# Patient Record
Sex: MALE | Race: WHITE | NOT HISPANIC OR LATINO | Employment: OTHER | RURAL
[De-identification: names, ages, dates, MRNs, and addresses within clinical notes are randomized per-mention and may not be internally consistent; named-entity substitution may affect disease eponyms.]

---

## 2017-02-24 ENCOUNTER — TELEPHONE (OUTPATIENT)
Dept: FAMILY MEDICINE CLINIC | Facility: CLINIC | Age: 57
End: 2017-02-24

## 2017-02-24 RX ORDER — AZITHROMYCIN 250 MG/1
TABLET, FILM COATED ORAL
Qty: 6 TABLET | Refills: 0 | Status: SHIPPED | OUTPATIENT
Start: 2017-02-24 | End: 2017-03-27

## 2017-02-24 NOTE — TELEPHONE ENCOUNTER
"Vm \"I have a very bad cough, nasal drainage, low grade temp for two weeks, my ears are also hurting. Do I need to be seen or get something called in?\"        2.5.13 onset  2.19.13  ro regular appointment  Z pack    "

## 2017-03-27 ENCOUNTER — HOSPITAL ENCOUNTER (OUTPATIENT)
Facility: HOSPITAL | Age: 57
Setting detail: HOSPITAL OUTPATIENT SURGERY
End: 2017-03-27
Attending: INTERNAL MEDICINE | Admitting: INTERNAL MEDICINE

## 2017-03-27 ENCOUNTER — OFFICE VISIT (OUTPATIENT)
Dept: GASTROENTEROLOGY | Facility: CLINIC | Age: 57
End: 2017-03-27

## 2017-03-27 VITALS
HEART RATE: 74 BPM | BODY MASS INDEX: 30.24 KG/M2 | HEIGHT: 71 IN | WEIGHT: 216 LBS | OXYGEN SATURATION: 99 % | SYSTOLIC BLOOD PRESSURE: 130 MMHG | DIASTOLIC BLOOD PRESSURE: 70 MMHG | TEMPERATURE: 96.4 F

## 2017-03-27 DIAGNOSIS — K63.5 COLON POLYPS: Primary | ICD-10-CM

## 2017-03-27 PROCEDURE — 99203 OFFICE O/P NEW LOW 30 MIN: CPT | Performed by: INTERNAL MEDICINE

## 2017-03-27 RX ORDER — POLYETHYLENE GLYCOL 3350, SODIUM CHLORIDE, SODIUM BICARBONATE, POTASSIUM CHLORIDE 420; 11.2; 5.72; 1.48 G/4L; G/4L; G/4L; G/4L
4000 POWDER, FOR SOLUTION ORAL SEE ADMIN INSTRUCTIONS
Qty: 4000 ML | Refills: 0 | Status: SHIPPED | OUTPATIENT
Start: 2017-03-27 | End: 2017-11-27

## 2017-03-27 NOTE — PROGRESS NOTES
[facility]    Chief Complaint   Patient presents with   • Colonoscopy       Subjective     HPI    Abdullahi Gomez is a 56 y.o. male who presents with a chief complaint of  colon polyps.  He last had a colonoscopy examination about 3 and half years ago.  Adenomatous polyps were noted.  He is doing well now.  Denies any changes bowel habits.  He has had no constipation or diarrhea.  No melena or bright red blood per rectum.    One thing he does have intermittently over the last couple months is intermittent epigastric discomfort.  He describes her attack of epigastric discomfort and he points to his xiphoid process.  It usually last only a couple minutes.  One time he was socially with a little nausea.  It is usually when he is sitting or when he is lying down.  It can be after he ate.  He had one 2 days ago and occurred while he was lying down he had eaten within the hour before.  He denies any dysphagia.  The pain does not radiate to his arm or jaw.  There is no emesis.  No fever chills sweats or diaphoresis.  He does not note any particular foods that brings it on.  He did have a little stressed lately with a grandchild in NICU and his father-in-law passed away.  He lost a little weight.  His diabetes under relatively control with his last hemoglobin A1c 7.5 which was down for him.  Never had any cardiac problems.  Denies a history of heartburn.    Past Medical History:   Diagnosis Date   • Diabetes mellitus    • Hypertension    • Premature ventricular beats        Past Surgical History:   Procedure Laterality Date   • APPENDECTOMY     • COLONOSCOPY  10/22/2013         Current Outpatient Prescriptions:   •  aspirin 81 MG EC tablet, Take 81 mg by mouth Daily., Disp: , Rfl:   •  BYDUREON 2 MG pen-injector, Inject 2 mg under the skin Every 7 (Seven) Days., Disp: , Rfl:   •  colesevelam (WELCHOL) 625 MG tablet, Take 3 tablets by mouth 2 (Two) Times a Day With Meals., Disp: 540 tablet, Rfl: 1  •   glyBURIDE (DIAbeta) 5 MG tablet, Take 5 mg by mouth 2 (Two) Times a Day With Meals., Disp: , Rfl:   •  JARDIANCE 10 MG tablet, TAKE 1 TABLET DAILY, Disp: 90 tablet, Rfl: 1  •  lisinopril (PRINIVIL,ZESTRIL) 10 MG tablet, Take 10 mg by mouth Daily., Disp: , Rfl:   •  metFORMIN (GLUCOPHAGE) 500 MG tablet, Take 1,000 mg by mouth 2 (Two) Times a Day With Meals., Disp: , Rfl:   •  tadalafil (CIALIS) 20 MG tablet, Take 20 mg by mouth Daily As Needed for erectile dysfunction., Disp: , Rfl:   •  polyethylene glycol-electrolytes (NULYTELY WITH FLAVOR PACKS) 420 G solution, Take 4,000 mL by mouth See Admin Instructions., Disp: 4000 mL, Rfl: 0  •  sildenafil (VIAGRA) 100 MG tablet, Take 1 tablet by mouth Daily As Needed for erectile dysfunction., Disp: 2 tablet, Rfl: 0    No Known Allergies    Social History     Social History   • Marital status:      Spouse name: Arlet   • Number of children: 3   • Years of education: 14     Occupational History   • retired       [employer]     Social History Main Topics   • Smoking status: Never Smoker   • Smokeless tobacco: Never Used   • Alcohol use No   • Drug use: No   • Sexual activity: Defer     Other Topics Concern   • Not on file     Social History Narrative       History reviewed. No pertinent family history.    Review of Systems   Constitutional: Negative for activity change, chills and fever.   HENT: Negative for drooling and nosebleeds.    Eyes: Negative for pain and discharge.   Respiratory: Negative for apnea and shortness of breath.    Cardiovascular: Negative for chest pain and palpitations.   Gastrointestinal: Positive for abdominal pain. Negative for abdominal distention, anal bleeding, constipation and diarrhea.        As mentioned in the HPI   Endocrine: Negative for cold intolerance and heat intolerance.   Genitourinary: Negative for hematuria and urgency.   Musculoskeletal: Negative for joint swelling and neck stiffness.   Skin: Negative for  rash and wound.   Allergic/Immunologic: Negative for food allergies and immunocompromised state.   Neurological: Negative for seizures and syncope.   Hematological: Negative for adenopathy. Does not bruise/bleed easily.   Psychiatric/Behavioral: Negative for behavioral problems and confusion.         Objective     Vitals:    03/27/17 1336   BP: 130/70   Pulse: 74   Temp: 96.4 °F (35.8 °C)   SpO2: 99%       Physical Exam   Constitutional: He appears well-developed and well-nourished.   Eyes: Conjunctivae and EOM are normal.   Cardiovascular: Normal rate, regular rhythm and normal heart sounds.    Pulmonary/Chest: Effort normal and breath sounds normal.   Abdominal: Soft. Bowel sounds are normal. He exhibits no distension and no mass. There is no tenderness. There is no rebound and no guarding.   Musculoskeletal: He exhibits no edema.   Skin: Skin is warm.         Assessment/Plan      Abdullahi was seen today for colonoscopy.    Diagnoses and all orders for this visit:    Colon polyps  -     Case request    Other orders  -     polyethylene glycol-electrolytes (NULYTELY WITH FLAVOR PACKS) 420 G solution; Take 4,000 mL by mouth See Admin Instructions.      1.  He does have a history of adenomatous polyps.  It is time for him to undergo a surveillance examination of the colon. The risk of the colonoscopy were discussed in detail.  We discussed the risk of perforation (one out of [id number]), bleeding (one out of 500), and the rare risks of ruptured spleen,  infection, adverse reaction to anesthesia, respiratory failure, cardiac failure including MI and adverse reaction to medications, etc.  We discussed consequences that could occur if a risk were to develop such as the need for hospitalization, blood transfusion, surgical intervention, medications, pain and disability and death.  He wishes to set up colonoscopy.    2.  Epigastric pain  is intermittent attacks of epigastric pain sounds like esophageal spasms.  There is no  indication for cardiac disease but did advise him on signs symptoms of cardiac disease to watch for.  I also suggested he has a prolonged episode to seek medical help immediately.  Otherwise I recommend reflux cautions I went over these in great detail with him. I discussed non pharmaceutical treatment of gerd.  This includes gradually losing weight to achieve ideal body wt., elevation of the head of bed by 4-6 inches, nothing to eat or drink 3 hours prior to lying down, avoiding tight clothing, stress reduction, tobacco cessation, reduction of alcohol intake, and dietary restrictions (avoiding caffeine, coffee, fatty foods, mints, chocolate, spicy foods and tomato based sauces as much as possible).  I also suggest that he get an over-the-counter Pepcid AC or Zantac 75 and take this before breakfast and supper for 1-2 weeks to see if this relieves his discomfort.  If it does then he can use it on a when necessary basis.  If it gets worse or develops any other symptoms or contact us.  We did discuss the option of an endoscopy if he continues to have troubles and he will let me know if such occurs.  Continue ongoing management by primary care provider and other specialists.     EMR Dragon/transcription disclaimer:  Much of this encounter note is electronic transcription/translation of spoken language to printed text.  The electronic translation of spoken language may be erroneous, or at times, nonsensical words or phrases may be inadvertently transcribed.  Although I have reviewed the note for such errors, some may still exist.    Rian Nobles MD  2:03 PM  03/27/17

## 2017-05-26 ENCOUNTER — OFFICE VISIT (OUTPATIENT)
Dept: FAMILY MEDICINE CLINIC | Facility: CLINIC | Age: 57
End: 2017-05-26

## 2017-05-26 VITALS
HEIGHT: 71 IN | OXYGEN SATURATION: 98 % | WEIGHT: 216 LBS | SYSTOLIC BLOOD PRESSURE: 128 MMHG | RESPIRATION RATE: 18 BRPM | DIASTOLIC BLOOD PRESSURE: 74 MMHG | HEART RATE: 88 BPM | BODY MASS INDEX: 30.24 KG/M2 | TEMPERATURE: 98.4 F

## 2017-05-26 DIAGNOSIS — I49.8 OTHER CARDIAC ARRHYTHMIA: ICD-10-CM

## 2017-05-26 DIAGNOSIS — G62.9 NEUROPATHY: Chronic | ICD-10-CM

## 2017-05-26 DIAGNOSIS — E11.9 CONTROLLED TYPE 2 DIABETES MELLITUS WITHOUT COMPLICATION, WITHOUT LONG-TERM CURRENT USE OF INSULIN (HCC): Chronic | ICD-10-CM

## 2017-05-26 DIAGNOSIS — E78.5 HYPERLIPIDEMIA, UNSPECIFIED HYPERLIPIDEMIA TYPE: Chronic | ICD-10-CM

## 2017-05-26 DIAGNOSIS — I10 ESSENTIAL HYPERTENSION: Primary | Chronic | ICD-10-CM

## 2017-05-26 LAB
BUN SERPL-MCNC: 17 MG/DL (ref 5–21)
BUN/CREAT SERPL: 15.2 (ref 7–25)
CALCIUM SERPL-MCNC: 10.5 MG/DL (ref 8.4–10.4)
CHLORIDE SERPL-SCNC: 96 MMOL/L (ref 98–110)
CO2 SERPL-SCNC: 29 MMOL/L (ref 24–31)
CREAT SERPL-MCNC: 1.12 MG/DL (ref 0.5–1.4)
GLUCOSE SERPL-MCNC: 97 MG/DL (ref 70–100)
HBA1C MFR BLD: 9.1 %
POTASSIUM SERPL-SCNC: 5 MMOL/L (ref 3.5–5.3)
SODIUM SERPL-SCNC: 138 MMOL/L (ref 135–145)

## 2017-05-26 PROCEDURE — 99213 OFFICE O/P EST LOW 20 MIN: CPT | Performed by: FAMILY MEDICINE

## 2017-05-30 ENCOUNTER — OUTSIDE FACILITY SERVICE (OUTPATIENT)
Dept: GASTROENTEROLOGY | Facility: CLINIC | Age: 57
End: 2017-05-30

## 2017-05-30 PROCEDURE — 45378 DIAGNOSTIC COLONOSCOPY: CPT | Performed by: INTERNAL MEDICINE

## 2017-06-22 DIAGNOSIS — E11.9 CONTROLLED TYPE 2 DIABETES MELLITUS WITHOUT COMPLICATION, WITHOUT LONG-TERM CURRENT USE OF INSULIN (HCC): Primary | Chronic | ICD-10-CM

## 2017-08-24 ENCOUNTER — RESULTS ENCOUNTER (OUTPATIENT)
Dept: FAMILY MEDICINE CLINIC | Facility: CLINIC | Age: 57
End: 2017-08-24

## 2017-08-24 DIAGNOSIS — E11.9 CONTROLLED TYPE 2 DIABETES MELLITUS WITHOUT COMPLICATION, WITHOUT LONG-TERM CURRENT USE OF INSULIN (HCC): Chronic | ICD-10-CM

## 2017-08-25 PROBLEM — Z01.89 LABORATORY TEST: Status: ACTIVE | Noted: 2017-08-25

## 2017-08-25 LAB — HBA1C MFR BLD: 7.6 %

## 2017-10-20 DIAGNOSIS — E11.9 CONTROLLED TYPE 2 DIABETES MELLITUS WITHOUT COMPLICATION, WITHOUT LONG-TERM CURRENT USE OF INSULIN (HCC): Primary | Chronic | ICD-10-CM

## 2017-10-20 RX ORDER — EMPAGLIFLOZIN 10 MG/1
TABLET, FILM COATED ORAL
Qty: 90 TABLET | Refills: 2 | Status: SHIPPED | OUTPATIENT
Start: 2017-10-20 | End: 2018-05-25 | Stop reason: CLARIF

## 2017-11-27 ENCOUNTER — OFFICE VISIT (OUTPATIENT)
Dept: FAMILY MEDICINE CLINIC | Facility: CLINIC | Age: 57
End: 2017-11-27

## 2017-11-27 VITALS
RESPIRATION RATE: 18 BRPM | DIASTOLIC BLOOD PRESSURE: 70 MMHG | OXYGEN SATURATION: 98 % | SYSTOLIC BLOOD PRESSURE: 120 MMHG | HEIGHT: 71 IN | HEART RATE: 98 BPM | BODY MASS INDEX: 30.1 KG/M2 | TEMPERATURE: 98.3 F | WEIGHT: 215 LBS

## 2017-11-27 DIAGNOSIS — E78.5 HYPERLIPIDEMIA, UNSPECIFIED HYPERLIPIDEMIA TYPE: Chronic | ICD-10-CM

## 2017-11-27 DIAGNOSIS — N52.9 ERECTILE DYSFUNCTION, UNSPECIFIED ERECTILE DYSFUNCTION TYPE: Chronic | ICD-10-CM

## 2017-11-27 DIAGNOSIS — E11.9 CONTROLLED TYPE 2 DIABETES MELLITUS WITHOUT COMPLICATION, WITHOUT LONG-TERM CURRENT USE OF INSULIN (HCC): Chronic | ICD-10-CM

## 2017-11-27 DIAGNOSIS — G62.9 NEUROPATHY: Chronic | ICD-10-CM

## 2017-11-27 DIAGNOSIS — F95.9 TIC: ICD-10-CM

## 2017-11-27 DIAGNOSIS — J32.9 SINUSITIS, UNSPECIFIED CHRONICITY, UNSPECIFIED LOCATION: ICD-10-CM

## 2017-11-27 DIAGNOSIS — I10 ESSENTIAL HYPERTENSION: Primary | Chronic | ICD-10-CM

## 2017-11-27 PROCEDURE — 99213 OFFICE O/P EST LOW 20 MIN: CPT | Performed by: FAMILY MEDICINE

## 2017-11-27 RX ORDER — FLUTICASONE PROPIONATE 50 MCG
2 SPRAY, SUSPENSION (ML) NASAL DAILY
Qty: 1 BOTTLE | Refills: 0
Start: 2017-11-27 | End: 2018-07-13

## 2017-11-27 NOTE — PROGRESS NOTES
Subjective   Abdullahi Gomez is a 57 y.o. male presenting with chief complaint of:   Chief Complaint   Patient presents with   • Hypertension   • Hyperlipidemia   • Diabetes   • Erectile Dysfunction       History of Present Illness :  unaccompanied.  Here for primarily a/an Chronic issue today.   Has has  multiple chronic problems to consider, is here for an interval appointment.  One chronic problem is HTN.     The HTN has been present for years/it is chronic.  The HTN is assumed essential/without testing needed to look for other.  The HTN is controlled manifest by todays blood pressure and same home monitoring.  Associated illness below.   Other chronic problem/s to consider:   DM2: This has been present for years/over a year.  It is chronic.  It is generally controlled.  Home accuchecks run fasting 100, random 140.   No hypoglycemia manifest as syncope/near syncope or diaphoretic/sweating episodes.  A1c below if available.  Diet loose with less sweets. .   Hyperlipidemia: This has been present for years/over a year.  It is chronic.   It is generally controlled.   .  Labs are needed periodic to monitor condition/safetly.   Rx therapy Welchol.  Erectile dysfunction/sexual dysfunction:  This has been present for   This is chronic.  This has not been tested.  It is manifest as weaker/at times erections making intercoarse difficult/incomplete.    Neuropathy:  Has had for years/chronic.  On/off LE numbness.  No obvious worse.      Has an/another acute issue today: sinus congestion 2w; without fever/dental issues.    The following portions of the patient's history were reviewed and updated as appropriate: allergies, current medications, past family history, past medical history, past social history, past surgical history and problem list.  Records acquired and reviewed; TCC migrated.      Current Outpatient Prescriptions:   •  aspirin 81 MG EC tablet, Take 81 mg by mouth Daily., Disp: , Rfl:   •  colesevelam (WELCHOL)  625 MG tablet, Take 3 tablets by mouth 2 (Two) Times a Day With Meals., Disp: 540 tablet, Rfl: 1  •  Exenatide ER (BYDUREON) 2 MG pen-injector, Inject 2 mg under the skin Every 7 (Seven) Days., Disp: 12 each, Rfl: 3  •  glyBURIDE (DIAbeta) 5 MG tablet, Take 5 mg by mouth 2 (Two) Times a Day With Meals., Disp: , Rfl:   •  JARDIANCE 10 MG tablet, TAKE 1 TABLET DAILY, Disp: 90 tablet, Rfl: 2  •  lisinopril (PRINIVIL,ZESTRIL) 10 MG tablet, Take 10 mg by mouth Daily., Disp: , Rfl:   •  metFORMIN (GLUCOPHAGE) 500 MG tablet, Take 1,000 mg by mouth 2 (Two) Times a Day With Meals., Disp: , Rfl:   •  tadalafil (CIALIS) 20 MG tablet, Take 20 mg by mouth Daily As Needed for erectile dysfunction., Disp: , Rfl:     No Known Allergies    Review of Systems  GENERAL:  Active/slower with limits, speed, stamina for age. Sleep is ok. No fever now.  ENDO:  No syncope, near or diaphoretic sweaty spells.  BS Ok: no download noted.  HEENT: No head injury or headache,  No vision change, No significant hearing loss.  Ears without pain/drainage.  No sore throat.  No usual significant nasal/sinus congestion/drainage; worse couple weeks.  No epistaxis.  CHEST: No chest wall tenderness or mass. No significant cough, without wheeze.  No SOB; no hemoptysis.  CV: No chest pain, palpitations, ankle edema.  GI: No heartburn, dysphagia.  No abdominal pain, diarrhea, constipation.  No rectal bleeding, or melena.    Colon-nl/[facility]/6.12.17/10y    :  Voids without dysuria, or incontinence to completion.  ORTHO: No painful/swollen joints but various on /off sore.  No  sore neck or back.  No acute neck or back pain without recent injury.   NEURO: No dizziness, weakness of extremities.  No change occ numbness/paresthesias.   Two weeks tics of L cheek.   PSYCH: No memory loss.  Mood good; not anxious, depressed but/and not suicidal.  Tries to tolerate stress .     Results for orders placed or performed in visit on 08/24/17   Hemoglobin A1c   Result  "Value Ref Range    Hemoglobin A1C 7.60 %       Lab Results   Component Value Date    HGBA1C 7.60 08/25/2017    HGBA1C 9.10 05/26/2017       Lab Results   Component Value Date     05/26/2017     12/02/2016    K 5.0 05/26/2017    K 4.7 12/02/2016    CL 96 (L) 05/26/2017     12/02/2016    CO2 29.0 05/26/2017    CO2 26.0 12/02/2016    BUN 17 05/26/2017    BUN 14 12/02/2016    CREATININE 1.12 05/26/2017    CREATININE 1.16 12/02/2016    CALCIUM 10.5 (H) 05/26/2017    CALCIUM 9.8 12/02/2016       Lab Results   Component Value Date    PSA 0.404 12/02/2016        Lab Results:  CBC:    Lab Results - Last 18 Months  Lab Units 12/02/16  0821   WBC 10*3/mm3 6.75   HEMATOCRIT % 47.0     CMP:    Lab Results - Last 18 Months  Lab Units 05/26/17  0954 12/02/16  0821   SODIUM mmol/L 138 140   CHLORIDE mmol/L 96* 102   TOTAL CO2, ARTERIAL mmol/L 29.0 26.0   BUN mg/dL 17 14   CREATININE mg/dL 1.12 1.16   EGFR IF NONAFRICN AM mL/min/1.73 68 66   EGFR IF AFRICN AM mL/min/1.73 83 80   CALCIUM mg/dL 10.5* 9.8     THYROID:No results for input(s): TSH, T3FREE, FREET4 in the last 01166 hours.    Invalid input(s): T3, T4  A1C:    Lab Results - Last 18 Months  Lab Units 08/25/17  0739 05/26/17  0954   HEMOGLOBIN A1C % 7.60 9.10       Objective   /70  Pulse 98  Temp 98.3 °F (36.8 °C) (Oral)   Resp 18  Ht 71\" (180.3 cm)  Wt 215 lb (97.5 kg)  SpO2 98%  BMI 29.99 kg/m2    Physical Exam  GENERAL:  Well nourished/developed in no acute distress. Obese   SKIN: Turgor excellent, without wound, rash, lesion.  HEENT: Normal cephalic without trauma.  Pupils equal round reactive to light. Extraocular motions full without nystagmus.   External canals nonobstructive nontender without reddness. Tymphatic membranes vale with ottoniel structures intact.   Oral cavity without growths, exudates, and moist.  Posterior pharynx without mass, obstruction, redness.  No thyromegaly, mass, tenderness, lymphadenopathy and supple.  CV: Regular " rhythm.  No murmur, gallop,  edema. Posterior pulses intact.  No carotid bruits.  CHEST: No chest wall tenderness or mass.   LUNGS: Symmetric motion with clear to auscultation.   ABD: Soft, nontender without mass.   ORTHO: Symmetric extremities without swelling/point tenderness.  Full gross range of motion.  NEURO: CN 2-12 grossly intact.  Symmetric facies. 1/4 x bicep knee equal reflexes.  UE/LE   3-4/5 strength throughout.  Nonfocal use extremities. Speech clear.  Reduced light touch with monofilament, vibratory sensation with tuning fork; equal toes/distal feet.    PSYCH: Oriented x 3.  Pleasant calm, well kept.  Purposeful/directed conservation with intact short/long gross memory.     Assessment/Plan     1. Essential hypertension    2. Hyperlipidemia, unspecified hyperlipidemia type    3. Controlled type 2 diabetes mellitus without complication, without long-term current use of insulin    4. Neuropathy    5. Erectile dysfunction, unspecified erectile dysfunction type    6. Sinusitis, unspecified chronicity, unspecified location    7. Tic        Rx: reviewed/changes:  flonase OTC for a few days first    LAB: reviewed/orders:   6m BMP, A1c  12m CBC, CMP, A1c, LIPID, TSH, PSAs    Discussions:   Wrap up    Patient Instructions   Regular cardio exercise something everyone should consider and try to do; even if health limitations (ie find that exercise UE/LE/cardio that they can tolerate). (as we discussed)  Normal weight a goal for everyone (as we discussed)  Healthy diet helpful for weight management, illness prevention (as we discussed)  If over 50-screening exams include men PSA/rectal exam, women mammograms, and  everyone colonoscopy screening for colon cancer.          Follow up: Return for lab couple months; , lab during/or just before next apt;, Dr Gandhi-, 6 m;.  No future appointments.

## 2017-11-27 NOTE — PATIENT INSTRUCTIONS
Regular cardio exercise something everyone should consider and try to do; even if health limitations (ie find that exercise UE/LE/cardio that they can tolerate). (as we discussed)  Normal weight a goal for everyone (as we discussed)  Healthy diet helpful for weight management, illness prevention (as we discussed)  If over 50-screening exams include men PSA/rectal exam, women mammograms, and  everyone colonoscopy screening for colon cancer.

## 2018-01-23 DIAGNOSIS — N52.9 ERECTILE DYSFUNCTION, UNSPECIFIED ERECTILE DYSFUNCTION TYPE: Chronic | ICD-10-CM

## 2018-01-23 DIAGNOSIS — Z00.00 WELLNESS EXAMINATION: ICD-10-CM

## 2018-01-23 DIAGNOSIS — I10 ESSENTIAL HYPERTENSION: Primary | Chronic | ICD-10-CM

## 2018-01-23 DIAGNOSIS — E78.5 HYPERLIPIDEMIA, UNSPECIFIED HYPERLIPIDEMIA TYPE: Chronic | ICD-10-CM

## 2018-01-23 DIAGNOSIS — E66.9 CLASS 1 OBESITY WITH BODY MASS INDEX (BMI) OF 30.0 TO 30.9 IN ADULT, UNSPECIFIED OBESITY TYPE, UNSPECIFIED WHETHER SERIOUS COMORBIDITY PRESENT: Chronic | ICD-10-CM

## 2018-01-23 DIAGNOSIS — E11.9 CONTROLLED TYPE 2 DIABETES MELLITUS WITHOUT COMPLICATION, WITHOUT LONG-TERM CURRENT USE OF INSULIN (HCC): Chronic | ICD-10-CM

## 2018-01-23 DIAGNOSIS — G62.9 NEUROPATHY: Chronic | ICD-10-CM

## 2018-01-24 ENCOUNTER — RESULTS ENCOUNTER (OUTPATIENT)
Dept: FAMILY MEDICINE CLINIC | Facility: CLINIC | Age: 58
End: 2018-01-24

## 2018-01-24 DIAGNOSIS — E11.9 CONTROLLED TYPE 2 DIABETES MELLITUS WITHOUT COMPLICATION, WITHOUT LONG-TERM CURRENT USE OF INSULIN (HCC): Chronic | ICD-10-CM

## 2018-01-24 DIAGNOSIS — E78.5 HYPERLIPIDEMIA, UNSPECIFIED HYPERLIPIDEMIA TYPE: Chronic | ICD-10-CM

## 2018-01-24 DIAGNOSIS — N52.9 ERECTILE DYSFUNCTION, UNSPECIFIED ERECTILE DYSFUNCTION TYPE: Chronic | ICD-10-CM

## 2018-01-24 DIAGNOSIS — E66.9 CLASS 1 OBESITY WITH BODY MASS INDEX (BMI) OF 30.0 TO 30.9 IN ADULT, UNSPECIFIED OBESITY TYPE, UNSPECIFIED WHETHER SERIOUS COMORBIDITY PRESENT: Chronic | ICD-10-CM

## 2018-01-24 DIAGNOSIS — Z00.00 WELLNESS EXAMINATION: ICD-10-CM

## 2018-01-24 DIAGNOSIS — G62.9 NEUROPATHY: Chronic | ICD-10-CM

## 2018-01-24 DIAGNOSIS — I10 ESSENTIAL HYPERTENSION: Chronic | ICD-10-CM

## 2018-01-30 DIAGNOSIS — E11.9 CONTROLLED TYPE 2 DIABETES MELLITUS WITHOUT COMPLICATION, WITHOUT LONG-TERM CURRENT USE OF INSULIN (HCC): Primary | Chronic | ICD-10-CM

## 2018-01-30 DIAGNOSIS — I10 ESSENTIAL HYPERTENSION: Chronic | ICD-10-CM

## 2018-01-30 RX ORDER — LISINOPRIL 10 MG/1
10 TABLET ORAL DAILY
Qty: 90 TABLET | Refills: 0 | Status: SHIPPED | OUTPATIENT
Start: 2018-01-30 | End: 2018-09-19 | Stop reason: SDUPTHER

## 2018-02-28 ENCOUNTER — TELEPHONE (OUTPATIENT)
Dept: FAMILY MEDICINE CLINIC | Facility: CLINIC | Age: 58
End: 2018-02-28

## 2018-02-28 ENCOUNTER — OFFICE VISIT (OUTPATIENT)
Dept: FAMILY MEDICINE CLINIC | Facility: CLINIC | Age: 58
End: 2018-02-28

## 2018-02-28 VITALS
HEART RATE: 118 BPM | SYSTOLIC BLOOD PRESSURE: 128 MMHG | HEIGHT: 71 IN | BODY MASS INDEX: 29.68 KG/M2 | WEIGHT: 212 LBS | OXYGEN SATURATION: 97 % | TEMPERATURE: 99.9 F | RESPIRATION RATE: 16 BRPM | DIASTOLIC BLOOD PRESSURE: 72 MMHG

## 2018-02-28 DIAGNOSIS — L03.314 CELLULITIS OF GROIN: Primary | ICD-10-CM

## 2018-02-28 PROCEDURE — 10061 I&D ABSCESS COMP/MULTIPLE: CPT | Performed by: FAMILY MEDICINE

## 2018-02-28 PROCEDURE — 99213 OFFICE O/P EST LOW 20 MIN: CPT | Performed by: FAMILY MEDICINE

## 2018-02-28 RX ORDER — LEVOFLOXACIN 750 MG/1
750 TABLET ORAL DAILY
Qty: 7 TABLET | Refills: 0 | Status: SHIPPED | OUTPATIENT
Start: 2018-02-28 | End: 2018-05-25

## 2018-02-28 RX ORDER — HYDROCODONE BITARTRATE AND ACETAMINOPHEN 5; 325 MG/1; MG/1
1 TABLET ORAL EVERY 6 HOURS PRN
Qty: 12 TABLET | Refills: 0 | Status: SHIPPED | OUTPATIENT
Start: 2018-02-28 | End: 2018-07-13

## 2018-03-05 LAB
BACTERIA SPEC AEROBE CULT: ABNORMAL
BACTERIA SPEC ANAEROBE CULT: ABNORMAL
BACTERIA SPEC CULT: ABNORMAL
BACTERIA SPEC CULT: ABNORMAL
Lab: NORMAL

## 2018-03-08 ENCOUNTER — TELEPHONE (OUTPATIENT)
Dept: FAMILY MEDICINE CLINIC | Facility: CLINIC | Age: 58
End: 2018-03-08

## 2018-03-08 RX ORDER — METRONIDAZOLE 250 MG/1
250 TABLET ORAL 3 TIMES DAILY
Qty: 21 TABLET | Refills: 0 | Status: SHIPPED | OUTPATIENT
Start: 2018-03-08 | End: 2018-05-25

## 2018-03-08 RX ORDER — DOXYCYCLINE HYCLATE 100 MG/1
TABLET, DELAYED RELEASE ORAL
Qty: 21 TABLET | Refills: 0 | Status: SHIPPED | OUTPATIENT
Start: 2018-03-08 | End: 2018-03-08 | Stop reason: SDUPTHER

## 2018-03-08 RX ORDER — METRONIDAZOLE 250 MG/1
250 TABLET ORAL 3 TIMES DAILY
Qty: 21 TABLET | Refills: 0 | Status: SHIPPED | OUTPATIENT
Start: 2018-03-08 | End: 2018-03-08 | Stop reason: SDUPTHER

## 2018-03-08 RX ORDER — DOXYCYCLINE HYCLATE 100 MG/1
TABLET, DELAYED RELEASE ORAL
Qty: 21 TABLET | Refills: 0 | Status: SHIPPED | OUTPATIENT
Start: 2018-03-08 | End: 2018-05-25

## 2018-05-25 ENCOUNTER — TELEPHONE (OUTPATIENT)
Dept: FAMILY MEDICINE CLINIC | Facility: CLINIC | Age: 58
End: 2018-05-25

## 2018-05-25 NOTE — TELEPHONE ENCOUNTER
Current Outpatient Prescriptions:   •  aspirin 81 MG EC tablet, Take 81 mg by mouth Daily., Disp: , Rfl:   •  colesevelam (WELCHOL) 625 MG tablet, Take 3 tablets by mouth 2 (Two) Times a Day With Meals., Disp: 540 tablet, Rfl: 1  •  Exenatide ER (BYDUREON) 2 MG pen-injector, Inject 2 mg under the skin Every 7 (Seven) Days., Disp: 12 each, Rfl: 3  •  fluticasone (FLONASE) 50 MCG/ACT nasal spray, 2 sprays into each nostril Daily., Disp: 1 bottle, Rfl: 0  •  glyBURIDE (DIAbeta) 5 MG tablet, Take 5 mg by mouth 2 (Two) Times a Day With Meals., Disp: , Rfl:   •  HYDROcodone-acetaminophen (NORCO) 5-325 MG per tablet, Take 1 tablet by mouth Every 6 (Six) Hours As Needed for Moderate Pain ., Disp: 12 tablet, Rfl: 0  •  JARDIANCE 10 MG tablet, TAKE 1 TABLET DAILY, Disp: 90 tablet, Rfl: 2  •  lisinopril (PRINIVIL,ZESTRIL) 10 MG tablet, Take 1 tablet by mouth Daily., Disp: 90 tablet, Rfl: 0  •  metFORMIN (GLUCOPHAGE) 500 MG tablet, Take 2 tablets by mouth 2 (Two) Times a Day With Meals., Disp: 360 tablet, Rfl: 0  •  tadalafil (CIALIS) 20 MG tablet, Take 20 mg by mouth Daily As Needed for erectile dysfunction., Disp: , Rfl:     Invokana 100 mg

## 2018-05-25 NOTE — TELEPHONE ENCOUNTER
Pt called and states insurance will not pay for Jardiance any longer but they will cover Invokana and pt would like a new Rx sent [phone]

## 2018-06-21 RX ORDER — COLESEVELAM 180 1/1
1875 TABLET ORAL 2 TIMES DAILY WITH MEALS
Qty: 540 TABLET | Refills: 1 | Status: SHIPPED | OUTPATIENT
Start: 2018-06-21 | End: 2019-02-27 | Stop reason: SDUPTHER

## 2018-07-09 DIAGNOSIS — E78.5 HYPERLIPIDEMIA, UNSPECIFIED HYPERLIPIDEMIA TYPE: Chronic | ICD-10-CM

## 2018-07-09 DIAGNOSIS — G62.9 NEUROPATHY: Chronic | ICD-10-CM

## 2018-07-09 DIAGNOSIS — E11.9 CONTROLLED TYPE 2 DIABETES MELLITUS WITHOUT COMPLICATION, WITHOUT LONG-TERM CURRENT USE OF INSULIN (HCC): Chronic | ICD-10-CM

## 2018-07-09 DIAGNOSIS — Z00.00 WELLNESS EXAMINATION: Primary | ICD-10-CM

## 2018-07-09 DIAGNOSIS — I10 ESSENTIAL HYPERTENSION: Chronic | ICD-10-CM

## 2018-07-09 DIAGNOSIS — N52.9 ERECTILE DYSFUNCTION, UNSPECIFIED ERECTILE DYSFUNCTION TYPE: Chronic | ICD-10-CM

## 2018-07-10 ENCOUNTER — RESULTS ENCOUNTER (OUTPATIENT)
Dept: FAMILY MEDICINE CLINIC | Facility: CLINIC | Age: 58
End: 2018-07-10

## 2018-07-10 DIAGNOSIS — Z00.00 WELLNESS EXAMINATION: ICD-10-CM

## 2018-07-10 DIAGNOSIS — N52.9 ERECTILE DYSFUNCTION, UNSPECIFIED ERECTILE DYSFUNCTION TYPE: Chronic | ICD-10-CM

## 2018-07-10 DIAGNOSIS — E78.5 HYPERLIPIDEMIA, UNSPECIFIED HYPERLIPIDEMIA TYPE: Chronic | ICD-10-CM

## 2018-07-10 DIAGNOSIS — E11.9 CONTROLLED TYPE 2 DIABETES MELLITUS WITHOUT COMPLICATION, WITHOUT LONG-TERM CURRENT USE OF INSULIN (HCC): Chronic | ICD-10-CM

## 2018-07-10 DIAGNOSIS — I10 ESSENTIAL HYPERTENSION: Chronic | ICD-10-CM

## 2018-07-10 DIAGNOSIS — G62.9 NEUROPATHY: Chronic | ICD-10-CM

## 2018-07-12 PROBLEM — E83.52 HYPERCALCEMIA: Status: ACTIVE | Noted: 2018-07-12

## 2018-07-12 LAB
ALBUMIN SERPL-MCNC: 4.3 G/DL (ref 3.5–5)
ALBUMIN/GLOB SERPL: 1.4 G/DL (ref 1.1–2.5)
ALP SERPL-CCNC: 85 U/L (ref 24–120)
ALT SERPL-CCNC: 37 U/L (ref 0–54)
AST SERPL-CCNC: 34 U/L (ref 7–45)
BASOPHILS # BLD AUTO: 0.02 10*3/MM3 (ref 0–0.2)
BASOPHILS NFR BLD AUTO: 0.3 % (ref 0–2)
BILIRUB SERPL-MCNC: 0.7 MG/DL (ref 0.1–1)
BUN SERPL-MCNC: 11 MG/DL (ref 5–21)
BUN/CREAT SERPL: 11 (ref 7–25)
CALCIUM SERPL-MCNC: 10.9 MG/DL (ref 8.4–10.4)
CHLORIDE SERPL-SCNC: 97 MMOL/L (ref 98–110)
CHOLEST SERPL-MCNC: 141 MG/DL (ref 130–200)
CO2 SERPL-SCNC: 28 MMOL/L (ref 24–31)
CREAT SERPL-MCNC: 1 MG/DL (ref 0.5–1.4)
EOSINOPHIL # BLD AUTO: 0.17 10*3/MM3 (ref 0–0.7)
EOSINOPHIL NFR BLD AUTO: 2.5 % (ref 0–4)
ERYTHROCYTE [DISTWIDTH] IN BLOOD BY AUTOMATED COUNT: 13.2 % (ref 12–15)
GLOBULIN SER CALC-MCNC: 3 GM/DL
GLUCOSE SERPL-MCNC: 102 MG/DL (ref 70–100)
HBA1C MFR BLD: 9.7 %
HCT VFR BLD AUTO: 49.2 % (ref 40–52)
HCV AB S/CO SERPL IA: 0.2 S/CO RATIO (ref 0–0.9)
HDLC SERPL-MCNC: 31 MG/DL
HGB BLD-MCNC: 16.5 G/DL (ref 14–18)
IMM GRANULOCYTES # BLD: 0.03 10*3/MM3 (ref 0–0.03)
IMM GRANULOCYTES NFR BLD: 0.4 % (ref 0–5)
LDLC SERPL CALC-MCNC: 88 MG/DL (ref 0–99)
LYMPHOCYTES # BLD AUTO: 1.72 10*3/MM3 (ref 0.72–4.86)
LYMPHOCYTES NFR BLD AUTO: 25.4 % (ref 15–45)
MCH RBC QN AUTO: 30.3 PG (ref 28–32)
MCHC RBC AUTO-ENTMCNC: 33.5 G/DL (ref 33–36)
MCV RBC AUTO: 90.3 FL (ref 82–95)
MICROALBUMIN UR-MCNC: 7.7 UG/ML
MONOCYTES # BLD AUTO: 0.58 10*3/MM3 (ref 0.19–1.3)
MONOCYTES NFR BLD AUTO: 8.6 % (ref 4–12)
NEUTROPHILS # BLD AUTO: 4.25 10*3/MM3 (ref 1.87–8.4)
NEUTROPHILS NFR BLD AUTO: 62.8 % (ref 39–78)
NRBC BLD AUTO-RTO: 0 /100 WBC (ref 0–0)
PLATELET # BLD AUTO: 183 10*3/MM3 (ref 130–400)
POTASSIUM SERPL-SCNC: 4.4 MMOL/L (ref 3.5–5.3)
PROT SERPL-MCNC: 7.3 G/DL (ref 6.3–8.7)
PSA SERPL-MCNC: 0.52 NG/ML (ref 0–4)
RBC # BLD AUTO: 5.45 10*6/MM3 (ref 4.8–5.9)
SODIUM SERPL-SCNC: 138 MMOL/L (ref 135–145)
TRIGL SERPL-MCNC: 108 MG/DL (ref 0–149)
TSH SERPL DL<=0.005 MIU/L-ACNC: 1.68 MIU/ML (ref 0.47–4.68)
VLDLC SERPL CALC-MCNC: 21.6 MG/DL
WBC # BLD AUTO: 6.77 10*3/MM3 (ref 4.8–10.8)

## 2018-07-13 ENCOUNTER — OFFICE VISIT (OUTPATIENT)
Dept: FAMILY MEDICINE CLINIC | Facility: CLINIC | Age: 58
End: 2018-07-13

## 2018-07-13 VITALS
OXYGEN SATURATION: 98 % | SYSTOLIC BLOOD PRESSURE: 100 MMHG | HEIGHT: 71 IN | TEMPERATURE: 98.4 F | HEART RATE: 48 BPM | BODY MASS INDEX: 29.96 KG/M2 | WEIGHT: 214 LBS | DIASTOLIC BLOOD PRESSURE: 66 MMHG | RESPIRATION RATE: 18 BRPM

## 2018-07-13 DIAGNOSIS — E78.5 HYPERLIPIDEMIA, UNSPECIFIED HYPERLIPIDEMIA TYPE: Chronic | ICD-10-CM

## 2018-07-13 DIAGNOSIS — IMO0001 UNCONTROLLED TYPE 2 DIABETES MELLITUS WITHOUT COMPLICATION, WITHOUT LONG-TERM CURRENT USE OF INSULIN: ICD-10-CM

## 2018-07-13 DIAGNOSIS — I49.8 OTHER CARDIAC ARRHYTHMIA: ICD-10-CM

## 2018-07-13 DIAGNOSIS — I10 HYPERTENSION, UNSPECIFIED TYPE: Primary | Chronic | ICD-10-CM

## 2018-07-13 DIAGNOSIS — G62.9 NEUROPATHY: Chronic | ICD-10-CM

## 2018-07-13 DIAGNOSIS — E83.52 HYPERCALCEMIA: ICD-10-CM

## 2018-07-13 DIAGNOSIS — R00.1 BRADYCARDIA: ICD-10-CM

## 2018-07-13 PROBLEM — IMO0002 DIABETES TYPE 2, UNCONTROLLED: Status: ACTIVE | Noted: 2018-07-13

## 2018-07-13 PROCEDURE — 99214 OFFICE O/P EST MOD 30 MIN: CPT | Performed by: FAMILY MEDICINE

## 2018-07-13 NOTE — PROGRESS NOTES
Subjective   Abdullahi Gomez is a 57 y.o. male presenting with chief complaint of:   Chief Complaint   Patient presents with   • Hypertension     Patient is here today for a 6 month follow-up.   • Hyperlipidemia   • Diabetes       History of Present Illness :  Alone.   Has multiple chronic problems to consider that might have a bearing on today's issues;  an interval appointment.       Chronic/acute problems to review today:   1. Hypertension, unspecified type: chronic/stable with visit bp here and rare home   2. Controlled type 2 diabetes mellitus without complication, without long-term current use of insulin (CMS/[facility]): chronic/worse with recent A1c higher.  Not tight on diet or exercise.  No hypoglycemia.  Last eye exam some early retinopathy R.  LE numbness before.    3. Hyperlipidemia, unspecified hyperlipidemia type: chronic/welchol treated/improved   4. Neuropathy: chronic/slowly worse but tolerated.    5. Other cardiac arrhythmia: new/ashkan today without symptoms   6. Hypercalcemia-#: chronic/variable without dx yet.      Has an/another acute issue today: none.    The following portions of the patient's history were reviewed and updated as appropriate: allergies, current medications, past family history, past medical history, past social history, past surgical history and problem list.  Records acquired and reviewed; TCC migrated.      Current Outpatient Prescriptions:   •  aspirin 81 MG EC tablet, Take 81 mg by mouth Daily., Disp: , Rfl:   •  colesevelam (WELCHOL) 625 MG tablet, Take 3 tablets by mouth 2 (Two) Times a Day With Meals., Disp: 540 tablet, Rfl: 1  •  Exenatide ER (BYDUREON) 2 MG pen-injector, Inject 2 mg under the skin Every 7 (Seven) Days., Disp: 12 each, Rfl: 3  •  glyBURIDE (DIAbeta) 5 MG tablet, Take 5 mg by mouth 2 (Two) Times a Day With Meals., Disp: , Rfl:   •  lisinopril (PRINIVIL,ZESTRIL) 10 MG tablet, Take 1 tablet by mouth Daily., Disp: 90 tablet, Rfl: 0  •  metFORMIN (GLUCOPHAGE) 500  MG tablet, Take 2 tablets by mouth 2 (Two) Times a Day With Meals., Disp: 360 tablet, Rfl: 0  •  tadalafil (CIALIS) 20 MG tablet, Take 20 mg by mouth Daily As Needed for erectile dysfunction., Disp: , Rfl:   •  Canagliflozin (INVOKANA) 300 MG tablet, Take 300 mg by mouth Daily., Disp: 30 tablet, Rfl: 1    No problems with medications.  Refills if needed done    No Known Allergies    Review of Systems  GENERAL:  Active/slower with limits, speed, stamina for age. Sleep is ok. No fever now.  ENDO:  No syncope, near or diaphoretic sweaty spells.  BS Ok: no download noted.  HEENT: No head injury or headache,  No vision change, No significant hearing loss.  Ears without pain/drainage.  No sore throat.  No usual significant nasal/sinus congestion/drainage; worse couple weeks.  No epistaxis.  CHEST: No chest wall tenderness or mass. No significant cough, without wheeze.  No SOB; no hemoptysis.  CV: No chest pain, palpitations, ankle edema.  GI: No heartburn, dysphagia.  No abdominal pain, diarrhea, constipation.  No rectal bleeding, or melena.    :  Voids without dysuria, or incontinence to completion. AUA 3/1-tolerated  ORTHO: No painful/swollen joints but various on /off sore.  No  sore neck or back.  No acute neck or back pain without recent injury.   NEURO: No dizziness, weakness of extremities.  No change occ numbness/paresthesias.   Two weeks tics of L cheek.   PSYCH: No memory loss.  Mood good; not anxious, depressed     Screening:  Mammogram: NA  Bone density: NA  Low dose CT chest: NA  GI: Colon-nl/[facility]/6.12.17/10y  Prostate due    Results for orders placed or performed in visit on 07/10/18   MicroAlbumin, Urine, Random - Urine, Clean Catch   Result Value Ref Range    Microalbumin, Urine 7.7 Not Estab. ug/mL   Hepatitis C antibody   Result Value Ref Range    Hep C Virus Ab 0.2 0.0 - 0.9 s/co ratio   Hemoglobin A1c   Result Value Ref Range    Hemoglobin A1C 9.70 %   Comprehensive metabolic panel   Result  Value Ref Range    Glucose 102 (H) 70 - 100 mg/dL    BUN 11 5 - 21 mg/dL    Creatinine 1.00 0.50 - 1.40 mg/dL    eGFR Non African Am 78 >60 mL/min/1.73    eGFR African Am 94 >60 mL/min/1.73    BUN/Creatinine Ratio 11.0 7.0 - 25.0    Sodium 138 135 - 145 mmol/L    Potassium 4.4 3.5 - 5.3 mmol/L    Chloride 97 (L) 98 - 110 mmol/L    Total CO2 28.0 24.0 - 31.0 mmol/L    Calcium 10.9 (H) 8.4 - 10.4 mg/dL    Total Protein 7.3 6.3 - 8.7 g/dL    Albumin 4.30 3.50 - 5.00 g/dL    Globulin 3.0 gm/dL    A/G Ratio 1.4 1.1 - 2.5 g/dL    Total Bilirubin 0.7 0.1 - 1.0 mg/dL    Alkaline Phosphatase 85 24 - 120 U/L    AST (SGOT) 34 7 - 45 U/L    ALT (SGPT) 37 0 - 54 U/L   Lipid panel   Result Value Ref Range    Total Cholesterol 141 130 - 200 mg/dL    Triglycerides 108 0 - 149 mg/dL    HDL Cholesterol 31 (L) >=40 mg/dL    VLDL Cholesterol 21.6 mg/dL    LDL Cholesterol  88 0 - 99 mg/dL   TSH   Result Value Ref Range    TSH 1.680 0.470 - 4.680 mIU/mL   PSA   Result Value Ref Range    PSA 0.518 0.000 - 4.000 ng/mL   CBC and Differential   Result Value Ref Range    WBC 6.77 4.80 - 10.80 10*3/mm3    RBC 5.45 4.80 - 5.90 10*6/mm3    Hemoglobin 16.5 14.0 - 18.0 g/dL    Hematocrit 49.2 40.0 - 52.0 %    MCV 90.3 82.0 - 95.0 fL    MCH 30.3 28.0 - 32.0 pg    MCHC 33.5 33.0 - 36.0 g/dL    RDW 13.2 12.0 - 15.0 %    Platelets 183 130 - 400 10*3/mm3    Neutrophil Rel % 62.8 39.0 - 78.0 %    Lymphocyte Rel % 25.4 15.0 - 45.0 %    Monocyte Rel % 8.6 4.0 - 12.0 %    Eosinophil Rel % 2.5 0.0 - 4.0 %    Basophil Rel % 0.3 0.0 - 2.0 %    Neutrophils Absolute 4.25 1.87 - 8.40 10*3/mm3    Lymphocytes Absolute 1.72 0.72 - 4.86 10*3/mm3    Monocytes Absolute 0.58 0.19 - 1.30 10*3/mm3    Eosinophils Absolute 0.17 0.00 - 0.70 10*3/mm3    Basophils Absolute 0.02 0.00 - 0.20 10*3/mm3    Immature Granulocyte Rel % 0.4 0.0 - 5.0 %    Immature Grans Absolute 0.03 0.00 - 0.03 10*3/mm3    nRBC 0.0 0.0 - 0.0 /100 WBC       Lab Results   Component Value Date    PSA  "0.518 07/11/2018    PSA 0.404 12/02/2016        Lab Results:  CBC:  Lab Results - Last 18 Months  Lab Units 07/11/18  0832   HEMOGLOBIN g/dL 16.5   HEMATOCRIT % 49.2   PLATELETS 10*3/mm3 183      BMP/CMP:  Lab Results - Last 18 Months  Lab Units 07/11/18  0832 05/26/17  0954   SODIUM mmol/L 138 138   POTASSIUM mmol/L 4.4 5.0   CHLORIDE mmol/L 97* 96*   TOTAL CO2, ARTERIAL mmol/L 28.0 29.0   GLUCOSE mg/dL 102* 97   BUN mg/dL 11 17   CREATININE mg/dL 1.00 1.12   EGFR IF NONAFRICN AM mL/min/1.73 78 68   EGFR IF AFRICN AM mL/min/1.73 94 83   CALCIUM mg/dL 10.9* 10.5*     HEPATIC:  Lab Results - Last 18 Months  Lab Units 07/11/18  0832   ALT (SGPT) U/L 37   AST (SGOT) U/L 34   ALK PHOS U/L 85     THYROID:  Lab Results - Last 18 Months  Lab Units 07/11/18  0832   TSH mIU/mL 1.680     A1C:  Lab Results - Last 18 Months  Lab Units 07/11/18  0832 08/25/17  0739 05/26/17  0954   HEMOGLOBIN A1C % 9.70 7.60 9.10     PSA:  Lab Results - Last 18 Months  Lab Units 07/11/18  0832   PSA ng/mL 0.518       Objective   /66 (BP Location: Left arm, Patient Position: Sitting)   Pulse (!) 48   Temp 98.4 °F (36.9 °C) (Oral)   Resp 18   Ht 180.3 cm (71\")   Wt 97.1 kg (214 lb)   SpO2 98%   BMI 29.85 kg/m²   Body mass index is 29.85 kg/m².    Physical Exam  GENERAL:  Well nourished/developed in no acute distress. Obese   SKIN: Turgor excellent, without wound, rash, lesion.  HEENT: Normal cephalic without trauma.  Pupils equal round reactive to light. Extraocular motions full without nystagmus.   External canals nonobstructive nontender without reddness. Tymphatic membranes vale with ottoniel structures intact.   Oral cavity without growths, exudates, and moist.  Posterior pharynx without mass, obstruction, redness.  No thyromegaly, mass, tenderness, lymphadenopathy and supple.  CV: Regular rhythm.  No murmur, gallop,  edema. Posterior pulses intact.  No carotid bruits.  CHEST: No chest wall tenderness or mass.   LUNGS: Symmetric " motion with clear to auscultation.   ABD: Soft, nontender without mass.   PROSTATE: No visible/palpable lesion/tenderness and anal tone intact/full.  Prostate 20 gm/smooth and nontender.  No rectal mass within reach. Stool on glove brown.   ORTHO: Symmetric extremities without swelling/point tenderness.  Full gross range of motion.  NEURO: CN 2-12 grossly intact.  Symmetric facies. 1/4 x bicep knee equal reflexes.  UE/LE   3-4/5 strength throughout.  Nonfocal use extremities. Speech clear.  Reduced light touch with monofilament, vibratory sensation with tuning fork; equal toes/distal feet.    PSYCH: Oriented x 3.  Pleasant calm, well kept.  Purposeful/directed conservation with intact short/long gross memory.       Assessment/Plan     1. Hypertension, unspecified type    2. Hyperlipidemia, unspecified hyperlipidemia type    3. Neuropathy    4. Other cardiac arrhythmia    5. Hypercalcemia-#    6. Bradycardia    7. Uncontrolled type 2 diabetes mellitus without complication, without long-term current use of insulin (CMS/[facility])        Rx: reviewed/changes:  Increase invokana 100 to 300    LAB/Testing/Referrals: reviewed/orders:   Today: procalcitonin, quanitative proteins/electrophoresis  Orders Placed This Encounter   Procedures   • PTH, Intact   • Protein Electrophoresis, Total     Usual:   Extra BMP, A1c around 10.13.18  6m CMP, A1c  12m CBC, CMP, A1c, LIPID, TSH, PSAs, B12, folate    Discussions:     Body mass index is 29.85 kg/m².   Patient's Body mass index is 29.85 kg/m². BMI is above normal parameters. Recommendations include: exercise counseling and nutrition counseling.  Non-smoker    There are no Patient Instructions on file for this visit.    Follow up: Return for extra lab 3m ;  , lab during/or just before next apt;, Dr Gandhi-.  Future Appointments  Date Time Provider Department Center   10/17/2018 8:30 AM LAB [facility] None   1/2/2019 8:45 AM LAB [facility] [place] [facility] None   1/9/2019  10:30 AM Davi Gandhi MD [facility] None

## 2018-07-16 LAB
ALBUMIN SERPL ELPH-MCNC: 4 G/DL (ref 2.9–4.4)
ALBUMIN/GLOB SERPL: 1.1 {RATIO} (ref 0.7–1.7)
ALPHA1 GLOB SERPL ELPH-MCNC: 0.2 G/DL (ref 0–0.4)
ALPHA2 GLOB SERPL ELPH-MCNC: 0.7 G/DL (ref 0.4–1)
B-GLOBULIN SERPL ELPH-MCNC: 1.1 G/DL (ref 0.7–1.3)
GAMMA GLOB SERPL ELPH-MCNC: 1.5 G/DL (ref 0.4–1.8)
GLOBULIN SER CALC-MCNC: 3.6 G/DL (ref 2.2–3.9)
LABORATORY COMMENT REPORT: NORMAL
M PROTEIN SERPL ELPH-MCNC: NORMAL G/DL
PROT SERPL-MCNC: 7.6 G/DL (ref 6–8.5)
PTH-INTACT SERPL-MCNC: 16 PG/ML (ref 15–65)

## 2018-09-19 DIAGNOSIS — E11.9 CONTROLLED TYPE 2 DIABETES MELLITUS WITHOUT COMPLICATION, WITHOUT LONG-TERM CURRENT USE OF INSULIN (HCC): Chronic | ICD-10-CM

## 2018-09-19 DIAGNOSIS — I10 ESSENTIAL HYPERTENSION: Chronic | ICD-10-CM

## 2018-09-19 RX ORDER — LISINOPRIL 10 MG/1
TABLET ORAL
Qty: 90 TABLET | Refills: 2 | Status: SHIPPED | OUTPATIENT
Start: 2018-09-19 | End: 2019-08-22 | Stop reason: SDUPTHER

## 2018-10-27 DIAGNOSIS — IMO0001 UNCONTROLLED TYPE 2 DIABETES MELLITUS WITHOUT COMPLICATION, WITHOUT LONG-TERM CURRENT USE OF INSULIN: ICD-10-CM

## 2018-10-30 DIAGNOSIS — E11.9 CONTROLLED TYPE 2 DIABETES MELLITUS WITHOUT COMPLICATION, WITHOUT LONG-TERM CURRENT USE OF INSULIN (HCC): Primary | Chronic | ICD-10-CM

## 2018-10-30 DIAGNOSIS — I10 HYPERTENSION, UNSPECIFIED TYPE: Chronic | ICD-10-CM

## 2018-10-31 ENCOUNTER — RESULTS ENCOUNTER (OUTPATIENT)
Dept: FAMILY MEDICINE CLINIC | Facility: CLINIC | Age: 58
End: 2018-10-31

## 2018-10-31 DIAGNOSIS — I10 HYPERTENSION, UNSPECIFIED TYPE: Chronic | ICD-10-CM

## 2018-10-31 DIAGNOSIS — E11.9 CONTROLLED TYPE 2 DIABETES MELLITUS WITHOUT COMPLICATION, WITHOUT LONG-TERM CURRENT USE OF INSULIN (HCC): Chronic | ICD-10-CM

## 2018-10-31 LAB
BUN SERPL-MCNC: 12 MG/DL (ref 5–21)
BUN/CREAT SERPL: 10.6 (ref 7–25)
CALCIUM SERPL-MCNC: 11.3 MG/DL (ref 8.4–10.4)
CHLORIDE SERPL-SCNC: 97 MMOL/L (ref 98–110)
CO2 SERPL-SCNC: 31 MMOL/L (ref 24–31)
CREAT SERPL-MCNC: 1.13 MG/DL (ref 0.5–1.4)
GLUCOSE SERPL-MCNC: 108 MG/DL (ref 70–100)
HBA1C MFR BLD: 8.7 %
POTASSIUM SERPL-SCNC: 5.1 MMOL/L (ref 3.5–5.3)
SODIUM SERPL-SCNC: 140 MMOL/L (ref 135–145)

## 2018-11-01 NOTE — PROGRESS NOTES
Pt informed lab is better, not perfect 8.7. Calcium is up Would like him to see ENT to see if they think he has a Parathyroid problem. Can move his appt here to discuss if he would rather.Wants to move appt up. Has been done

## 2018-11-13 ENCOUNTER — OFFICE VISIT (OUTPATIENT)
Dept: FAMILY MEDICINE CLINIC | Facility: CLINIC | Age: 58
End: 2018-11-13

## 2018-11-13 VITALS
HEART RATE: 84 BPM | BODY MASS INDEX: 29.96 KG/M2 | TEMPERATURE: 97.9 F | RESPIRATION RATE: 18 BRPM | WEIGHT: 214 LBS | DIASTOLIC BLOOD PRESSURE: 74 MMHG | HEIGHT: 71 IN | SYSTOLIC BLOOD PRESSURE: 122 MMHG | OXYGEN SATURATION: 99 %

## 2018-11-13 DIAGNOSIS — I49.8 OTHER CARDIAC ARRHYTHMIA: ICD-10-CM

## 2018-11-13 DIAGNOSIS — I10 HYPERTENSION, UNSPECIFIED TYPE: Chronic | ICD-10-CM

## 2018-11-13 DIAGNOSIS — J32.9 SINUSITIS, UNSPECIFIED CHRONICITY, UNSPECIFIED LOCATION: ICD-10-CM

## 2018-11-13 DIAGNOSIS — J06.9 ACUTE URI: ICD-10-CM

## 2018-11-13 DIAGNOSIS — E78.5 HYPERLIPIDEMIA, UNSPECIFIED HYPERLIPIDEMIA TYPE: Chronic | ICD-10-CM

## 2018-11-13 DIAGNOSIS — E83.52 HYPERCALCEMIA: ICD-10-CM

## 2018-11-13 DIAGNOSIS — E11.65 UNCONTROLLED TYPE 2 DIABETES MELLITUS WITH HYPERGLYCEMIA (HCC): ICD-10-CM

## 2018-11-13 PROCEDURE — 99214 OFFICE O/P EST MOD 30 MIN: CPT | Performed by: FAMILY MEDICINE

## 2018-11-13 RX ORDER — GLYBURIDE 5 MG/1
5 TABLET ORAL 2 TIMES DAILY WITH MEALS
Qty: 180 TABLET | Refills: 1 | Status: SHIPPED | OUTPATIENT
Start: 2018-11-13 | End: 2019-10-09 | Stop reason: SDUPTHER

## 2018-11-13 RX ORDER — AZITHROMYCIN 250 MG/1
TABLET, FILM COATED ORAL
Qty: 6 TABLET | Refills: 0 | Status: SHIPPED | OUTPATIENT
Start: 2018-11-13 | End: 2018-12-04

## 2018-11-14 LAB
25(OH)D3+25(OH)D2 SERPL-MCNC: 15.4 NG/ML (ref 30–100)
CA-I SERPL ISE-MCNC: 5 MG/DL (ref 4.5–5.6)
CALCIUM SERPL-MCNC: 9.5 MG/DL (ref 8.4–10.4)
PTH-INTACT SERPL-MCNC: 65 PG/ML (ref 15–65)

## 2018-11-15 DIAGNOSIS — R79.9 ABNORMAL BLOOD CHEMISTRY: Primary | ICD-10-CM

## 2018-12-04 ENCOUNTER — CLINICAL SUPPORT (OUTPATIENT)
Dept: OTOLARYNGOLOGY | Facility: CLINIC | Age: 58
End: 2018-12-04

## 2018-12-04 ENCOUNTER — OFFICE VISIT (OUTPATIENT)
Dept: OTOLARYNGOLOGY | Facility: CLINIC | Age: 58
End: 2018-12-04

## 2018-12-04 VITALS
WEIGHT: 210.5 LBS | TEMPERATURE: 97.3 F | DIASTOLIC BLOOD PRESSURE: 81 MMHG | BODY MASS INDEX: 29.47 KG/M2 | SYSTOLIC BLOOD PRESSURE: 116 MMHG | HEIGHT: 71 IN | HEART RATE: 86 BPM

## 2018-12-04 DIAGNOSIS — E83.52 HYPERCALCEMIA: ICD-10-CM

## 2018-12-04 DIAGNOSIS — E04.1 THYROID NODULE: Primary | ICD-10-CM

## 2018-12-04 DIAGNOSIS — E04.1 THYROID NODULE: ICD-10-CM

## 2018-12-04 PROCEDURE — 99203 OFFICE O/P NEW LOW 30 MIN: CPT | Performed by: NURSE PRACTITIONER

## 2018-12-04 PROCEDURE — 76536 US EXAM OF HEAD AND NECK: CPT | Performed by: NURSE PRACTITIONER

## 2018-12-04 NOTE — PROGRESS NOTES
YOB: 1960  Location: [facility]  Location Address: [address] [place] [address], [facility], [address] [place] [address]  Location Phone: [phone]    Chief Complaint   Patient presents with   • Thyroid Problem       History of Present Illness  Abdullahi Gomez is a 58 y.o. male.  Abdullahi Gomez is here for evaluation of ENT complaints. The patient has had problems with ?thyroid nodules, elevated calcium levels  The symptoms are not localized to a particular location. The patient has had improving symptoms. The symptoms have been present for the last several months The symptoms are aggravated by  no identifiable factors. The symptoms are improved by no identifiable factors.  He denies any overt symptoms.    Calcium   Order: [id number]   Status:  Final result   Visible to patient:  No (Not Released) Dx:  Hypercalcemia-watching    Ref Range & Units 3wk ago   Calcium 8.4 - 10.4 mg/dL 9.5    Resulting Agency  [facility]             PTH, Intact   Order: [id number]   Status:  Final result   Visible to patient:  No (Not Released) Dx:  Hypercalcemia-watching    Ref Range & Units 3wk ago   PTH, Intact 15 - 65 pg/mL 65    Resulting Agency  [facility]      Narrative           TSH   Order: [id number]   Status:  Final result   Visible to patient:  No (Not Released) Dx:  Neuropathy; Essential hypertension; W...    Ref Range & Units 4mo ago   TSH 0.470 - 4.680 mIU/mL 1.680    Resulting Agency  [facility]      Narrative   Performed                  Past Medical History:   Diagnosis Date   • Diabetes mellitus (CMS/HCC)    • Hypertension    • Premature ventricular beats        Past Surgical History:   Procedure Laterality Date   • APPENDECTOMY     • COLONOSCOPY  10/22/2013       Outpatient Medications Marked as Taking for the 18 encounter (Office Visit) with Pastora Hummel APRN   Medication Sig Dispense Refill   • aspirin 81 MG EC tablet Take 81 mg by mouth Daily.     • Canagliflozin (INVOKANA) 300 MG tablet Take 300 mg by mouth Daily. 90  tablet 1   • colesevelam (WELCHOL) 625 MG tablet Take 3 tablets by mouth 2 (Two) Times a Day With Meals. 540 tablet 1   • Exenatide ER (BYDUREON) 2 MG pen-injector Inject 2 mg under the skin Every 7 (Seven) Days. 12 each 3   • glyBURIDE (DIAbeta) 5 MG tablet Take 1 tablet by mouth 2 (Two) Times a Day With Meals. 180 tablet 1   • lisinopril (PRINIVIL,ZESTRIL) 10 MG tablet TAKE 1 TABLET DAILY 90 tablet 2   • metFORMIN (GLUCOPHAGE) 500 MG tablet TAKE 2 TABLETS 2 TIMES     DAILY WITH MEALS 360 tablet 2   • tadalafil (CIALIS) 20 MG tablet Take 20 mg by mouth Daily As Needed for erectile dysfunction.         Patient has no known allergies.    Family History   Problem Relation Age of Onset   • Diabetes Mother    • Stroke Mother    • Heart disease Father        Social History     Socioeconomic History   • Marital status:      Spouse name: Arlet   • Number of children: 3   • Years of education: 14   • Highest education level: Not on file   Social Needs   • Financial resource strain: Not on file   • Food insecurity - worry: Not on file   • Food insecurity - inability: Not on file   • Transportation needs - medical: Not on file   • Transportation needs - non-medical: Not on file   Occupational History   • Occupation: retired     Comment:  state of [place]   Tobacco Use   • Smoking status: Never Smoker   • Smokeless tobacco: Never Used   Substance and Sexual Activity   • Alcohol use: No   • Drug use: No   • Sexual activity: Defer   Other Topics Concern   • Not on file   Social History Narrative   • Not on file       Review of Systems   Constitutional: Negative.    HENT:        SEE HPI   Eyes: Negative.    Respiratory: Negative.    Cardiovascular: Negative.    Gastrointestinal: Negative.    Endocrine: Negative.    Genitourinary: Negative.    Musculoskeletal: Negative.    Skin: Negative.    Allergic/Immunologic: Negative.    Neurological: Negative.    Hematological: Negative.    Psychiatric/Behavioral:  Negative.        Vitals:    12/04/18 1441   BP: 116/81   Pulse: 86   Temp: 97.3 °F (36.3 °C)       Body mass index is 29.36 kg/m².    Objective     Physical Exam  CONSTITUTIONAL: well nourished, alert, oriented, in no acute distress     COMMUNICATION AND VOICE: able to communicate normally, normal voice quality    HEAD: normocephalic, no lesions, atraumatic, no tenderness, no masses     FACE: appearance normal, no lesions, no tenderness, no deformities, facial motion symmetric    SALIVARY GLANDS: parotid glands with no tenderness, no swelling, no masses, submandibular glands with normal size, nontender    EYES: ocular motility normal, eyelids normal, orbits normal, no proptosis, conjunctiva normal , pupils equal, round     EARS:  Hearing: response to conversational voice normal bilaterally   External Ears: auricles without lesions  Otoscopic: tympanic membrane appearance normal, no lesions, no perforation, normal mobility, no fluid    NOSE:  External Nose: structure normal, no tenderness on palpation, no nasal discharge, no lesions, no evidence of trauma, nostrils patent   Intranasal Exam: nasal mucosa normal, vestibule within normal limits, inferior turbinate normal, nasal septum midline     ORAL:  Lips: upper and lower lips without lesion   Teeth: dentition within normal limits for age   Gums: gingivae healthy   Oral Mucosa: oral mucosa normal, no mucosal lesions   Floor of Mouth: Warthin’s duct patent, mucosa normal  Tongue: lingual mucosa normal without lesions, normal tongue mobility   Palate: soft and hard palates with normal mucosa and structure  Oropharynx: oropharyngeal mucosa normal    NECK: neck appearance normal, no mass,  noted without erythema or tenderness    THYROID: no overt thyromegaly, no tenderness, nodules or mass present on palpation, position midline     LYMPH NODES: no lymphadenopathy    CHEST/RESPIRATORY: respiratory effort normal,     CARDIOVASCULAR:extremities without cyanosis or edema       NEUROLOGIC/PSYCHIATRIC: oriented to time, place and person, mood normal, affect appropriate, CN II-XII intact grossly    Assessment/Plan   Abdullahi was seen today for thyroid problem.    Diagnoses and all orders for this visit:    Thyroid nodule r/o  -     US Head Neck Soft Tissue    Hypercalcemia resolved      * Surgery not found *  Orders Placed This Encounter   Procedures   • US Head Neck Soft Tissue     Order Specific Question:   Reason for Exam:     Answer:   globus sensation, r/o thyroid nodules, difficulty swallowing     Return if symptoms worsen or fail to improve.       Patient Instructions   No signs of thyroid or parathyroid abnormalities at this time    Recommend PCP to followup serial calcium levels and intervene with endocrinology as needed

## 2018-12-05 NOTE — PATIENT INSTRUCTIONS
No signs of thyroid or parathyroid abnormalities at this time    Recommend PCP to followup serial calcium levels and intervene with endocrinology as needed

## 2018-12-17 ENCOUNTER — OFFICE VISIT (OUTPATIENT)
Dept: FAMILY MEDICINE CLINIC | Facility: CLINIC | Age: 58
End: 2018-12-17

## 2018-12-17 VITALS
OXYGEN SATURATION: 98 % | DIASTOLIC BLOOD PRESSURE: 70 MMHG | TEMPERATURE: 97.5 F | HEIGHT: 71 IN | HEART RATE: 100 BPM | WEIGHT: 206 LBS | RESPIRATION RATE: 18 BRPM | SYSTOLIC BLOOD PRESSURE: 92 MMHG | BODY MASS INDEX: 28.84 KG/M2

## 2018-12-17 DIAGNOSIS — E78.5 HYPERLIPIDEMIA, UNSPECIFIED HYPERLIPIDEMIA TYPE: Chronic | ICD-10-CM

## 2018-12-17 DIAGNOSIS — E11.65 UNCONTROLLED TYPE 2 DIABETES MELLITUS WITH HYPERGLYCEMIA (HCC): ICD-10-CM

## 2018-12-17 DIAGNOSIS — I10 HYPERTENSION, UNSPECIFIED TYPE: Primary | Chronic | ICD-10-CM

## 2018-12-17 DIAGNOSIS — E83.52 HYPERCALCEMIA: ICD-10-CM

## 2018-12-17 PROCEDURE — 99213 OFFICE O/P EST LOW 20 MIN: CPT | Performed by: FAMILY MEDICINE

## 2018-12-17 NOTE — PROGRESS NOTES
Subjective   Abdullahi Gomez is a 58 y.o. male presenting with chief complaint of:   Chief Complaint   Patient presents with   • Thyroid Problem     Follow-up from labs.       History of Present Illness :  Alone.  Here for primarily an acute issue today; past Ca elevation that has since gone down.  PTH has however stayed up (without renal explaination).  Imaging by BH/ENT found nothing yet.   Has multiple chronic problems to consider that might have a bearing on today's issues; somewhat an interval appointment.       Chronic/acute problems reviewed today:   1. Hypertension, unspecified type: Chronic/stable. Stable here/if home blood pressures.  No significant chest pain, SOB, LE edema, orthopnea, near syncope, dizziness/light headness.      2. Hypercalcemia-watching: chronic/as above.  Denies muscle spasms.    3. Hyperlipidemia, unspecified hyperlipidemia type: Chronic/stable.  Tolerated use of welchol with labs showing improved lipid values and tolerant liver labs. No muscle aches unexpected.      4. Uncontrolled type 2 diabetes mellitus with hyperglycemia (CMS/[facility]): chronic/hoping next A1c better. Chronic/stable. No problem/pattern hypoglycemia/hyperglycemia manifest by poly- dypsia, phagia, uria, or sweats, diaphoretic episodes, syncope/near.        Has an/another acute issue today: none.    The following portions of the patient's history were reviewed and updated as appropriate: allergies, current medications, past family history, past medical history, past social history, past surgical history and problem list.      Current Outpatient Medications:   •  aspirin 81 MG EC tablet, Take 81 mg by mouth Daily., Disp: , Rfl:   •  Canagliflozin (INVOKANA) 300 MG tablet, Take 300 mg by mouth Daily., Disp: 90 tablet, Rfl: 1  •  colesevelam (WELCHOL) 625 MG tablet, Take 3 tablets by mouth 2 (Two) Times a Day With Meals., Disp: 540 tablet, Rfl: 1  •  Exenatide ER (BYDUREON) 2 MG pen-injector, Inject 2 mg under the skin  Every 7 (Seven) Days., Disp: 12 each, Rfl: 3  •  glyBURIDE (DIAbeta) 5 MG tablet, Take 1 tablet by mouth 2 (Two) Times a Day With Meals., Disp: 180 tablet, Rfl: 1  •  lisinopril (PRINIVIL,ZESTRIL) 10 MG tablet, TAKE 1 TABLET DAILY, Disp: 90 tablet, Rfl: 2  •  metFORMIN (GLUCOPHAGE) 500 MG tablet, TAKE 2 TABLETS 2 TIMES     DAILY WITH MEALS, Disp: 360 tablet, Rfl: 2  •  tadalafil (CIALIS) 20 MG tablet, Take 20 mg by mouth Daily As Needed for erectile dysfunction., Disp: , Rfl:     No problems with medications.  Refills if needed done    No Known Allergies    Review of Systems  GENERAL:  Active/slower with limits, speed, stamina for age. Sleep is ok. No fever now.  ENDO:  No syncope, near or diaphoretic sweaty spells.  BS better;  no download noted.  HEENT: No head injury or headache,  No vision change, No significant hearing loss.  Ears without pain/drainage.  No sore throat.  No usual significant nasal/sinus congestion/drainage; worse couple weeks.  No epistaxis.  CHEST: No chest wall tenderness or mass. No usual significant cough, without wheeze.  No SOB; no hemoptysis.  CV: No chest pain, palpitations, ankle edema.  GI: No heartburn, dysphagia.  No abdominal pain, diarrhea, constipation.  No rectal bleeding, or melena.    :  Voids without dysuria, or incontinence to completion.   ORTHO: No painful/swollen joints but various on /off sore.  No  sore neck or back.  No acute neck or back pain without recent injury.   NEURO: No dizziness, weakness of extremities.  No change occ numbness/paresthesias.   Two weeks tics of L cheek.   PSYCH: No memory loss.  Mood good; not anxious, depressed   Screening:  Mammogram: NA  Bone density: NA  Low dose CT chest: NA  GI: Colon-nl/[facility]/6.12.17/10y  Prostate: 7.13.18  Usual lab order  6m CMP, A1c  12m CBC, CMP, A1c, LIPID, TSH, PSAs, B12, folate    Results for orders placed or performed in visit on 11/13/18   Calcium, Ionized   Result Value Ref Range    Ionized Calcium 5.0  "4.5 - 5.6 mg/dL   PTH, Intact   Result Value Ref Range    PTH, Intact 65 15 - 65 pg/mL   Vitamin D 25 Hydroxy   Result Value Ref Range    25 Hydroxy, Vitamin D 15.4 (L) 30.0 - 100.0 ng/ml   Calcium   Result Value Ref Range    Calcium 9.5 8.4 - 10.4 mg/dL       Lab Results   Component Value Date    PSA 0.518 07/11/2018    PSA 0.404 12/02/2016        Lab Results:  CBC:  Lab Results - Last 18 Months   Lab Units  07/11/18   0832   HEMOGLOBIN g/dL  16.5   HEMATOCRIT %  49.2   PLATELETS 10*3/mm3  183      BMP/CMP:  Lab Results - Last 18 Months   Lab Units  11/13/18   1209  10/31/18   0712  07/11/18   0832   SODIUM mmol/L   --   140  138   POTASSIUM mmol/L   --   5.1  4.4   CHLORIDE mmol/L   --   97*  97*   TOTAL CO2 mmol/L   --   31.0  28.0   GLUCOSE mg/dL   --   108*  102*   BUN mg/dL   --   12  11   CREATININE mg/dL   --   1.13  1.00   EGFR IF NONAFRICN AM mL/min/1.73   --   67  78   EGFR IF AFRICN AM mL/min/1.73   --   81  94   CALCIUM mg/dL  9.5  11.3*  10.9*     HEPATIC:  Lab Results - Last 18 Months   Lab Units  07/11/18   0832   ALT (SGPT) U/L  37   AST (SGOT) U/L  34   ALK PHOS U/L  85     THYROID:  Lab Results - Last 18 Months   Lab Units  07/11/18   0832   TSH mIU/mL  1.680     A1C:  Lab Results - Last 18 Months   Lab Units  10/31/18   0712  07/11/18   0832  08/25/17   0739   HEMOGLOBIN A1C %  8.70  9.70  7.60     PSA:  Lab Results - Last 18 Months   Lab Units  07/11/18   0832   PSA ng/mL  0.518       Objective   BP 92/70 (BP Location: Left arm, Patient Position: Sitting, Cuff Size: Adult)   Pulse 100   Temp 97.5 °F (36.4 °C) (Oral)   Resp 18   Ht 180.3 cm (71\")   Wt 93.4 kg (206 lb)   SpO2 98%   BMI 28.73 kg/m²   Body mass index is 28.73 kg/m².    Physical Exam  GENERAL:  Well nourished/developed in no acute distress. Obese   SKIN: Turgor excellent, without wound, rash, lesion.  HEENT: Normal cephalic without trauma.  Pupils equal round reactive to light. Extraocular motions full without nystagmus. "   External canals nonobstructive nontender without reddness. Tymphatic membranes vale with ottoniel structures intact.   Oral cavity without growths, exudates, and moist.  Posterior pharynx without mass, obstruction, redness.  No thyromegaly, mass, tenderness, lymphadenopathy and supple.  CV: Regular rhythm.  No murmur, gallop,  edema. Posterior pulses intact.  No carotid bruits.  CHEST: No chest wall tenderness or mass.   LUNGS: Symmetric motion with clear to auscultation.   ABD: Soft, nontender without mass.   PROSTATE: No visible/palpable lesion/tenderness and anal tone intact/full.  Prostate 20 gm/smooth and nontender.  No rectal mass within reach. Stool on glove brown.   ORTHO: Symmetric extremities without swelling/point tenderness.  Full gross range of motion.  NEURO: CN 2-12 grossly intact.  Symmetric facies. 1/4 x bicep knee equal reflexes.  UE/LE   3-4/5 strength throughout.  Nonfocal use extremities. Speech clear.  Reduced light touch with monofilament, vibratory sensation with tuning fork; equal toes/distal feet.    PSYCH: Oriented x 3.  Pleasant calm, well kept.  Purposeful/directed conservation with intact short/long gross memory.     Assessment/Plan     1. Hypertension, unspecified type    2. Hypercalcemia-watching    3. Hyperlipidemia, unspecified hyperlipidemia type    4. Uncontrolled type 2 diabetes mellitus with hyperglycemia (CMS/[facility])        Rx: reviewed/changes:  No orders of the defined types were placed in this encounter.      LAB/Testing/Referrals: reviewed/orders:   Today: none  No orders of the defined types were placed in this encounter.  Extra with next lab 5.2019 PTH  6m CMP, A1c  12m CBC, CMP, A1c, LIPID, TSH, PSAs, B12, folate    Discussions:   Watching Ca-maybe referral [place] if PTH stays up and Ca goes back up.   Body mass index is 28.73 kg/m².   Patient's Body mass index is 28.73 kg/m². BMI is within normal parameters. No follow-up required.  Non-smoker    There are no Patient  Instructions on file for this visit.    Follow up: Return for lab;, Dr Gandhi-, as planned;.  Future Appointments   Date Time Provider Department Center   5/15/2019  8:40 AM LAB PC DO [facility] None   5/22/2019  9:00 AM Davi Gandhi MD [facility] None

## 2019-02-27 ENCOUNTER — TELEPHONE (OUTPATIENT)
Dept: FAMILY MEDICINE CLINIC | Facility: CLINIC | Age: 59
End: 2019-02-27

## 2019-02-27 DIAGNOSIS — E11.65 UNCONTROLLED TYPE 2 DIABETES MELLITUS WITH HYPERGLYCEMIA (HCC): Primary | ICD-10-CM

## 2019-02-27 RX ORDER — COLESEVELAM 180 1/1
1875 TABLET ORAL 2 TIMES DAILY WITH MEALS
Qty: 540 TABLET | Refills: 3 | Status: SHIPPED | OUTPATIENT
Start: 2019-02-27 | End: 2020-08-28

## 2019-02-27 NOTE — TELEPHONE ENCOUNTER
"\"my prescription insurance will not cover the bydureon and needs changed to ozempic, trulicity, victoza?\"    Also need refill of welchol to [facility] and done  "

## 2019-02-28 ENCOUNTER — TELEPHONE (OUTPATIENT)
Dept: FAMILY MEDICINE CLINIC | Facility: CLINIC | Age: 59
End: 2019-02-28

## 2019-02-28 DIAGNOSIS — E11.65 UNCONTROLLED TYPE 2 DIABETES MELLITUS WITH HYPERGLYCEMIA (HCC): Primary | ICD-10-CM

## 2019-02-28 NOTE — TELEPHONE ENCOUNTER
Fax from [facility], insurance will not cover Invokana 300mg and needs to be changed to Farxiga, Jardiance? Or needs PA

## 2019-02-28 NOTE — TELEPHONE ENCOUNTER
Spoke to wife and advised will have sample for the starting dose and second step sent to mail order for 90 days

## 2019-04-02 ENCOUNTER — PRIOR AUTHORIZATION (OUTPATIENT)
Dept: FAMILY MEDICINE CLINIC | Facility: CLINIC | Age: 59
End: 2019-04-02

## 2019-04-02 DIAGNOSIS — E11.65 UNCONTROLLED TYPE 2 DIABETES MELLITUS WITH HYPERGLYCEMIA (HCC): ICD-10-CM

## 2019-04-17 NOTE — TELEPHONE ENCOUNTER
"Pt here \"Im having trouble getting my trulisity from my mail order\" pt had a copy of his approval, resubmitted new Rx to [facility], advised pt to let me know if he doesn't receive it and can provide a sample if needed  "

## 2019-05-14 DIAGNOSIS — I10 HYPERTENSION, UNSPECIFIED TYPE: ICD-10-CM

## 2019-05-14 DIAGNOSIS — E11.65 UNCONTROLLED TYPE 2 DIABETES MELLITUS WITH HYPERGLYCEMIA (HCC): ICD-10-CM

## 2019-05-14 DIAGNOSIS — E83.52 HYPERCALCEMIA: Primary | ICD-10-CM

## 2019-05-15 ENCOUNTER — RESULTS ENCOUNTER (OUTPATIENT)
Dept: FAMILY MEDICINE CLINIC | Facility: CLINIC | Age: 59
End: 2019-05-15

## 2019-05-15 DIAGNOSIS — I10 HYPERTENSION, UNSPECIFIED TYPE: ICD-10-CM

## 2019-05-15 DIAGNOSIS — E11.65 UNCONTROLLED TYPE 2 DIABETES MELLITUS WITH HYPERGLYCEMIA (HCC): ICD-10-CM

## 2019-05-15 DIAGNOSIS — E83.52 HYPERCALCEMIA: ICD-10-CM

## 2019-05-16 PROBLEM — N28.9 RENAL INSUFFICIENCY: Status: ACTIVE | Noted: 2019-05-16

## 2019-05-16 LAB
ALBUMIN SERPL-MCNC: 4.2 G/DL (ref 3.5–5.2)
ALBUMIN/GLOB SERPL: 1.3 G/DL
ALP SERPL-CCNC: 113 U/L (ref 39–117)
ALT SERPL-CCNC: 20 U/L (ref 1–41)
AST SERPL-CCNC: 24 U/L (ref 1–40)
BILIRUB SERPL-MCNC: 0.8 MG/DL (ref 0.2–1.2)
BUN SERPL-MCNC: 20 MG/DL (ref 6–20)
BUN/CREAT SERPL: 14.2 (ref 7–25)
CALCIUM SERPL-MCNC: 10.4 MG/DL (ref 8.6–10.5)
CHLORIDE SERPL-SCNC: 98 MMOL/L (ref 98–107)
CO2 SERPL-SCNC: 25.2 MMOL/L (ref 22–29)
CREAT SERPL-MCNC: 1.41 MG/DL (ref 0.76–1.27)
GLOBULIN SER CALC-MCNC: 3.2 GM/DL
GLUCOSE SERPL-MCNC: 114 MG/DL (ref 65–99)
HBA1C MFR BLD: 9.3 % (ref 4.8–5.6)
POTASSIUM SERPL-SCNC: 5 MMOL/L (ref 3.5–5.2)
PROT SERPL-MCNC: 7.4 G/DL (ref 6–8.5)
PTH-INTACT SERPL-MCNC: 24 PG/ML (ref 15–65)
SODIUM SERPL-SCNC: 137 MMOL/L (ref 136–145)

## 2019-05-17 DIAGNOSIS — N28.9 RENAL INSUFFICIENCY: Primary | ICD-10-CM

## 2019-05-23 ENCOUNTER — RESULTS ENCOUNTER (OUTPATIENT)
Dept: FAMILY MEDICINE CLINIC | Facility: CLINIC | Age: 59
End: 2019-05-23

## 2019-05-23 DIAGNOSIS — N28.9 RENAL INSUFFICIENCY: ICD-10-CM

## 2019-05-24 LAB
BUN SERPL-MCNC: 16 MG/DL (ref 6–20)
BUN/CREAT SERPL: 12.5 (ref 7–25)
CALCIUM SERPL-MCNC: 10.3 MG/DL (ref 8.6–10.5)
CHLORIDE SERPL-SCNC: 98 MMOL/L (ref 98–107)
CO2 SERPL-SCNC: 26.8 MMOL/L (ref 22–29)
CREAT SERPL-MCNC: 1.28 MG/DL (ref 0.76–1.27)
GLUCOSE SERPL-MCNC: 132 MG/DL (ref 65–99)
POTASSIUM SERPL-SCNC: 4.9 MMOL/L (ref 3.5–5.2)
SODIUM SERPL-SCNC: 135 MMOL/L (ref 136–145)

## 2019-06-10 ENCOUNTER — OFFICE VISIT (OUTPATIENT)
Dept: FAMILY MEDICINE CLINIC | Facility: CLINIC | Age: 59
End: 2019-06-10

## 2019-06-10 VITALS
HEIGHT: 71 IN | HEART RATE: 95 BPM | SYSTOLIC BLOOD PRESSURE: 92 MMHG | TEMPERATURE: 98.6 F | DIASTOLIC BLOOD PRESSURE: 60 MMHG | OXYGEN SATURATION: 98 % | RESPIRATION RATE: 18 BRPM | BODY MASS INDEX: 29.4 KG/M2 | WEIGHT: 210 LBS

## 2019-06-10 DIAGNOSIS — E83.52 HYPERCALCEMIA: ICD-10-CM

## 2019-06-10 DIAGNOSIS — E78.2 MIXED HYPERLIPIDEMIA: Chronic | ICD-10-CM

## 2019-06-10 DIAGNOSIS — E11.65 UNCONTROLLED TYPE 2 DIABETES MELLITUS WITH HYPERGLYCEMIA (HCC): ICD-10-CM

## 2019-06-10 DIAGNOSIS — I10 ESSENTIAL HYPERTENSION: Chronic | ICD-10-CM

## 2019-06-10 DIAGNOSIS — N28.9 RENAL INSUFFICIENCY: ICD-10-CM

## 2019-06-10 DIAGNOSIS — I49.8 OTHER CARDIAC ARRHYTHMIA: ICD-10-CM

## 2019-06-10 PROCEDURE — 99213 OFFICE O/P EST LOW 20 MIN: CPT | Performed by: FAMILY MEDICINE

## 2019-06-10 NOTE — PATIENT INSTRUCTIONS
Cautiously increase your glyburide 5 mg twice a day to three a day and even two twice a day.       Home blood sugar monitoring advised.  We advise you check your blood sugar 1 times a day.   We advise you check mostly fasting with the upcoming Rx change and then random; get a feel for where your BSs are.   1. Before breakfast  2. 2 hrs after breakfast  3. Before lunch  4. 2 hrs after lunch  5. Before dinner/supper  6. 2 hrs after dinner/supper  7. Bedtime

## 2019-06-10 NOTE — PROGRESS NOTES
Subjective   Abdullahi Gomez is a 58 y.o. male presenting with chief complaint of:   Chief Complaint   Patient presents with   • Hypertension     6 month follow up   • Hyperlipidemia   • Diabetes       History of Present Illness :  Alone.   Has multiple chronic problems to consider that might have a bearing on today's issues;  an interval appointment.       Chronic/acute problems reviewed today:   1. Essential hypertension: Chronic/stable. Stable here/if home blood pressures.  No significant chest pain, SOB, LE edema, orthopnea, near syncope, dizziness/light headness.      2. Mixed hyperlipidemia: Chronic/stable.  Tolerated use of welchol with labs showing improved lipid values and tolerant liver labs. No muscle aches unexpected.      3. Other cardiac arrhythmia: Chronic/stable occasional palpitation to this point cultures of only showing benign PVCs.  No syncope near syncope.   4. Uncontrolled type 2 diabetes mellitus without complication, without long-term current use of insulin (CMS/[facility]) chronic/elevated blood sugar for a period of time.  Trulicity has helped; he would like to give things more time before considering insulin.   5. Renal insufficiency chronic/variable and difficult to pin on whether this is a chronic level of deficiency or periodic acute declines.   6. Hypercalcemia-watching chronic/resolved.  Benign with no findings for hyperparathyroidism and has returned to normal.     Has an/another acute issue today: none.    The following portions of the patient's history were reviewed and updated as appropriate: allergies, current medications, past family history, past medical history, past social history, past surgical history and problem list.      Current Outpatient Medications:   •  aspirin 81 MG EC tablet, Take 81 mg by mouth Daily., Disp: , Rfl:   •  colesevelam (WELCHOL) 625 MG tablet, Take 3 tablets by mouth 2 (Two) Times a Day With Meals., Disp: 540 tablet, Rfl: 3  •  Dulaglutide (TRULICITY) 1.5  MG/0.5ML solution pen-injector, Inject 1.5 mg under the skin into the appropriate area as directed 1 (One) Time Per Week., Disp: 12 pen, Rfl: 3  •  Empagliflozin (JARDIANCE) 25 MG tablet, Take 25 mg by mouth Daily., Disp: 90 tablet, Rfl: 1  •  glyBURIDE (DIAbeta) 5 MG tablet, Take 1 tablet by mouth 2 (Two) Times a Day With Meals., Disp: 180 tablet, Rfl: 1  •  lisinopril (PRINIVIL,ZESTRIL) 10 MG tablet, TAKE 1 TABLET DAILY, Disp: 90 tablet, Rfl: 2  •  metFORMIN (GLUCOPHAGE) 500 MG tablet, TAKE 2 TABLETS 2 TIMES     DAILY WITH MEALS, Disp: 360 tablet, Rfl: 2  •  tadalafil (CIALIS) 20 MG tablet, Take 20 mg by mouth Daily As Needed for erectile dysfunction., Disp: , Rfl:     No problems with medications.  Refills if needed done    No Known Allergies    Review of Systems  GENERAL:  Active/slower with limits, speed, stamina for age. Sleep is ok. No fever now.  ENDO:  No syncope, near or diaphoretic sweaty spells.  BS variable;  no download noted.  HEENT: No head injury or headache.   No vision change.  No significant hearing loss.  Ears without pain/drainage.  No sore throat.  No usual significant nasal/sinus congestion/drainage; worse couple weeks.  No epistaxis.  CHEST: No chest wall tenderness or mass. No usual significant cough, without wheeze.  No SOB; no hemoptysis.  CV: No chest pain, palpitations, ankle edema.  GI: No heartburn, dysphagia.  No abdominal pain, diarrhea, constipation.  No rectal bleeding, or melena.    :  Voids without dysuria, or incontinence to completion.   ORTHO: No painful/swollen joints but various on /off sore.  No  sore neck or back.  No acute neck or back pain without recent injury.   NEURO: No dizziness, weakness of extremities.  No change occ numbness/paresthesias.   Two weeks tics of L cheek.   PSYCH: No memory loss.  Mood good; not anxious, depressed   Screening:  Mammogram: NA  Bone density: NA  Low dose CT chest: NA  GI: Colon-nl/[facility]/6.12.17/10y  Prostate: 7.13.18  Usual lab  "order  6m CMP, A1c  12m CBC, CMP, A1c, LIPID, TSH, PSAs, B12, folate    Lab Results:  Results for orders placed or performed in visit on 05/23/19   Basic Metabolic Panel   Result Value Ref Range    Glucose 132 (H) 65 - 99 mg/dL    BUN 16 6 - 20 mg/dL    Creatinine 1.28 (H) 0.76 - 1.27 mg/dL    eGFR Non African Am 58 (L) >60 mL/min/1.73    eGFR African Am 70 >60 mL/min/1.73    BUN/Creatinine Ratio 12.5 7.0 - 25.0    Sodium 135 (L) 136 - 145 mmol/L    Potassium 4.9 3.5 - 5.2 mmol/L    Chloride 98 98 - 107 mmol/L    Total CO2 26.8 22.0 - 29.0 mmol/L    Calcium 10.3 8.6 - 10.5 mg/dL       A1C:  Lab Results - Last 18 Months   Lab Units 05/15/19  0746 10/31/18  0712 07/11/18  0832   HEMOGLOBIN A1C % 9.30* 8.70 9.70     PSA:  Lab Results - Last 18 Months   Lab Units 07/11/18  0832   PSA ng/mL 0.518     CBC:  Lab Results - Last 18 Months   Lab Units 07/11/18  0832   WBC 10*3/mm3 6.77   HEMOGLOBIN g/dL 16.5   HEMATOCRIT % 49.2   PLATELETS 10*3/mm3 183      BMP/CMP:  Lab Results - Last 18 Months   Lab Units 05/23/19  0724 05/15/19  0746 11/13/18  1209 10/31/18  0712 07/11/18  0832   SODIUM mmol/L 135* 137  --  140 138   POTASSIUM mmol/L 4.9 5.0  --  5.1 4.4   CHLORIDE mmol/L 98 98  --  97* 97*   TOTAL CO2 mmol/L 26.8 25.2  --  31.0 28.0   GLUCOSE mg/dL 132* 114*  --  108* 102*   BUN mg/dL 16 20  --  12 11   CREATININE mg/dL 1.28* 1.41*  --  1.13 1.00   EGFR IF NONAFRICN AM mL/min/1.73 58* 52*  --  67 78   EGFR IF AFRICN AM mL/min/1.73 70 63  --  81 94   CALCIUM mg/dL 10.3 10.4 9.5 11.3* 10.9*     HEPATIC:  Lab Results - Last 18 Months   Lab Units 05/15/19  0746 07/11/18  0832   ALT (SGPT) U/L 20 37   AST (SGOT) U/L 24 34   ALK PHOS U/L 113 85     THYROID:  Lab Results - Last 18 Months   Lab Units 07/11/18  0832   TSH mIU/mL 1.680       Objective   BP 92/60 (BP Location: Left arm, Patient Position: Sitting, Cuff Size: Adult)   Pulse 95   Temp 98.6 °F (37 °C) (Oral)   Resp 18   Ht 180.3 cm (71\")   Wt 95.3 kg (210 lb)   " SpO2 98%   BMI 29.29 kg/m²   Body mass index is 29.29 kg/m².    Physical Exam  GENERAL:  Well nourished/developed in no acute distress. Obese   SKIN: Turgor excellent, without wound, rash, lesion.  HEENT: Normal cephalic without trauma.  Pupils equal round reactive to light. Extraocular motions full without nystagmus.   External canals nonobstructive nontender without reddness. Tymphatic membranes vale with ottoniel structures intact.   Oral cavity without growths, exudates, and moist.  Posterior pharynx without mass, obstruction, redness.  No thyromegaly, mass, tenderness, lymphadenopathy and supple.  CV: Regular rhythm.  No murmur, gallop,  edema. Posterior pulses intact.  No carotid bruits.  CHEST: No chest wall tenderness or mass.   LUNGS: Symmetric motion with clear to auscultation.   ABD: Soft, nontender without mass.    ORTHO: Symmetric extremities without swelling/point tenderness.  Full gross range of motion.  NEURO: CN 2-12 grossly intact.  Symmetric facies. 1/4 x bicep knee equal reflexes.  UE/LE   3-4/5 strength throughout.  Nonfocal use extremities. Speech clear.  Reduced light touch with monofilament, vibratory sensation with tuning fork; equal toes/distal feet.    PSYCH: Oriented x 3.  Pleasant calm, well kept.  Purposeful/directed conservation with intact short/long gross memory.     Assessment/Plan     1. Essential hypertension    2. Mixed hyperlipidemia    3. Other cardiac arrhythmia    4. Controlled type 2 diabetes mellitus without complication, without long-term current use of insulin (CMS/[facility])    5. Renal insufficiency    6. Hypercalcemia-watching        Rx: reviewed/changes:  No orders of the defined types were placed in this encounter.      LAB/Testing/Referrals: reviewed/orders:   Today: same  No orders of the defined types were placed in this encounter.    Chronic/recurrent labs above or change to:   same     Discussions:   Needs to show some improvement in blood sugar control her move on to  more therapies.  Body mass index is 29.29 kg/m².  Patient's Body mass index is 29.29 kg/m². BMI is above normal parameters. Recommendations include: exercise counseling and nutrition counseling.    Tobacco use reviewed:  Non-smoker    Patient Instructions   Cautiously increase your glyburide 5 mg twice a day to three a day and even two twice a day.       Home blood sugar monitoring advised.  We advise you check your blood sugar 1 times a day.   We advise you check mostly fasting with the upcoming Rx change and then random; get a feel for where your BSs are.   1. Before breakfast  2. 2 hrs after breakfast  3. Before lunch  4. 2 hrs after lunch  5. Before dinner/supper  6. 2 hrs after dinner/supper  7. Bedtime             Follow up: Return for lab/Dr Gandhi 6m.  Future Appointments   Date Time Provider Department Center   12/4/2019  8:10 AM LAB PC [place] [facility] None   12/11/2019  9:00 AM Davi Gandhi MD [facility] None

## 2019-07-22 ENCOUNTER — OFFICE VISIT (OUTPATIENT)
Dept: FAMILY MEDICINE CLINIC | Facility: CLINIC | Age: 59
End: 2019-07-22

## 2019-07-22 ENCOUNTER — TELEPHONE (OUTPATIENT)
Dept: FAMILY MEDICINE CLINIC | Facility: CLINIC | Age: 59
End: 2019-07-22

## 2019-07-22 VITALS
SYSTOLIC BLOOD PRESSURE: 126 MMHG | RESPIRATION RATE: 18 BRPM | OXYGEN SATURATION: 96 % | TEMPERATURE: 98.5 F | WEIGHT: 210 LBS | DIASTOLIC BLOOD PRESSURE: 80 MMHG | BODY MASS INDEX: 29.4 KG/M2 | HEIGHT: 71 IN | HEART RATE: 104 BPM

## 2019-07-22 DIAGNOSIS — E11.65 UNCONTROLLED TYPE 2 DIABETES MELLITUS WITH HYPERGLYCEMIA (HCC): Primary | ICD-10-CM

## 2019-07-22 DIAGNOSIS — G62.9 NEUROPATHY: Chronic | ICD-10-CM

## 2019-07-22 DIAGNOSIS — E13.621 DIABETIC FOOT ULCER ASSOCIATED WITH OTHER SPECIFIED DIABETES MELLITUS, UNSPECIFIED LATERALITY, UNSPECIFIED PART OF FOOT, UNSPECIFIED ULCER STAGE (HCC): ICD-10-CM

## 2019-07-22 DIAGNOSIS — I10 ESSENTIAL HYPERTENSION: Chronic | ICD-10-CM

## 2019-07-22 DIAGNOSIS — L97.509 DIABETIC FOOT ULCER ASSOCIATED WITH OTHER SPECIFIED DIABETES MELLITUS, UNSPECIFIED LATERALITY, UNSPECIFIED PART OF FOOT, UNSPECIFIED ULCER STAGE (HCC): ICD-10-CM

## 2019-07-22 PROCEDURE — 99214 OFFICE O/P EST MOD 30 MIN: CPT | Performed by: FAMILY MEDICINE

## 2019-07-22 NOTE — PROGRESS NOTES
Subjective   Abdullahi Gomez is a 59 y.o. male presenting with chief complaint of:   Chief Complaint   Patient presents with   • Diabetes     Lft foot ulcer       History of Present Illness :  Alone.  Here for primarily an acute issue today; ulcer on L foot.  Has had about a month.  Not healing like time he had rodent ulcer.   Has multiple chronic problems to consider that might have a bearing on today's issues; not an interval appointment.       Chronic/acute problems reviewed today:   1. Uncontrolled type 2 diabetes mellitus with hyperglycemia (CMS/[facility])    2. Diabetic foot ulcer associated with other specified diabetes mellitus, unspecified laterality, unspecified part of foot, unspecified ulcer stage (CMS/[facility])    3. Essential hypertension    4. Neuropathy-offered Rx      Has an/another acute issue today: none.    The following portions of the patient's history were reviewed and updated as appropriate: allergies, current medications, past family history, past medical history, past social history, past surgical history and problem list.      Current Outpatient Medications:   •  aspirin 81 MG EC tablet, Take 81 mg by mouth Daily., Disp: , Rfl:   •  colesevelam (WELCHOL) 625 MG tablet, Take 3 tablets by mouth 2 (Two) Times a Day With Meals., Disp: 540 tablet, Rfl: 3  •  Dulaglutide (TRULICITY) 1.5 MG/0.5ML solution pen-injector, Inject 1.5 mg under the skin into the appropriate area as directed 1 (One) Time Per Week., Disp: 12 pen, Rfl: 3  •  Empagliflozin (JARDIANCE) 25 MG tablet, Take 25 mg by mouth Daily., Disp: 90 tablet, Rfl: 1  •  glyBURIDE (DIAbeta) 5 MG tablet, Take 1 tablet by mouth 2 (Two) Times a Day With Meals., Disp: 180 tablet, Rfl: 1  •  lisinopril (PRINIVIL,ZESTRIL) 10 MG tablet, TAKE 1 TABLET DAILY, Disp: 90 tablet, Rfl: 2  •  metFORMIN (GLUCOPHAGE) 500 MG tablet, TAKE 2 TABLETS 2 TIMES     DAILY WITH MEALS, Disp: 360 tablet, Rfl: 2  •  tadalafil (CIALIS) 20 MG tablet, Take 20 mg by mouth Daily As  Needed for erectile dysfunction., Disp: , Rfl:     No problems with medications.  Refills if needed done    No Known Allergies    Review of Systems  GENERAL:  Active/slower with limits, speed, stamina for age; does own yard work. Sleep is ok. No fever now.  ENDO:  No syncope, near or diaphoretic sweaty spells.  BS variable;  no download noted.  HEENT: No head injury or headache.   No vision change.  No significant hearing loss.  Ears without pain/drainage.  No sore throat.  No usual significant nasal/sinus congestion/drainage; worse couple weeks.  No epistaxis.  CHEST: No chest wall tenderness or mass. No usual significant cough, without wheeze.  No SOB; no hemoptysis.  CV: No chest pain, palpitations, ankle edema.  GI: No heartburn, dysphagia.  No abdominal pain, diarrhea, constipation.  No rectal bleeding, or melena.    :  Voids without dysuria, or incontinence to completion.   ORTHO: No painful/swollen joints but various on /off sore.  No  sore neck or back.  No acute neck or back pain without recent injury.   NEURO: No dizziness, weakness of extremities.  No change occ numbness/paresthesias.   Two weeks tics of L cheek.   PSYCH: No memory loss.  Mood good; not anxious, depressed   Screening:  Mammogram: NA  Bone density: NA  Low dose CT chest: Tobacco-smoker/never: NA  GI: Colon-nl/[facility]/6.12.17/10y  Prostate: 7.13.18  Usual lab order  6m CMP, A1c  12m CBC, CMP, A1c, LIPID, TSH, PSAs, B12, folate    Lab Results:  Results for orders placed or performed in visit on 05/23/19   Basic Metabolic Panel   Result Value Ref Range    Glucose 132 (H) 65 - 99 mg/dL    BUN 16 6 - 20 mg/dL    Creatinine 1.28 (H) 0.76 - 1.27 mg/dL    eGFR Non African Am 58 (L) >60 mL/min/1.73    eGFR African Am 70 >60 mL/min/1.73    BUN/Creatinine Ratio 12.5 7.0 - 25.0    Sodium 135 (L) 136 - 145 mmol/L    Potassium 4.9 3.5 - 5.2 mmol/L    Chloride 98 98 - 107 mmol/L    Total CO2 26.8 22.0 - 29.0 mmol/L    Calcium 10.3 8.6 - 10.5 mg/dL  "      A1C:  Lab Results - Last 18 Months   Lab Units 05/15/19  0746 10/31/18  0712 07/11/18  0832   HEMOGLOBIN A1C % 9.30* 8.70 9.70     PSA:  Lab Results - Last 18 Months   Lab Units 07/11/18  0832   PSA ng/mL 0.518     CBC:  Lab Results - Last 18 Months   Lab Units 07/11/18  0832   WBC 10*3/mm3 6.77   HEMOGLOBIN g/dL 16.5   HEMATOCRIT % 49.2   PLATELETS 10*3/mm3 183      BMP/CMP:  Lab Results - Last 18 Months   Lab Units 05/23/19  0724 05/15/19  0746 11/13/18  1209 10/31/18  0712 07/11/18  0832   SODIUM mmol/L 135* 137  --  140 138   POTASSIUM mmol/L 4.9 5.0  --  5.1 4.4   CHLORIDE mmol/L 98 98  --  97* 97*   TOTAL CO2 mmol/L 26.8 25.2  --  31.0 28.0   GLUCOSE mg/dL 132* 114*  --  108* 102*   BUN mg/dL 16 20  --  12 11   CREATININE mg/dL 1.28* 1.41*  --  1.13 1.00   EGFR IF NONAFRICN AM mL/min/1.73 58* 52*  --  67 78   EGFR IF AFRICN AM mL/min/1.73 70 63  --  81 94   CALCIUM mg/dL 10.3 10.4 9.5 11.3* 10.9*     HEPATIC:  Lab Results - Last 18 Months   Lab Units 05/15/19  0746 07/11/18  0832   ALT (SGPT) U/L 20 37   AST (SGOT) U/L 24 34   ALK PHOS U/L 113 85     THYROID:  Lab Results - Last 18 Months   Lab Units 07/11/18  0832   TSH mIU/mL 1.680       Objective   /80   Pulse 104   Temp 98.5 °F (36.9 °C)   Resp 18   Ht 180.3 cm (71\")   Wt 95.3 kg (210 lb)   SpO2 96%   BMI 29.29 kg/m²   Body mass index is 29.29 kg/m².    Physical Exam  GENERAL:  Well nourished/developed in no acute distress. Obese   SKIN: Turgor excellent, without wound, rash, lesion other than PHOTO sole L foot.   HEENT: Normal cephalic without trauma.  Pupils equal round reactive to light. Extraocular motions full without nystagmus.   External canals nonobstructive nontender without reddness. Tymphatic membranes vale with ottoniel structures intact.   Oral cavity without growths, exudates, and moist.  Posterior pharynx without mass, obstruction, redness.  No thyromegaly, mass, tenderness, lymphadenopathy and supple.  CV: Regular rhythm. "  No murmur, gallop,  edema. Posterior pulses intact.  No carotid bruits.  CHEST: No chest wall tenderness or mass.   LUNGS: Symmetric motion with clear to auscultation.   ABD: Soft, nontender without mass.    ORTHO: Symmetric extremities without swelling/point tenderness.  Full gross range of motion.  NEURO: CN 2-12 grossly intact.  Symmetric facies. 1/4 x bicep knee equal reflexes.  UE/LE   3-4/5 strength throughout.  Nonfocal use extremities. Speech clear.  Reduced light touch with monofilament, vibratory sensation with tuning fork; equal toes/distal feet.    PSYCH: Oriented x 3.  Pleasant calm, well kept.  Purposeful/directed conservation with intact short/long gross memory.     PHOTO-L foot  Shaved hyperkeratic area    Assessment/Plan     1. Uncontrolled type 2 diabetes mellitus with hyperglycemia (CMS/[facility])    2. Diabetic foot ulcer associated with other specified diabetes mellitus, unspecified laterality, unspecified part of foot, unspecified ulcer stage (CMS/[facility])    3. Essential hypertension    4. Neuropathy-offered Rx      Rx: reviewed/changes:  No orders of the defined types were placed in this encounter.      LAB/Testing/Referrals: reviewed/orders:   Today:   Orders Placed This Encounter   Procedures   • Ambulatory Referral to Podiatry     Chronic/recurrent labs above or change to:   same     Discussions:   Shaved hyperkeratotic edges down  Pad; avoid pressure on this  Body mass index is 29.29 kg/m².  Patient's Body mass index is 29.29 kg/m². BMI is above normal parameters. Recommendations include: exercise counseling, nutrition counseling and as before.      Tobacco use reviewed:    Non-smoker    There are no Patient Instructions on file for this visit.    Follow up: No Follow-up on file.  Future Appointments   Date Time Provider Department Center   12/4/2019  8:10 AM LAB PC DO [facility] None   12/11/2019  9:00 AM Davi Gandhi MD [facility] None

## 2019-07-26 ENCOUNTER — TELEPHONE (OUTPATIENT)
Dept: FAMILY MEDICINE CLINIC | Facility: CLINIC | Age: 59
End: 2019-07-26

## 2019-07-26 DIAGNOSIS — L97.509 DIABETIC FOOT ULCER ASSOCIATED WITH OTHER SPECIFIED DIABETES MELLITUS, UNSPECIFIED LATERALITY, UNSPECIFIED PART OF FOOT, UNSPECIFIED ULCER STAGE (HCC): Primary | ICD-10-CM

## 2019-07-26 DIAGNOSIS — E13.621 DIABETIC FOOT ULCER ASSOCIATED WITH OTHER SPECIFIED DIABETES MELLITUS, UNSPECIFIED LATERALITY, UNSPECIFIED PART OF FOOT, UNSPECIFIED ULCER STAGE (HCC): Primary | ICD-10-CM

## 2019-07-26 RX ORDER — AMOXICILLIN AND CLAVULANATE POTASSIUM 875; 125 MG/1; MG/1
1 TABLET, FILM COATED ORAL 3 TIMES DAILY
Qty: 30 TABLET | Refills: 0 | Status: SHIPPED | OUTPATIENT
Start: 2019-07-26 | End: 2019-08-22

## 2019-07-26 NOTE — TELEPHONE ENCOUNTER
HAVE PATIENT SCHEDULED WITH WOUND CARE ON Monday AT 1:00PM. KATHE WAS GOING TO CALL PATIENT AND INFORM HIM OF APPT.

## 2019-07-26 NOTE — TELEPHONE ENCOUNTER
"Vm \"I was seen the other day for diabetic ulcer on my foot. I believe it is worse, it is red, swollen, low grade temp. I dont know if I needs antibiotic called in or be seen?\"  "

## 2019-07-26 NOTE — TELEPHONE ENCOUNTER
Call wound care  Tell them this is a podiatry AND wound issue and patient is not having to go on antibiotic and is getting worse  Minimum; needs to be seem by monday

## 2019-07-26 NOTE — TELEPHONE ENCOUNTER
"Spoke to pt and stated \"they haven't called me yet for an appt\"    Timeline has appt with Dr Macdonald 11-11-19  E-rx done to [facility]  "

## 2019-07-29 ENCOUNTER — OFFICE VISIT (OUTPATIENT)
Dept: WOUND CARE | Facility: HOSPITAL | Age: 59
End: 2019-07-29

## 2019-07-29 ENCOUNTER — TRANSCRIBE ORDERS (OUTPATIENT)
Dept: ADMINISTRATIVE | Facility: HOSPITAL | Age: 59
End: 2019-07-29

## 2019-07-29 ENCOUNTER — LAB REQUISITION (OUTPATIENT)
Dept: LAB | Facility: HOSPITAL | Age: 59
End: 2019-07-29

## 2019-07-29 ENCOUNTER — HOSPITAL ENCOUNTER (OUTPATIENT)
Dept: GENERAL RADIOLOGY | Facility: HOSPITAL | Age: 59
Discharge: HOME | End: 2019-07-29
Admitting: NURSE PRACTITIONER

## 2019-07-29 DIAGNOSIS — M86.9 OSTEOMYELITIS, ANKLE AND FOOT (HCC): ICD-10-CM

## 2019-07-29 DIAGNOSIS — Z00.00 ENCOUNTER FOR GENERAL ADULT MEDICAL EXAMINATION WITHOUT ABNORMAL FINDINGS: ICD-10-CM

## 2019-07-29 DIAGNOSIS — M86.9 OSTEOMYELITIS, ANKLE AND FOOT (HCC): Primary | ICD-10-CM

## 2019-07-29 PROCEDURE — 87147 CULTURE TYPE IMMUNOLOGIC: CPT | Performed by: NURSE PRACTITIONER

## 2019-07-29 PROCEDURE — 87205 SMEAR GRAM STAIN: CPT | Performed by: NURSE PRACTITIONER

## 2019-07-29 PROCEDURE — 87075 CULTR BACTERIA EXCEPT BLOOD: CPT | Performed by: NURSE PRACTITIONER

## 2019-07-29 PROCEDURE — 87070 CULTURE OTHR SPECIMN AEROBIC: CPT | Performed by: NURSE PRACTITIONER

## 2019-07-29 PROCEDURE — 87077 CULTURE AEROBIC IDENTIFY: CPT | Performed by: NURSE PRACTITIONER

## 2019-07-29 PROCEDURE — 87176 TISSUE HOMOGENIZATION CULTR: CPT | Performed by: NURSE PRACTITIONER

## 2019-07-29 PROCEDURE — 73630 X-RAY EXAM OF FOOT: CPT

## 2019-08-01 LAB
BACTERIA SPEC AEROBE CULT: ABNORMAL
GRAM STN SPEC: ABNORMAL

## 2019-08-03 LAB — BACTERIA SPEC ANAEROBE CULT: NORMAL

## 2019-08-07 ENCOUNTER — OFFICE VISIT (OUTPATIENT)
Dept: WOUND CARE | Facility: HOSPITAL | Age: 59
End: 2019-08-07

## 2019-08-07 ENCOUNTER — LAB REQUISITION (OUTPATIENT)
Dept: LAB | Facility: HOSPITAL | Age: 59
End: 2019-08-07

## 2019-08-07 DIAGNOSIS — Z00.00 ENCOUNTER FOR GENERAL ADULT MEDICAL EXAMINATION WITHOUT ABNORMAL FINDINGS: ICD-10-CM

## 2019-08-07 PROCEDURE — 87147 CULTURE TYPE IMMUNOLOGIC: CPT | Performed by: NURSE PRACTITIONER

## 2019-08-07 PROCEDURE — 87186 SC STD MICRODIL/AGAR DIL: CPT | Performed by: NURSE PRACTITIONER

## 2019-08-07 PROCEDURE — 87176 TISSUE HOMOGENIZATION CULTR: CPT | Performed by: NURSE PRACTITIONER

## 2019-08-07 PROCEDURE — 87205 SMEAR GRAM STAIN: CPT | Performed by: NURSE PRACTITIONER

## 2019-08-07 PROCEDURE — 87075 CULTR BACTERIA EXCEPT BLOOD: CPT | Performed by: NURSE PRACTITIONER

## 2019-08-07 PROCEDURE — 87185 SC STD ENZYME DETCJ PER NZM: CPT | Performed by: NURSE PRACTITIONER

## 2019-08-07 PROCEDURE — 87077 CULTURE AEROBIC IDENTIFY: CPT | Performed by: NURSE PRACTITIONER

## 2019-08-07 PROCEDURE — 87070 CULTURE OTHR SPECIMN AEROBIC: CPT | Performed by: NURSE PRACTITIONER

## 2019-08-10 LAB
B-LACTAMASE USUAL SUSC ISLT: POSITIVE
BACTERIA SPEC AEROBE CULT: ABNORMAL
BACTERIA SPEC AEROBE CULT: ABNORMAL
GRAM STN SPEC: ABNORMAL

## 2019-08-12 LAB — BACTERIA SPEC ANAEROBE CULT: NORMAL

## 2019-08-14 ENCOUNTER — OFFICE VISIT (OUTPATIENT)
Dept: WOUND CARE | Facility: HOSPITAL | Age: 59
End: 2019-08-14

## 2019-08-16 ENCOUNTER — TRANSCRIBE ORDERS (OUTPATIENT)
Dept: ADMINISTRATIVE | Facility: HOSPITAL | Age: 59
End: 2019-08-16

## 2019-08-16 DIAGNOSIS — M86.9 OSTEOMYELITIS, UNSPECIFIED SITE, UNSPECIFIED TYPE (HCC): Primary | ICD-10-CM

## 2019-08-20 ENCOUNTER — HOSPITAL ENCOUNTER (OUTPATIENT)
Dept: MRI IMAGING | Facility: HOSPITAL | Age: 59
Discharge: HOME | End: 2019-08-20
Admitting: NURSE PRACTITIONER

## 2019-08-20 DIAGNOSIS — E11.9 CONTROLLED TYPE 2 DIABETES MELLITUS WITHOUT COMPLICATION, WITHOUT LONG-TERM CURRENT USE OF INSULIN (HCC): Chronic | ICD-10-CM

## 2019-08-20 PROCEDURE — 73718 MRI LOWER EXTREMITY W/O DYE: CPT

## 2019-08-22 ENCOUNTER — LAB REQUISITION (OUTPATIENT)
Dept: LAB | Facility: HOSPITAL | Age: 59
End: 2019-08-22

## 2019-08-22 ENCOUNTER — OFFICE VISIT (OUTPATIENT)
Dept: WOUND CARE | Facility: HOSPITAL | Age: 59
End: 2019-08-22

## 2019-08-22 DIAGNOSIS — Z00.00 ENCOUNTER FOR GENERAL ADULT MEDICAL EXAMINATION WITHOUT ABNORMAL FINDINGS: ICD-10-CM

## 2019-08-22 DIAGNOSIS — I10 ESSENTIAL HYPERTENSION: Chronic | ICD-10-CM

## 2019-08-22 PROCEDURE — 87075 CULTR BACTERIA EXCEPT BLOOD: CPT | Performed by: PODIATRIST

## 2019-08-22 PROCEDURE — 87176 TISSUE HOMOGENIZATION CULTR: CPT | Performed by: PODIATRIST

## 2019-08-22 PROCEDURE — 87070 CULTURE OTHR SPECIMN AEROBIC: CPT | Performed by: PODIATRIST

## 2019-08-22 PROCEDURE — 87205 SMEAR GRAM STAIN: CPT | Performed by: PODIATRIST

## 2019-08-22 RX ORDER — LISINOPRIL 10 MG/1
10 TABLET ORAL DAILY
Qty: 90 TABLET | Refills: 3 | Status: SHIPPED | OUTPATIENT
Start: 2019-08-22 | End: 2019-11-18 | Stop reason: SDUPTHER

## 2019-08-25 LAB
BACTERIA SPEC AEROBE CULT: ABNORMAL
GRAM STN SPEC: ABNORMAL
GRAM STN SPEC: ABNORMAL

## 2019-08-27 LAB — BACTERIA SPEC ANAEROBE CULT: NORMAL

## 2019-08-28 ENCOUNTER — LAB REQUISITION (OUTPATIENT)
Dept: LAB | Facility: HOSPITAL | Age: 59
End: 2019-08-28

## 2019-08-28 ENCOUNTER — OFFICE VISIT (OUTPATIENT)
Dept: WOUND CARE | Facility: HOSPITAL | Age: 59
End: 2019-08-28

## 2019-08-28 DIAGNOSIS — Z00.00 ENCOUNTER FOR GENERAL ADULT MEDICAL EXAMINATION WITHOUT ABNORMAL FINDINGS: ICD-10-CM

## 2019-08-28 PROCEDURE — 87176 TISSUE HOMOGENIZATION CULTR: CPT | Performed by: NURSE PRACTITIONER

## 2019-08-28 PROCEDURE — 87186 SC STD MICRODIL/AGAR DIL: CPT | Performed by: NURSE PRACTITIONER

## 2019-08-28 PROCEDURE — 87075 CULTR BACTERIA EXCEPT BLOOD: CPT | Performed by: NURSE PRACTITIONER

## 2019-08-28 PROCEDURE — 87185 SC STD ENZYME DETCJ PER NZM: CPT | Performed by: NURSE PRACTITIONER

## 2019-08-28 PROCEDURE — 87205 SMEAR GRAM STAIN: CPT | Performed by: NURSE PRACTITIONER

## 2019-08-28 PROCEDURE — 87070 CULTURE OTHR SPECIMN AEROBIC: CPT | Performed by: NURSE PRACTITIONER

## 2019-08-28 PROCEDURE — 87077 CULTURE AEROBIC IDENTIFY: CPT | Performed by: NURSE PRACTITIONER

## 2019-08-28 PROCEDURE — 87147 CULTURE TYPE IMMUNOLOGIC: CPT | Performed by: NURSE PRACTITIONER

## 2019-08-30 ENCOUNTER — TRANSCRIBE ORDERS (OUTPATIENT)
Dept: ADMINISTRATIVE | Facility: HOSPITAL | Age: 59
End: 2019-08-30

## 2019-08-31 LAB
BACTERIA SPEC AEROBE CULT: ABNORMAL
BACTERIA SPEC AEROBE CULT: ABNORMAL
GRAM STN SPEC: ABNORMAL
GRAM STN SPEC: ABNORMAL

## 2019-09-02 LAB — BACTERIA SPEC ANAEROBE CULT: NORMAL

## 2019-09-03 PROBLEM — E11.628 DIABETIC FOOT INFECTION (HCC): Status: ACTIVE | Noted: 2019-09-03

## 2019-09-03 PROBLEM — L08.9 DIABETIC FOOT INFECTION (HCC): Status: ACTIVE | Noted: 2019-09-03

## 2019-09-03 RX ORDER — DEXTROSE MONOHYDRATE 50 MG/ML
250 INJECTION, SOLUTION INTRAVENOUS ONCE
Status: CANCELLED
Start: 2019-09-06 | End: 2019-09-06

## 2019-09-05 ENCOUNTER — OFFICE VISIT (OUTPATIENT)
Dept: WOUND CARE | Facility: HOSPITAL | Age: 59
End: 2019-09-05

## 2019-09-05 ENCOUNTER — HOSPITAL ENCOUNTER (OUTPATIENT)
Dept: GENERAL RADIOLOGY | Facility: HOSPITAL | Age: 59
Discharge: HOME | End: 2019-09-05
Admitting: PODIATRIST

## 2019-09-05 ENCOUNTER — INFUSION (OUTPATIENT)
Dept: ONCOLOGY | Facility: HOSPITAL | Age: 59
End: 2019-09-05

## 2019-09-05 ENCOUNTER — TRANSCRIBE ORDERS (OUTPATIENT)
Dept: ADMINISTRATIVE | Facility: HOSPITAL | Age: 59
End: 2019-09-05

## 2019-09-05 VITALS
HEART RATE: 98 BPM | OXYGEN SATURATION: 97 % | SYSTOLIC BLOOD PRESSURE: 83 MMHG | RESPIRATION RATE: 18 BRPM | HEIGHT: 71 IN | DIASTOLIC BLOOD PRESSURE: 66 MMHG | WEIGHT: 199 LBS | TEMPERATURE: 98 F | BODY MASS INDEX: 27.86 KG/M2

## 2019-09-05 DIAGNOSIS — L08.9 DIABETIC FOOT INFECTION (HCC): Primary | ICD-10-CM

## 2019-09-05 DIAGNOSIS — M86.9 OSTEITIS (HCC): Primary | ICD-10-CM

## 2019-09-05 DIAGNOSIS — E11.628 DIABETIC FOOT INFECTION (HCC): Primary | ICD-10-CM

## 2019-09-05 PROCEDURE — 87176 TISSUE HOMOGENIZATION CULTR: CPT | Performed by: PODIATRIST

## 2019-09-05 PROCEDURE — 25010000002 DALBAVANCIN 500 MG RECONSTITUTED SOLUTION 1 EACH VIAL: Performed by: PODIATRIST

## 2019-09-05 PROCEDURE — 87077 CULTURE AEROBIC IDENTIFY: CPT | Performed by: PODIATRIST

## 2019-09-05 PROCEDURE — 73630 X-RAY EXAM OF FOOT: CPT

## 2019-09-05 PROCEDURE — 87186 SC STD MICRODIL/AGAR DIL: CPT | Performed by: PODIATRIST

## 2019-09-05 PROCEDURE — 87205 SMEAR GRAM STAIN: CPT | Performed by: PODIATRIST

## 2019-09-05 PROCEDURE — 88307 TISSUE EXAM BY PATHOLOGIST: CPT | Performed by: PODIATRIST

## 2019-09-05 PROCEDURE — 87070 CULTURE OTHR SPECIMN AEROBIC: CPT | Performed by: PODIATRIST

## 2019-09-05 PROCEDURE — 88311 DECALCIFY TISSUE: CPT | Performed by: PODIATRIST

## 2019-09-05 PROCEDURE — 96365 THER/PROPH/DIAG IV INF INIT: CPT

## 2019-09-05 PROCEDURE — 87075 CULTR BACTERIA EXCEPT BLOOD: CPT | Performed by: PODIATRIST

## 2019-09-05 RX ORDER — DEXTROSE MONOHYDRATE 50 MG/ML
250 INJECTION, SOLUTION INTRAVENOUS ONCE
Status: COMPLETED | OUTPATIENT
Start: 2019-09-05 | End: 2019-09-05

## 2019-09-05 RX ORDER — DEXTROSE MONOHYDRATE 50 MG/ML
250 INJECTION, SOLUTION INTRAVENOUS ONCE
Status: CANCELLED
Start: 2019-09-05 | End: 2019-09-05

## 2019-09-05 RX ADMIN — DALBAVANCIN 1500 MG: 500 INJECTION, POWDER, FOR SOLUTION INTRAVENOUS at 12:24

## 2019-09-05 RX ADMIN — DEXTROSE MONOHYDRATE 250 ML: 50 INJECTION, SOLUTION INTRAVENOUS at 12:20

## 2019-09-06 ENCOUNTER — LAB REQUISITION (OUTPATIENT)
Dept: LAB | Facility: HOSPITAL | Age: 59
End: 2019-09-06

## 2019-09-06 ENCOUNTER — APPOINTMENT (OUTPATIENT)
Dept: ONCOLOGY | Facility: HOSPITAL | Age: 59
End: 2019-09-06

## 2019-09-06 DIAGNOSIS — Z00.00 ENCOUNTER FOR GENERAL ADULT MEDICAL EXAMINATION WITHOUT ABNORMAL FINDINGS: ICD-10-CM

## 2019-09-09 LAB
CYTO UR: NORMAL
LAB AP CASE REPORT: NORMAL
LAB AP CLINICAL INFORMATION: NORMAL
PATH REPORT.FINAL DX SPEC: NORMAL
PATH REPORT.GROSS SPEC: NORMAL

## 2019-09-10 LAB
BACTERIA SPEC AEROBE CULT: ABNORMAL
BACTERIA SPEC AEROBE CULT: ABNORMAL
GRAM STN SPEC: ABNORMAL

## 2019-09-11 LAB — BACTERIA SPEC ANAEROBE CULT: NORMAL

## 2019-09-12 ENCOUNTER — OFFICE VISIT (OUTPATIENT)
Dept: WOUND CARE | Facility: HOSPITAL | Age: 59
End: 2019-09-12

## 2019-09-13 ENCOUNTER — TRANSCRIBE ORDERS (OUTPATIENT)
Dept: ADMINISTRATIVE | Facility: HOSPITAL | Age: 59
End: 2019-09-13

## 2019-09-13 DIAGNOSIS — M86.00 ACUTE HEMATOGENOUS OSTEOMYELITIS, UNSPECIFIED SITE (HCC): ICD-10-CM

## 2019-09-13 DIAGNOSIS — M86.172 ACUTE OSTEOMYELITIS OF LEFT ANKLE OR FOOT (HCC): Primary | ICD-10-CM

## 2019-09-16 ENCOUNTER — HOSPITAL ENCOUNTER (OUTPATIENT)
Dept: OTHER | Facility: HOSPITAL | Age: 59
Setting detail: HOSPITAL OUTPATIENT SURGERY
Discharge: HOME | End: 2019-09-16
Admitting: PODIATRIST

## 2019-09-16 ENCOUNTER — APPOINTMENT (OUTPATIENT)
Dept: INTERVENTIONAL RADIOLOGY/VASCULAR | Facility: HOSPITAL | Age: 59
End: 2019-09-16

## 2019-09-16 ENCOUNTER — INFUSION (OUTPATIENT)
Dept: ONCOLOGY | Facility: HOSPITAL | Age: 59
End: 2019-09-16

## 2019-09-16 VITALS
RESPIRATION RATE: 16 BRPM | TEMPERATURE: 98 F | HEART RATE: 92 BPM | DIASTOLIC BLOOD PRESSURE: 67 MMHG | OXYGEN SATURATION: 100 % | SYSTOLIC BLOOD PRESSURE: 124 MMHG

## 2019-09-16 DIAGNOSIS — L08.9 DIABETIC FOOT INFECTION (HCC): Primary | ICD-10-CM

## 2019-09-16 DIAGNOSIS — E11.628 DIABETIC FOOT INFECTION (HCC): Primary | ICD-10-CM

## 2019-09-16 DIAGNOSIS — M86.00 ACUTE HEMATOGENOUS OSTEOMYELITIS, UNSPECIFIED SITE (HCC): ICD-10-CM

## 2019-09-16 PROCEDURE — 25010000002 VANCOMYCIN PER 500 MG: Performed by: PODIATRIST

## 2019-09-16 PROCEDURE — 96365 THER/PROPH/DIAG IV INF INIT: CPT

## 2019-09-16 PROCEDURE — C1751 CATH, INF, PER/CENT/MIDLINE: HCPCS

## 2019-09-16 RX ORDER — LIDOCAINE HYDROCHLORIDE 10 MG/ML
1 INJECTION, SOLUTION EPIDURAL; INFILTRATION; INTRACAUDAL; PERINEURAL ONCE
Status: COMPLETED | OUTPATIENT
Start: 2019-09-16 | End: 2019-09-16

## 2019-09-16 RX ORDER — VANCOMYCIN HYDROCHLORIDE 1 G/200ML
1000 INJECTION, SOLUTION INTRAVENOUS ONCE
Status: COMPLETED | OUTPATIENT
Start: 2019-09-16 | End: 2019-09-16

## 2019-09-16 RX ADMIN — VANCOMYCIN HYDROCHLORIDE 1000 MG: 1 INJECTION, SOLUTION INTRAVENOUS at 12:43

## 2019-09-16 RX ADMIN — LIDOCAINE HYDROCHLORIDE 1 ML: 10 INJECTION, SOLUTION EPIDURAL; INFILTRATION; INTRACAUDAL; PERINEURAL at 11:30

## 2019-09-16 NOTE — CONSULTS
Pt had 4 [place] single lumen PICC placed in left basilic vein. Pt tolerated procedure well. 44 cm of catheter internal and 0 cm external. Tip confirmation by 3cg. All lumens flush and draw well. Sterile dressing applied.

## 2019-09-17 ENCOUNTER — OFFICE VISIT (OUTPATIENT)
Dept: WOUND CARE | Facility: HOSPITAL | Age: 59
End: 2019-09-17

## 2019-09-17 PROCEDURE — G0277 HBOT, FULL BODY CHAMBER, 30M: HCPCS

## 2019-09-18 ENCOUNTER — OFFICE VISIT (OUTPATIENT)
Dept: WOUND CARE | Facility: HOSPITAL | Age: 59
End: 2019-09-18

## 2019-09-18 PROCEDURE — G0277 HBOT, FULL BODY CHAMBER, 30M: HCPCS

## 2019-09-19 ENCOUNTER — LAB (OUTPATIENT)
Dept: LAB | Facility: HOSPITAL | Age: 59
End: 2019-09-19

## 2019-09-19 ENCOUNTER — TRANSCRIBE ORDERS (OUTPATIENT)
Dept: ADMINISTRATIVE | Facility: HOSPITAL | Age: 59
End: 2019-09-19

## 2019-09-19 ENCOUNTER — OFFICE VISIT (OUTPATIENT)
Dept: WOUND CARE | Facility: HOSPITAL | Age: 59
End: 2019-09-19

## 2019-09-19 DIAGNOSIS — M86.10 OSTEOMYELITIS, ACUTE (HCC): Primary | ICD-10-CM

## 2019-09-19 DIAGNOSIS — E11.69 DIABETIC FOOT ULCER WITH OSTEOMYELITIS (HCC): Primary | ICD-10-CM

## 2019-09-19 DIAGNOSIS — L97.509 DIABETIC FOOT ULCER WITH OSTEOMYELITIS (HCC): Primary | ICD-10-CM

## 2019-09-19 DIAGNOSIS — E11.621 DIABETIC FOOT ULCER WITH OSTEOMYELITIS (HCC): Primary | ICD-10-CM

## 2019-09-19 DIAGNOSIS — M86.10 OSTEOMYELITIS, ACUTE (HCC): ICD-10-CM

## 2019-09-19 DIAGNOSIS — M86.9 DIABETIC FOOT ULCER WITH OSTEOMYELITIS (HCC): Primary | ICD-10-CM

## 2019-09-19 LAB
ALBUMIN SERPL-MCNC: 3.9 G/DL (ref 3.5–5.2)
ALBUMIN/GLOB SERPL: 1 G/DL
ALP SERPL-CCNC: 104 U/L (ref 39–117)
ALT SERPL W P-5'-P-CCNC: 12 U/L (ref 1–41)
ANION GAP SERPL CALCULATED.3IONS-SCNC: 11 MMOL/L (ref 5–15)
AST SERPL-CCNC: 15 U/L (ref 1–40)
BASOPHILS # BLD AUTO: 0.01 10*3/MM3 (ref 0–0.2)
BASOPHILS NFR BLD AUTO: 0.2 % (ref 0–1.5)
BILIRUB SERPL-MCNC: 0.4 MG/DL (ref 0.2–1.2)
BUN BLD-MCNC: 22 MG/DL (ref 6–20)
BUN/CREAT SERPL: 17.7 (ref 7–25)
CALCIUM SPEC-SCNC: 9.5 MG/DL (ref 8.6–10.5)
CHLORIDE SERPL-SCNC: 100 MMOL/L (ref 98–107)
CO2 SERPL-SCNC: 27 MMOL/L (ref 22–29)
CREAT BLD-MCNC: 1.24 MG/DL (ref 0.76–1.27)
CRP SERPL-MCNC: 4.87 MG/DL (ref 0–0.5)
DEPRECATED RDW RBC AUTO: 37.1 FL (ref 37–54)
EOSINOPHIL # BLD AUTO: 0.11 10*3/MM3 (ref 0–0.4)
EOSINOPHIL NFR BLD AUTO: 2.2 % (ref 0.3–6.2)
ERYTHROCYTE [DISTWIDTH] IN BLOOD BY AUTOMATED COUNT: 12.2 % (ref 12.3–15.4)
ERYTHROCYTE [SEDIMENTATION RATE] IN BLOOD: 5 MM/HR (ref 0–15)
GFR SERPL CREATININE-BSD FRML MDRD: 60 ML/MIN/1.73
GLOBULIN UR ELPH-MCNC: 4.1 GM/DL
GLUCOSE BLD-MCNC: 136 MG/DL (ref 65–99)
HCT VFR BLD AUTO: 40.3 % (ref 37.5–51)
HGB BLD-MCNC: 14.1 G/DL (ref 13–17.7)
IMM GRANULOCYTES # BLD AUTO: 0.03 10*3/MM3 (ref 0–0.05)
IMM GRANULOCYTES NFR BLD AUTO: 0.6 % (ref 0–0.5)
LYMPHOCYTES # BLD AUTO: 1.37 10*3/MM3 (ref 0.7–3.1)
LYMPHOCYTES NFR BLD AUTO: 26.9 % (ref 19.6–45.3)
MCH RBC QN AUTO: 29.6 PG (ref 26.6–33)
MCHC RBC AUTO-ENTMCNC: 35 G/DL (ref 31.5–35.7)
MCV RBC AUTO: 84.7 FL (ref 79–97)
MONOCYTES # BLD AUTO: 0.75 10*3/MM3 (ref 0.1–0.9)
MONOCYTES NFR BLD AUTO: 14.7 % (ref 5–12)
NEUTROPHILS # BLD AUTO: 2.83 10*3/MM3 (ref 1.7–7)
NEUTROPHILS NFR BLD AUTO: 55.4 % (ref 42.7–76)
NRBC BLD AUTO-RTO: 0 /100 WBC (ref 0–0.2)
PLATELET # BLD AUTO: 239 10*3/MM3 (ref 140–450)
PMV BLD AUTO: 9 FL (ref 6–12)
POTASSIUM BLD-SCNC: 4.7 MMOL/L (ref 3.5–5.2)
PROT SERPL-MCNC: 8 G/DL (ref 6–8.5)
RBC # BLD AUTO: 4.76 10*6/MM3 (ref 4.14–5.8)
SODIUM BLD-SCNC: 138 MMOL/L (ref 136–145)
VANCOMYCIN TROUGH SERPL-MCNC: 9.3 MCG/ML (ref 5–20)
WBC NRBC COR # BLD: 5.1 10*3/MM3 (ref 3.4–10.8)

## 2019-09-19 PROCEDURE — 80053 COMPREHEN METABOLIC PANEL: CPT

## 2019-09-19 PROCEDURE — 85651 RBC SED RATE NONAUTOMATED: CPT

## 2019-09-19 PROCEDURE — 86140 C-REACTIVE PROTEIN: CPT

## 2019-09-19 PROCEDURE — 85025 COMPLETE CBC W/AUTO DIFF WBC: CPT

## 2019-09-19 PROCEDURE — 36415 COLL VENOUS BLD VENIPUNCTURE: CPT

## 2019-09-19 PROCEDURE — 80202 ASSAY OF VANCOMYCIN: CPT

## 2019-09-19 PROCEDURE — G0463 HOSPITAL OUTPT CLINIC VISIT: HCPCS

## 2019-09-20 ENCOUNTER — OFFICE VISIT (OUTPATIENT)
Dept: WOUND CARE | Facility: HOSPITAL | Age: 59
End: 2019-09-20

## 2019-09-20 PROCEDURE — G0277 HBOT, FULL BODY CHAMBER, 30M: HCPCS

## 2019-09-23 ENCOUNTER — OFFICE VISIT (OUTPATIENT)
Dept: WOUND CARE | Facility: HOSPITAL | Age: 59
End: 2019-09-23

## 2019-09-23 ENCOUNTER — INFUSION (OUTPATIENT)
Dept: ONCOLOGY | Facility: HOSPITAL | Age: 59
End: 2019-09-23

## 2019-09-23 ENCOUNTER — LAB (OUTPATIENT)
Dept: LAB | Facility: HOSPITAL | Age: 59
End: 2019-09-23

## 2019-09-23 VITALS
OXYGEN SATURATION: 100 % | DIASTOLIC BLOOD PRESSURE: 68 MMHG | SYSTOLIC BLOOD PRESSURE: 111 MMHG | RESPIRATION RATE: 16 BRPM | WEIGHT: 197 LBS | HEART RATE: 85 BPM | HEIGHT: 71 IN | TEMPERATURE: 98.3 F | BODY MASS INDEX: 27.58 KG/M2

## 2019-09-23 DIAGNOSIS — L08.9 DIABETIC FOOT INFECTION (HCC): ICD-10-CM

## 2019-09-23 DIAGNOSIS — L97.509 DIABETIC FOOT ULCER WITH OSTEOMYELITIS (HCC): ICD-10-CM

## 2019-09-23 DIAGNOSIS — E11.621 DIABETIC FOOT ULCER WITH OSTEOMYELITIS (HCC): ICD-10-CM

## 2019-09-23 DIAGNOSIS — E11.628 DIABETIC FOOT INFECTION (HCC): ICD-10-CM

## 2019-09-23 DIAGNOSIS — M86.9 DIABETIC FOOT ULCER WITH OSTEOMYELITIS (HCC): ICD-10-CM

## 2019-09-23 DIAGNOSIS — E11.69 DIABETIC FOOT ULCER WITH OSTEOMYELITIS (HCC): ICD-10-CM

## 2019-09-23 LAB
ALBUMIN SERPL-MCNC: 3.5 G/DL (ref 3.5–5.2)
ALBUMIN/GLOB SERPL: 0.9 G/DL
ALP SERPL-CCNC: 100 U/L (ref 39–117)
ALT SERPL W P-5'-P-CCNC: 15 U/L (ref 1–41)
ANION GAP SERPL CALCULATED.3IONS-SCNC: 8 MMOL/L (ref 5–15)
AST SERPL-CCNC: 20 U/L (ref 1–40)
BASOPHILS # BLD AUTO: 0.02 10*3/MM3 (ref 0–0.2)
BASOPHILS NFR BLD AUTO: 0.6 % (ref 0–1.5)
BILIRUB SERPL-MCNC: 0.3 MG/DL (ref 0.2–1.2)
BUN BLD-MCNC: 12 MG/DL (ref 6–20)
BUN/CREAT SERPL: 10.9 (ref 7–25)
CALCIUM SPEC-SCNC: 8.8 MG/DL (ref 8.6–10.5)
CHLORIDE SERPL-SCNC: 103 MMOL/L (ref 98–107)
CO2 SERPL-SCNC: 27 MMOL/L (ref 22–29)
CREAT BLD-MCNC: 1.1 MG/DL (ref 0.76–1.27)
DEPRECATED RDW RBC AUTO: 37.6 FL (ref 37–54)
EOSINOPHIL # BLD AUTO: 0.1 10*3/MM3 (ref 0–0.4)
EOSINOPHIL NFR BLD AUTO: 2.9 % (ref 0.3–6.2)
ERYTHROCYTE [DISTWIDTH] IN BLOOD BY AUTOMATED COUNT: 12.3 % (ref 12.3–15.4)
ERYTHROCYTE [SEDIMENTATION RATE] IN BLOOD: 18 MM/HR (ref 0–15)
GFR SERPL CREATININE-BSD FRML MDRD: 69 ML/MIN/1.73
GLOBULIN UR ELPH-MCNC: 4 GM/DL
GLUCOSE BLD-MCNC: 258 MG/DL (ref 65–99)
HBA1C MFR BLD: 7.6 % (ref 4.8–5.6)
HCT VFR BLD AUTO: 39.6 % (ref 37.5–51)
HGB BLD-MCNC: 13.6 G/DL (ref 13–17.7)
IMM GRANULOCYTES # BLD AUTO: 0.02 10*3/MM3 (ref 0–0.05)
IMM GRANULOCYTES NFR BLD AUTO: 0.6 % (ref 0–0.5)
LYMPHOCYTES # BLD AUTO: 0.84 10*3/MM3 (ref 0.7–3.1)
LYMPHOCYTES NFR BLD AUTO: 24.1 % (ref 19.6–45.3)
MCH RBC QN AUTO: 29.2 PG (ref 26.6–33)
MCHC RBC AUTO-ENTMCNC: 34.3 G/DL (ref 31.5–35.7)
MCV RBC AUTO: 85.2 FL (ref 79–97)
MONOCYTES # BLD AUTO: 0.4 10*3/MM3 (ref 0.1–0.9)
MONOCYTES NFR BLD AUTO: 11.5 % (ref 5–12)
NEUTROPHILS # BLD AUTO: 2.11 10*3/MM3 (ref 1.7–7)
NEUTROPHILS NFR BLD AUTO: 60.3 % (ref 42.7–76)
NRBC BLD AUTO-RTO: 0 /100 WBC (ref 0–0.2)
PLATELET # BLD AUTO: 222 10*3/MM3 (ref 140–450)
PMV BLD AUTO: 8.9 FL (ref 6–12)
POTASSIUM BLD-SCNC: 4.2 MMOL/L (ref 3.5–5.2)
PREALB SERPL-MCNC: 14.7 MG/DL (ref 20–40)
PROT SERPL-MCNC: 7.5 G/DL (ref 6–8.5)
RBC # BLD AUTO: 4.65 10*6/MM3 (ref 4.14–5.8)
SODIUM BLD-SCNC: 138 MMOL/L (ref 136–145)
VANCOMYCIN TROUGH SERPL-MCNC: 12.4 MCG/ML (ref 5–20)
WBC NRBC COR # BLD: 3.49 10*3/MM3 (ref 3.4–10.8)

## 2019-09-23 PROCEDURE — G0277 HBOT, FULL BODY CHAMBER, 30M: HCPCS

## 2019-09-23 PROCEDURE — 80053 COMPREHEN METABOLIC PANEL: CPT

## 2019-09-23 PROCEDURE — 36415 COLL VENOUS BLD VENIPUNCTURE: CPT

## 2019-09-23 PROCEDURE — 83036 HEMOGLOBIN GLYCOSYLATED A1C: CPT

## 2019-09-23 PROCEDURE — G0463 HOSPITAL OUTPT CLINIC VISIT: HCPCS

## 2019-09-23 PROCEDURE — 84134 ASSAY OF PREALBUMIN: CPT

## 2019-09-23 PROCEDURE — 80202 ASSAY OF VANCOMYCIN: CPT

## 2019-09-23 PROCEDURE — 85651 RBC SED RATE NONAUTOMATED: CPT

## 2019-09-23 PROCEDURE — 85025 COMPLETE CBC W/AUTO DIFF WBC: CPT

## 2019-09-24 ENCOUNTER — OFFICE VISIT (OUTPATIENT)
Dept: WOUND CARE | Facility: HOSPITAL | Age: 59
End: 2019-09-24

## 2019-09-24 PROCEDURE — G0277 HBOT, FULL BODY CHAMBER, 30M: HCPCS

## 2019-09-25 ENCOUNTER — OFFICE VISIT (OUTPATIENT)
Dept: WOUND CARE | Facility: HOSPITAL | Age: 59
End: 2019-09-25

## 2019-09-25 PROCEDURE — G0277 HBOT, FULL BODY CHAMBER, 30M: HCPCS

## 2019-09-26 ENCOUNTER — OFFICE VISIT (OUTPATIENT)
Dept: WOUND CARE | Facility: HOSPITAL | Age: 59
End: 2019-09-26

## 2019-09-26 PROCEDURE — G0277 HBOT, FULL BODY CHAMBER, 30M: HCPCS

## 2019-09-27 ENCOUNTER — OFFICE VISIT (OUTPATIENT)
Dept: WOUND CARE | Facility: HOSPITAL | Age: 59
End: 2019-09-27

## 2019-09-27 PROCEDURE — G0277 HBOT, FULL BODY CHAMBER, 30M: HCPCS

## 2019-09-30 ENCOUNTER — LAB (OUTPATIENT)
Dept: LAB | Facility: HOSPITAL | Age: 59
End: 2019-09-30

## 2019-09-30 ENCOUNTER — OFFICE VISIT (OUTPATIENT)
Dept: WOUND CARE | Facility: HOSPITAL | Age: 59
End: 2019-09-30

## 2019-09-30 ENCOUNTER — INFUSION (OUTPATIENT)
Dept: ONCOLOGY | Facility: HOSPITAL | Age: 59
End: 2019-09-30

## 2019-09-30 VITALS
BODY MASS INDEX: 33.29 KG/M2 | WEIGHT: 195 LBS | HEART RATE: 87 BPM | SYSTOLIC BLOOD PRESSURE: 112 MMHG | HEIGHT: 64 IN | TEMPERATURE: 97.9 F | DIASTOLIC BLOOD PRESSURE: 70 MMHG | OXYGEN SATURATION: 100 %

## 2019-09-30 DIAGNOSIS — E11.621 DIABETIC FOOT ULCER WITH OSTEOMYELITIS (HCC): ICD-10-CM

## 2019-09-30 DIAGNOSIS — M86.9 DIABETIC FOOT ULCER WITH OSTEOMYELITIS (HCC): ICD-10-CM

## 2019-09-30 DIAGNOSIS — L97.509 DIABETIC FOOT ULCER WITH OSTEOMYELITIS (HCC): ICD-10-CM

## 2019-09-30 DIAGNOSIS — E11.69 DIABETIC FOOT ULCER WITH OSTEOMYELITIS (HCC): ICD-10-CM

## 2019-09-30 DIAGNOSIS — L03.314 CELLULITIS OF GROIN: ICD-10-CM

## 2019-09-30 LAB
ALBUMIN SERPL-MCNC: 3.7 G/DL (ref 3.5–5.2)
ALBUMIN/GLOB SERPL: 0.9 G/DL
ALP SERPL-CCNC: 111 U/L (ref 39–117)
ALT SERPL W P-5'-P-CCNC: 16 U/L (ref 1–41)
ANION GAP SERPL CALCULATED.3IONS-SCNC: 11 MMOL/L (ref 5–15)
AST SERPL-CCNC: 25 U/L (ref 1–40)
BASOPHILS # BLD AUTO: 0.02 10*3/MM3 (ref 0–0.2)
BASOPHILS NFR BLD AUTO: 0.4 % (ref 0–1.5)
BILIRUB SERPL-MCNC: 0.4 MG/DL (ref 0.2–1.2)
BUN BLD-MCNC: 17 MG/DL (ref 6–20)
BUN/CREAT SERPL: 16.7 (ref 7–25)
CALCIUM SPEC-SCNC: 9.5 MG/DL (ref 8.6–10.5)
CHLORIDE SERPL-SCNC: 98 MMOL/L (ref 98–107)
CO2 SERPL-SCNC: 29 MMOL/L (ref 22–29)
CREAT BLD-MCNC: 1.02 MG/DL (ref 0.76–1.27)
DEPRECATED RDW RBC AUTO: 39.5 FL (ref 37–54)
EOSINOPHIL # BLD AUTO: 0.06 10*3/MM3 (ref 0–0.4)
EOSINOPHIL NFR BLD AUTO: 1.2 % (ref 0.3–6.2)
ERYTHROCYTE [DISTWIDTH] IN BLOOD BY AUTOMATED COUNT: 13.1 % (ref 12.3–15.4)
ERYTHROCYTE [SEDIMENTATION RATE] IN BLOOD: 11 MM/HR (ref 0–15)
GFR SERPL CREATININE-BSD FRML MDRD: 75 ML/MIN/1.73
GLOBULIN UR ELPH-MCNC: 4.1 GM/DL
GLUCOSE BLD-MCNC: 242 MG/DL (ref 65–99)
HCT VFR BLD AUTO: 41.3 % (ref 37.5–51)
HGB BLD-MCNC: 14 G/DL (ref 13–17.7)
IMM GRANULOCYTES # BLD AUTO: 0.03 10*3/MM3 (ref 0–0.05)
IMM GRANULOCYTES NFR BLD AUTO: 0.6 % (ref 0–0.5)
LYMPHOCYTES # BLD AUTO: 1.1 10*3/MM3 (ref 0.7–3.1)
LYMPHOCYTES NFR BLD AUTO: 22.9 % (ref 19.6–45.3)
MCH RBC QN AUTO: 28.9 PG (ref 26.6–33)
MCHC RBC AUTO-ENTMCNC: 33.9 G/DL (ref 31.5–35.7)
MCV RBC AUTO: 85.3 FL (ref 79–97)
MONOCYTES # BLD AUTO: 0.55 10*3/MM3 (ref 0.1–0.9)
MONOCYTES NFR BLD AUTO: 11.4 % (ref 5–12)
NEUTROPHILS # BLD AUTO: 3.05 10*3/MM3 (ref 1.7–7)
NEUTROPHILS NFR BLD AUTO: 63.5 % (ref 42.7–76)
NRBC BLD AUTO-RTO: 0 /100 WBC (ref 0–0.2)
PLATELET # BLD AUTO: 204 10*3/MM3 (ref 140–450)
PMV BLD AUTO: 9.1 FL (ref 6–12)
POTASSIUM BLD-SCNC: 4.1 MMOL/L (ref 3.5–5.2)
PREALB SERPL-MCNC: 16.7 MG/DL (ref 20–40)
PROT SERPL-MCNC: 7.8 G/DL (ref 6–8.5)
RBC # BLD AUTO: 4.84 10*6/MM3 (ref 4.14–5.8)
SODIUM BLD-SCNC: 138 MMOL/L (ref 136–145)
VANCOMYCIN TROUGH SERPL-MCNC: 11.9 MCG/ML (ref 5–20)
WBC NRBC COR # BLD: 4.81 10*3/MM3 (ref 3.4–10.8)

## 2019-09-30 PROCEDURE — G0463 HOSPITAL OUTPT CLINIC VISIT: HCPCS

## 2019-09-30 PROCEDURE — 85025 COMPLETE CBC W/AUTO DIFF WBC: CPT

## 2019-09-30 PROCEDURE — 85651 RBC SED RATE NONAUTOMATED: CPT

## 2019-09-30 PROCEDURE — G0277 HBOT, FULL BODY CHAMBER, 30M: HCPCS

## 2019-09-30 PROCEDURE — 36415 COLL VENOUS BLD VENIPUNCTURE: CPT

## 2019-09-30 PROCEDURE — 80202 ASSAY OF VANCOMYCIN: CPT

## 2019-09-30 PROCEDURE — 80053 COMPREHEN METABOLIC PANEL: CPT

## 2019-09-30 PROCEDURE — 84134 ASSAY OF PREALBUMIN: CPT

## 2019-10-01 ENCOUNTER — OFFICE VISIT (OUTPATIENT)
Dept: WOUND CARE | Facility: HOSPITAL | Age: 59
End: 2019-10-01

## 2019-10-01 PROCEDURE — G0277 HBOT, FULL BODY CHAMBER, 30M: HCPCS

## 2019-10-02 ENCOUNTER — OFFICE VISIT (OUTPATIENT)
Dept: WOUND CARE | Facility: HOSPITAL | Age: 59
End: 2019-10-02

## 2019-10-02 PROCEDURE — G0277 HBOT, FULL BODY CHAMBER, 30M: HCPCS

## 2019-10-03 ENCOUNTER — OFFICE VISIT (OUTPATIENT)
Dept: WOUND CARE | Facility: HOSPITAL | Age: 59
End: 2019-10-03

## 2019-10-03 PROCEDURE — G0277 HBOT, FULL BODY CHAMBER, 30M: HCPCS

## 2019-10-04 ENCOUNTER — OFFICE VISIT (OUTPATIENT)
Dept: WOUND CARE | Facility: HOSPITAL | Age: 59
End: 2019-10-04

## 2019-10-04 PROCEDURE — G0277 HBOT, FULL BODY CHAMBER, 30M: HCPCS

## 2019-10-07 ENCOUNTER — OFFICE VISIT (OUTPATIENT)
Dept: WOUND CARE | Facility: HOSPITAL | Age: 59
End: 2019-10-07

## 2019-10-07 ENCOUNTER — INFUSION (OUTPATIENT)
Dept: ONCOLOGY | Facility: HOSPITAL | Age: 59
End: 2019-10-07

## 2019-10-07 ENCOUNTER — LAB (OUTPATIENT)
Dept: LAB | Facility: HOSPITAL | Age: 59
End: 2019-10-07

## 2019-10-07 VITALS
BODY MASS INDEX: 27.44 KG/M2 | SYSTOLIC BLOOD PRESSURE: 96 MMHG | TEMPERATURE: 98.2 F | DIASTOLIC BLOOD PRESSURE: 72 MMHG | WEIGHT: 196 LBS | HEIGHT: 71 IN | OXYGEN SATURATION: 100 % | HEART RATE: 97 BPM

## 2019-10-07 DIAGNOSIS — L97.509 DIABETIC FOOT ULCER WITH OSTEOMYELITIS (HCC): ICD-10-CM

## 2019-10-07 DIAGNOSIS — M86.9 DIABETIC FOOT ULCER WITH OSTEOMYELITIS (HCC): ICD-10-CM

## 2019-10-07 DIAGNOSIS — E11.621 DIABETIC FOOT ULCER WITH OSTEOMYELITIS (HCC): ICD-10-CM

## 2019-10-07 DIAGNOSIS — L03.314 CELLULITIS OF GROIN: ICD-10-CM

## 2019-10-07 DIAGNOSIS — E11.69 DIABETIC FOOT ULCER WITH OSTEOMYELITIS (HCC): ICD-10-CM

## 2019-10-07 LAB
ALBUMIN SERPL-MCNC: 4 G/DL (ref 3.5–5.2)
ALBUMIN/GLOB SERPL: 1 G/DL
ALP SERPL-CCNC: 114 U/L (ref 39–117)
ALT SERPL W P-5'-P-CCNC: 15 U/L (ref 1–41)
ANION GAP SERPL CALCULATED.3IONS-SCNC: 9 MMOL/L (ref 5–15)
AST SERPL-CCNC: 20 U/L (ref 1–40)
BASOPHILS # BLD AUTO: 0.02 10*3/MM3 (ref 0–0.2)
BASOPHILS NFR BLD AUTO: 0.4 % (ref 0–1.5)
BILIRUB SERPL-MCNC: 0.3 MG/DL (ref 0.2–1.2)
BUN BLD-MCNC: 26 MG/DL (ref 6–20)
BUN/CREAT SERPL: 28.6 (ref 7–25)
CALCIUM SPEC-SCNC: 9.2 MG/DL (ref 8.6–10.5)
CHLORIDE SERPL-SCNC: 99 MMOL/L (ref 98–107)
CO2 SERPL-SCNC: 29 MMOL/L (ref 22–29)
CREAT BLD-MCNC: 0.91 MG/DL (ref 0.76–1.27)
DEPRECATED RDW RBC AUTO: 41.3 FL (ref 37–54)
EOSINOPHIL # BLD AUTO: 0.11 10*3/MM3 (ref 0–0.4)
EOSINOPHIL NFR BLD AUTO: 2.1 % (ref 0.3–6.2)
ERYTHROCYTE [DISTWIDTH] IN BLOOD BY AUTOMATED COUNT: 13.4 % (ref 12.3–15.4)
ERYTHROCYTE [SEDIMENTATION RATE] IN BLOOD: 8 MM/HR (ref 0–15)
GFR SERPL CREATININE-BSD FRML MDRD: 86 ML/MIN/1.73
GLOBULIN UR ELPH-MCNC: 3.9 GM/DL
GLUCOSE BLD-MCNC: 228 MG/DL (ref 65–99)
HBA1C MFR BLD: 7.1 % (ref 4.8–5.6)
HCT VFR BLD AUTO: 42.1 % (ref 37.5–51)
HGB BLD-MCNC: 14.5 G/DL (ref 13–17.7)
IMM GRANULOCYTES # BLD AUTO: 0.02 10*3/MM3 (ref 0–0.05)
IMM GRANULOCYTES NFR BLD AUTO: 0.4 % (ref 0–0.5)
LYMPHOCYTES # BLD AUTO: 1.29 10*3/MM3 (ref 0.7–3.1)
LYMPHOCYTES NFR BLD AUTO: 24.3 % (ref 19.6–45.3)
MCH RBC QN AUTO: 29.6 PG (ref 26.6–33)
MCHC RBC AUTO-ENTMCNC: 34.4 G/DL (ref 31.5–35.7)
MCV RBC AUTO: 85.9 FL (ref 79–97)
MONOCYTES # BLD AUTO: 0.6 10*3/MM3 (ref 0.1–0.9)
MONOCYTES NFR BLD AUTO: 11.3 % (ref 5–12)
NEUTROPHILS # BLD AUTO: 3.27 10*3/MM3 (ref 1.7–7)
NEUTROPHILS NFR BLD AUTO: 61.5 % (ref 42.7–76)
NRBC BLD AUTO-RTO: 0 /100 WBC (ref 0–0.2)
PLATELET # BLD AUTO: 186 10*3/MM3 (ref 140–450)
PMV BLD AUTO: 9.5 FL (ref 6–12)
POTASSIUM BLD-SCNC: 5.2 MMOL/L (ref 3.5–5.2)
PREALB SERPL-MCNC: 20.5 MG/DL (ref 20–40)
PROT SERPL-MCNC: 7.9 G/DL (ref 6–8.5)
RBC # BLD AUTO: 4.9 10*6/MM3 (ref 4.14–5.8)
SODIUM BLD-SCNC: 137 MMOL/L (ref 136–145)
VANCOMYCIN TROUGH SERPL-MCNC: 19 MCG/ML (ref 5–20)
WBC NRBC COR # BLD: 5.31 10*3/MM3 (ref 3.4–10.8)

## 2019-10-07 PROCEDURE — 85025 COMPLETE CBC W/AUTO DIFF WBC: CPT

## 2019-10-07 PROCEDURE — 80053 COMPREHEN METABOLIC PANEL: CPT

## 2019-10-07 PROCEDURE — 84134 ASSAY OF PREALBUMIN: CPT

## 2019-10-07 PROCEDURE — 80202 ASSAY OF VANCOMYCIN: CPT

## 2019-10-07 PROCEDURE — 36415 COLL VENOUS BLD VENIPUNCTURE: CPT

## 2019-10-07 PROCEDURE — 83036 HEMOGLOBIN GLYCOSYLATED A1C: CPT

## 2019-10-07 PROCEDURE — 85651 RBC SED RATE NONAUTOMATED: CPT

## 2019-10-07 PROCEDURE — G0277 HBOT, FULL BODY CHAMBER, 30M: HCPCS

## 2019-10-07 PROCEDURE — G0463 HOSPITAL OUTPT CLINIC VISIT: HCPCS

## 2019-10-08 ENCOUNTER — OFFICE VISIT (OUTPATIENT)
Dept: WOUND CARE | Facility: HOSPITAL | Age: 59
End: 2019-10-08

## 2019-10-08 PROCEDURE — G0277 HBOT, FULL BODY CHAMBER, 30M: HCPCS

## 2019-10-09 ENCOUNTER — OFFICE VISIT (OUTPATIENT)
Dept: WOUND CARE | Facility: HOSPITAL | Age: 59
End: 2019-10-09

## 2019-10-09 DIAGNOSIS — E11.65 UNCONTROLLED TYPE 2 DIABETES MELLITUS WITH HYPERGLYCEMIA (HCC): ICD-10-CM

## 2019-10-09 PROCEDURE — G0277 HBOT, FULL BODY CHAMBER, 30M: HCPCS

## 2019-10-09 RX ORDER — GLYBURIDE 5 MG/1
TABLET ORAL
Qty: 180 TABLET | Refills: 0 | Status: SHIPPED | OUTPATIENT
Start: 2019-10-09 | End: 2019-12-11

## 2019-10-09 RX ORDER — EMPAGLIFLOZIN 25 MG/1
TABLET, FILM COATED ORAL
Qty: 90 TABLET | Refills: 0 | Status: SHIPPED | OUTPATIENT
Start: 2019-10-09 | End: 2020-01-24

## 2019-10-10 ENCOUNTER — OFFICE VISIT (OUTPATIENT)
Dept: WOUND CARE | Facility: HOSPITAL | Age: 59
End: 2019-10-10

## 2019-10-10 ENCOUNTER — HOSPITAL ENCOUNTER (OUTPATIENT)
Dept: GENERAL RADIOLOGY | Facility: HOSPITAL | Age: 59
Discharge: HOME | End: 2019-10-10
Admitting: PODIATRIST

## 2019-10-10 ENCOUNTER — TRANSCRIBE ORDERS (OUTPATIENT)
Dept: ADMINISTRATIVE | Facility: HOSPITAL | Age: 59
End: 2019-10-10

## 2019-10-10 DIAGNOSIS — E11.621 DIABETIC FOOT ULCER WITH OSTEOMYELITIS (HCC): ICD-10-CM

## 2019-10-10 DIAGNOSIS — M86.272 SUBACUTE OSTEOMYELITIS, LEFT ANKLE AND FOOT (HCC): ICD-10-CM

## 2019-10-10 DIAGNOSIS — M86.9 DIABETIC FOOT ULCER WITH OSTEOMYELITIS (HCC): ICD-10-CM

## 2019-10-10 DIAGNOSIS — E11.69 DIABETIC FOOT ULCER WITH OSTEOMYELITIS (HCC): ICD-10-CM

## 2019-10-10 DIAGNOSIS — L97.509 DIABETIC FOOT ULCER WITH OSTEOMYELITIS (HCC): Primary | ICD-10-CM

## 2019-10-10 DIAGNOSIS — M86.9 DIABETIC FOOT ULCER WITH OSTEOMYELITIS (HCC): Primary | ICD-10-CM

## 2019-10-10 DIAGNOSIS — E11.621 DIABETIC FOOT ULCER WITH OSTEOMYELITIS (HCC): Primary | ICD-10-CM

## 2019-10-10 DIAGNOSIS — L97.509 DIABETIC FOOT ULCER WITH OSTEOMYELITIS (HCC): ICD-10-CM

## 2019-10-10 DIAGNOSIS — E11.69 DIABETIC FOOT ULCER WITH OSTEOMYELITIS (HCC): Primary | ICD-10-CM

## 2019-10-10 PROCEDURE — G0277 HBOT, FULL BODY CHAMBER, 30M: HCPCS

## 2019-10-10 PROCEDURE — 73630 X-RAY EXAM OF FOOT: CPT

## 2019-10-11 ENCOUNTER — OFFICE VISIT (OUTPATIENT)
Dept: WOUND CARE | Facility: HOSPITAL | Age: 59
End: 2019-10-11

## 2019-10-11 PROCEDURE — G0277 HBOT, FULL BODY CHAMBER, 30M: HCPCS

## 2019-10-14 ENCOUNTER — LAB (OUTPATIENT)
Dept: LAB | Facility: HOSPITAL | Age: 59
End: 2019-10-14

## 2019-10-14 ENCOUNTER — OFFICE VISIT (OUTPATIENT)
Dept: WOUND CARE | Facility: HOSPITAL | Age: 59
End: 2019-10-14

## 2019-10-14 ENCOUNTER — INFUSION (OUTPATIENT)
Dept: ONCOLOGY | Facility: HOSPITAL | Age: 59
End: 2019-10-14

## 2019-10-14 DIAGNOSIS — E11.69 DIABETIC FOOT ULCER WITH OSTEOMYELITIS (HCC): ICD-10-CM

## 2019-10-14 DIAGNOSIS — M86.9 DIABETIC FOOT ULCER WITH OSTEOMYELITIS (HCC): ICD-10-CM

## 2019-10-14 DIAGNOSIS — L97.509 DIABETIC FOOT ULCER WITH OSTEOMYELITIS (HCC): ICD-10-CM

## 2019-10-14 DIAGNOSIS — L03.314 CELLULITIS OF GROIN: ICD-10-CM

## 2019-10-14 DIAGNOSIS — E11.621 DIABETIC FOOT ULCER WITH OSTEOMYELITIS (HCC): ICD-10-CM

## 2019-10-14 LAB
ALBUMIN SERPL-MCNC: 4.2 G/DL (ref 3.5–5.2)
ALBUMIN/GLOB SERPL: 1.1 G/DL
ALP SERPL-CCNC: 125 U/L (ref 39–117)
ALT SERPL W P-5'-P-CCNC: 17 U/L (ref 1–41)
ANION GAP SERPL CALCULATED.3IONS-SCNC: 14 MMOL/L (ref 5–15)
AST SERPL-CCNC: 19 U/L (ref 1–40)
BASOPHILS # BLD AUTO: 0.01 10*3/MM3 (ref 0–0.2)
BASOPHILS NFR BLD AUTO: 0.2 % (ref 0–1.5)
BILIRUB SERPL-MCNC: 0.3 MG/DL (ref 0.2–1.2)
BUN BLD-MCNC: 29 MG/DL (ref 6–20)
BUN/CREAT SERPL: 29.3 (ref 7–25)
CALCIUM SPEC-SCNC: 9.8 MG/DL (ref 8.6–10.5)
CHLORIDE SERPL-SCNC: 100 MMOL/L (ref 98–107)
CO2 SERPL-SCNC: 25 MMOL/L (ref 22–29)
CREAT BLD-MCNC: 0.99 MG/DL (ref 0.76–1.27)
DEPRECATED RDW RBC AUTO: 42.1 FL (ref 37–54)
EOSINOPHIL # BLD AUTO: 0.14 10*3/MM3 (ref 0–0.4)
EOSINOPHIL NFR BLD AUTO: 2.2 % (ref 0.3–6.2)
ERYTHROCYTE [DISTWIDTH] IN BLOOD BY AUTOMATED COUNT: 13.6 % (ref 12.3–15.4)
ERYTHROCYTE [SEDIMENTATION RATE] IN BLOOD: 12 MM/HR (ref 0–15)
GFR SERPL CREATININE-BSD FRML MDRD: 78 ML/MIN/1.73
GLOBULIN UR ELPH-MCNC: 3.9 GM/DL
GLUCOSE BLD-MCNC: 145 MG/DL (ref 65–99)
HBA1C MFR BLD: 7 % (ref 4.8–5.6)
HCT VFR BLD AUTO: 44.2 % (ref 37.5–51)
HGB BLD-MCNC: 15.1 G/DL (ref 13–17.7)
IMM GRANULOCYTES # BLD AUTO: 0.01 10*3/MM3 (ref 0–0.05)
IMM GRANULOCYTES NFR BLD AUTO: 0.2 % (ref 0–0.5)
LYMPHOCYTES # BLD AUTO: 1.37 10*3/MM3 (ref 0.7–3.1)
LYMPHOCYTES NFR BLD AUTO: 21.5 % (ref 19.6–45.3)
MCH RBC QN AUTO: 29.2 PG (ref 26.6–33)
MCHC RBC AUTO-ENTMCNC: 34.2 G/DL (ref 31.5–35.7)
MCV RBC AUTO: 85.5 FL (ref 79–97)
MONOCYTES # BLD AUTO: 0.62 10*3/MM3 (ref 0.1–0.9)
MONOCYTES NFR BLD AUTO: 9.7 % (ref 5–12)
NEUTROPHILS # BLD AUTO: 4.21 10*3/MM3 (ref 1.7–7)
NEUTROPHILS NFR BLD AUTO: 66.2 % (ref 42.7–76)
NRBC BLD AUTO-RTO: 0 /100 WBC (ref 0–0.2)
PLATELET # BLD AUTO: 199 10*3/MM3 (ref 140–450)
PMV BLD AUTO: 9.4 FL (ref 6–12)
POTASSIUM BLD-SCNC: 4.9 MMOL/L (ref 3.5–5.2)
PREALB SERPL-MCNC: 21.2 MG/DL (ref 20–40)
PROT SERPL-MCNC: 8.1 G/DL (ref 6–8.5)
RBC # BLD AUTO: 5.17 10*6/MM3 (ref 4.14–5.8)
SODIUM BLD-SCNC: 139 MMOL/L (ref 136–145)
VANCOMYCIN TROUGH SERPL-MCNC: 22.2 MCG/ML (ref 5–20)
WBC NRBC COR # BLD: 6.36 10*3/MM3 (ref 3.4–10.8)

## 2019-10-14 PROCEDURE — 84134 ASSAY OF PREALBUMIN: CPT

## 2019-10-14 PROCEDURE — 80053 COMPREHEN METABOLIC PANEL: CPT

## 2019-10-14 PROCEDURE — 85025 COMPLETE CBC W/AUTO DIFF WBC: CPT

## 2019-10-14 PROCEDURE — 85651 RBC SED RATE NONAUTOMATED: CPT

## 2019-10-14 PROCEDURE — G0463 HOSPITAL OUTPT CLINIC VISIT: HCPCS

## 2019-10-14 PROCEDURE — 83036 HEMOGLOBIN GLYCOSYLATED A1C: CPT

## 2019-10-14 PROCEDURE — G0277 HBOT, FULL BODY CHAMBER, 30M: HCPCS

## 2019-10-14 PROCEDURE — 80202 ASSAY OF VANCOMYCIN: CPT

## 2019-10-14 PROCEDURE — 36415 COLL VENOUS BLD VENIPUNCTURE: CPT

## 2019-10-15 ENCOUNTER — OFFICE VISIT (OUTPATIENT)
Dept: WOUND CARE | Facility: HOSPITAL | Age: 59
End: 2019-10-15

## 2019-10-15 PROCEDURE — G0277 HBOT, FULL BODY CHAMBER, 30M: HCPCS

## 2019-10-16 ENCOUNTER — OFFICE VISIT (OUTPATIENT)
Dept: WOUND CARE | Facility: HOSPITAL | Age: 59
End: 2019-10-16

## 2019-10-16 PROCEDURE — G0277 HBOT, FULL BODY CHAMBER, 30M: HCPCS

## 2019-10-17 ENCOUNTER — OFFICE VISIT (OUTPATIENT)
Dept: WOUND CARE | Facility: HOSPITAL | Age: 59
End: 2019-10-17

## 2019-10-17 PROCEDURE — G0277 HBOT, FULL BODY CHAMBER, 30M: HCPCS

## 2019-10-18 ENCOUNTER — APPOINTMENT (OUTPATIENT)
Dept: WOUND CARE | Facility: HOSPITAL | Age: 59
End: 2019-10-18

## 2019-10-21 ENCOUNTER — OFFICE VISIT (OUTPATIENT)
Dept: WOUND CARE | Facility: HOSPITAL | Age: 59
End: 2019-10-21

## 2019-10-21 ENCOUNTER — LAB (OUTPATIENT)
Dept: LAB | Facility: HOSPITAL | Age: 59
End: 2019-10-21

## 2019-10-21 ENCOUNTER — INFUSION (OUTPATIENT)
Dept: ONCOLOGY | Facility: HOSPITAL | Age: 59
End: 2019-10-21

## 2019-10-21 VITALS
WEIGHT: 196 LBS | HEART RATE: 107 BPM | DIASTOLIC BLOOD PRESSURE: 60 MMHG | OXYGEN SATURATION: 100 % | BODY MASS INDEX: 27.44 KG/M2 | SYSTOLIC BLOOD PRESSURE: 131 MMHG | HEIGHT: 71 IN | RESPIRATION RATE: 16 BRPM | TEMPERATURE: 98.5 F

## 2019-10-21 DIAGNOSIS — E11.628 DIABETIC FOOT INFECTION (HCC): ICD-10-CM

## 2019-10-21 DIAGNOSIS — E11.621 DIABETIC FOOT ULCER WITH OSTEOMYELITIS (HCC): ICD-10-CM

## 2019-10-21 DIAGNOSIS — E11.69 DIABETIC FOOT ULCER WITH OSTEOMYELITIS (HCC): ICD-10-CM

## 2019-10-21 DIAGNOSIS — L97.509 DIABETIC FOOT ULCER WITH OSTEOMYELITIS (HCC): ICD-10-CM

## 2019-10-21 DIAGNOSIS — M86.9 DIABETIC FOOT ULCER WITH OSTEOMYELITIS (HCC): ICD-10-CM

## 2019-10-21 DIAGNOSIS — L08.9 DIABETIC FOOT INFECTION (HCC): ICD-10-CM

## 2019-10-21 LAB
ALBUMIN SERPL-MCNC: 3.8 G/DL (ref 3.5–5.2)
ALBUMIN/GLOB SERPL: 1.1 G/DL
ALP SERPL-CCNC: 108 U/L (ref 39–117)
ALT SERPL W P-5'-P-CCNC: 20 U/L (ref 1–41)
ANION GAP SERPL CALCULATED.3IONS-SCNC: 12 MMOL/L (ref 5–15)
AST SERPL-CCNC: 20 U/L (ref 1–40)
BASOPHILS # BLD AUTO: 0.02 10*3/MM3 (ref 0–0.2)
BASOPHILS NFR BLD AUTO: 0.4 % (ref 0–1.5)
BILIRUB SERPL-MCNC: 0.4 MG/DL (ref 0.2–1.2)
BUN BLD-MCNC: 12 MG/DL (ref 6–20)
BUN/CREAT SERPL: 13.3 (ref 7–25)
CALCIUM SPEC-SCNC: 8.4 MG/DL (ref 8.6–10.5)
CHLORIDE SERPL-SCNC: 99 MMOL/L (ref 98–107)
CO2 SERPL-SCNC: 27 MMOL/L (ref 22–29)
CREAT BLD-MCNC: 0.9 MG/DL (ref 0.76–1.27)
DEPRECATED RDW RBC AUTO: 42.2 FL (ref 37–54)
EOSINOPHIL # BLD AUTO: 0.14 10*3/MM3 (ref 0–0.4)
EOSINOPHIL NFR BLD AUTO: 2.8 % (ref 0.3–6.2)
ERYTHROCYTE [DISTWIDTH] IN BLOOD BY AUTOMATED COUNT: 13.6 % (ref 12.3–15.4)
ERYTHROCYTE [SEDIMENTATION RATE] IN BLOOD: 4 MM/HR (ref 0–15)
GFR SERPL CREATININE-BSD FRML MDRD: 87 ML/MIN/1.73
GLOBULIN UR ELPH-MCNC: 3.5 GM/DL
GLUCOSE BLD-MCNC: 158 MG/DL (ref 65–99)
HCT VFR BLD AUTO: 41.1 % (ref 37.5–51)
HGB BLD-MCNC: 14.3 G/DL (ref 13–17.7)
IMM GRANULOCYTES # BLD AUTO: 0.05 10*3/MM3 (ref 0–0.05)
IMM GRANULOCYTES NFR BLD AUTO: 1 % (ref 0–0.5)
LYMPHOCYTES # BLD AUTO: 1.34 10*3/MM3 (ref 0.7–3.1)
LYMPHOCYTES NFR BLD AUTO: 26.9 % (ref 19.6–45.3)
MCH RBC QN AUTO: 29.5 PG (ref 26.6–33)
MCHC RBC AUTO-ENTMCNC: 34.8 G/DL (ref 31.5–35.7)
MCV RBC AUTO: 84.9 FL (ref 79–97)
MONOCYTES # BLD AUTO: 0.4 10*3/MM3 (ref 0.1–0.9)
MONOCYTES NFR BLD AUTO: 8 % (ref 5–12)
NEUTROPHILS # BLD AUTO: 3.03 10*3/MM3 (ref 1.7–7)
NEUTROPHILS NFR BLD AUTO: 60.9 % (ref 42.7–76)
NRBC BLD AUTO-RTO: 0 /100 WBC (ref 0–0.2)
PLATELET # BLD AUTO: 172 10*3/MM3 (ref 140–450)
PMV BLD AUTO: 9.7 FL (ref 6–12)
POTASSIUM BLD-SCNC: 3.9 MMOL/L (ref 3.5–5.2)
PREALB SERPL-MCNC: 16.2 MG/DL (ref 20–40)
PROT SERPL-MCNC: 7.3 G/DL (ref 6–8.5)
RBC # BLD AUTO: 4.84 10*6/MM3 (ref 4.14–5.8)
SODIUM BLD-SCNC: 138 MMOL/L (ref 136–145)
VANCOMYCIN TROUGH SERPL-MCNC: 19.5 MCG/ML (ref 5–20)
WBC NRBC COR # BLD: 4.98 10*3/MM3 (ref 3.4–10.8)

## 2019-10-21 PROCEDURE — 85651 RBC SED RATE NONAUTOMATED: CPT

## 2019-10-21 PROCEDURE — 80202 ASSAY OF VANCOMYCIN: CPT

## 2019-10-21 PROCEDURE — G0463 HOSPITAL OUTPT CLINIC VISIT: HCPCS

## 2019-10-21 PROCEDURE — 84134 ASSAY OF PREALBUMIN: CPT

## 2019-10-21 PROCEDURE — 85025 COMPLETE CBC W/AUTO DIFF WBC: CPT

## 2019-10-21 PROCEDURE — 80053 COMPREHEN METABOLIC PANEL: CPT

## 2019-10-21 PROCEDURE — 36415 COLL VENOUS BLD VENIPUNCTURE: CPT

## 2019-10-21 PROCEDURE — G0277 HBOT, FULL BODY CHAMBER, 30M: HCPCS

## 2019-10-22 ENCOUNTER — OFFICE VISIT (OUTPATIENT)
Dept: WOUND CARE | Facility: HOSPITAL | Age: 59
End: 2019-10-22

## 2019-10-22 PROCEDURE — G0277 HBOT, FULL BODY CHAMBER, 30M: HCPCS

## 2019-10-23 ENCOUNTER — OFFICE VISIT (OUTPATIENT)
Dept: WOUND CARE | Facility: HOSPITAL | Age: 59
End: 2019-10-23

## 2019-10-23 PROCEDURE — G0277 HBOT, FULL BODY CHAMBER, 30M: HCPCS

## 2019-10-24 ENCOUNTER — OFFICE VISIT (OUTPATIENT)
Dept: WOUND CARE | Facility: HOSPITAL | Age: 59
End: 2019-10-24

## 2019-10-24 PROCEDURE — G0277 HBOT, FULL BODY CHAMBER, 30M: HCPCS

## 2019-10-25 ENCOUNTER — OFFICE VISIT (OUTPATIENT)
Dept: WOUND CARE | Facility: HOSPITAL | Age: 59
End: 2019-10-25

## 2019-10-25 PROCEDURE — G0277 HBOT, FULL BODY CHAMBER, 30M: HCPCS

## 2019-10-28 ENCOUNTER — LAB (OUTPATIENT)
Dept: LAB | Facility: HOSPITAL | Age: 59
End: 2019-10-28

## 2019-10-28 ENCOUNTER — OFFICE VISIT (OUTPATIENT)
Dept: WOUND CARE | Facility: HOSPITAL | Age: 59
End: 2019-10-28

## 2019-10-28 ENCOUNTER — INFUSION (OUTPATIENT)
Dept: ONCOLOGY | Facility: HOSPITAL | Age: 59
End: 2019-10-28

## 2019-10-28 DIAGNOSIS — L03.314 CELLULITIS OF GROIN: ICD-10-CM

## 2019-10-28 DIAGNOSIS — M86.9 DIABETIC FOOT ULCER WITH OSTEOMYELITIS (HCC): ICD-10-CM

## 2019-10-28 DIAGNOSIS — E11.621 DIABETIC FOOT ULCER WITH OSTEOMYELITIS (HCC): ICD-10-CM

## 2019-10-28 DIAGNOSIS — E11.69 DIABETIC FOOT ULCER WITH OSTEOMYELITIS (HCC): ICD-10-CM

## 2019-10-28 DIAGNOSIS — L97.509 DIABETIC FOOT ULCER WITH OSTEOMYELITIS (HCC): ICD-10-CM

## 2019-10-28 LAB
ALBUMIN SERPL-MCNC: 3.9 G/DL (ref 3.5–5.2)
ALBUMIN/GLOB SERPL: 1.2 G/DL
ALP SERPL-CCNC: 100 U/L (ref 39–117)
ALT SERPL W P-5'-P-CCNC: 20 U/L (ref 1–41)
ANION GAP SERPL CALCULATED.3IONS-SCNC: 11 MMOL/L (ref 5–15)
AST SERPL-CCNC: 18 U/L (ref 1–40)
BASOPHILS # BLD AUTO: 0.02 10*3/MM3 (ref 0–0.2)
BASOPHILS NFR BLD AUTO: 0.4 % (ref 0–1.5)
BILIRUB SERPL-MCNC: 0.3 MG/DL (ref 0.2–1.2)
BUN BLD-MCNC: 32 MG/DL (ref 6–20)
BUN/CREAT SERPL: 31.7 (ref 7–25)
CALCIUM SPEC-SCNC: 9.1 MG/DL (ref 8.6–10.5)
CHLORIDE SERPL-SCNC: 97 MMOL/L (ref 98–107)
CO2 SERPL-SCNC: 27 MMOL/L (ref 22–29)
CREAT BLD-MCNC: 1.01 MG/DL (ref 0.76–1.27)
DEPRECATED RDW RBC AUTO: 42.7 FL (ref 37–54)
EOSINOPHIL # BLD AUTO: 0.13 10*3/MM3 (ref 0–0.4)
EOSINOPHIL NFR BLD AUTO: 2.4 % (ref 0.3–6.2)
ERYTHROCYTE [DISTWIDTH] IN BLOOD BY AUTOMATED COUNT: 13.8 % (ref 12.3–15.4)
ERYTHROCYTE [SEDIMENTATION RATE] IN BLOOD: 6 MM/HR (ref 0–15)
GFR SERPL CREATININE-BSD FRML MDRD: 76 ML/MIN/1.73
GLOBULIN UR ELPH-MCNC: 3.3 GM/DL
GLUCOSE BLD-MCNC: 349 MG/DL (ref 65–99)
HBA1C MFR BLD: 7.4 % (ref 4.8–5.6)
HCT VFR BLD AUTO: 41.1 % (ref 37.5–51)
HGB BLD-MCNC: 14.1 G/DL (ref 13–17.7)
IMM GRANULOCYTES # BLD AUTO: 0.04 10*3/MM3 (ref 0–0.05)
IMM GRANULOCYTES NFR BLD AUTO: 0.8 % (ref 0–0.5)
LYMPHOCYTES # BLD AUTO: 1.39 10*3/MM3 (ref 0.7–3.1)
LYMPHOCYTES NFR BLD AUTO: 26.1 % (ref 19.6–45.3)
MCH RBC QN AUTO: 29.5 PG (ref 26.6–33)
MCHC RBC AUTO-ENTMCNC: 34.3 G/DL (ref 31.5–35.7)
MCV RBC AUTO: 86 FL (ref 79–97)
MONOCYTES # BLD AUTO: 0.66 10*3/MM3 (ref 0.1–0.9)
MONOCYTES NFR BLD AUTO: 12.4 % (ref 5–12)
NEUTROPHILS # BLD AUTO: 3.09 10*3/MM3 (ref 1.7–7)
NEUTROPHILS NFR BLD AUTO: 57.9 % (ref 42.7–76)
NRBC BLD AUTO-RTO: 0 /100 WBC (ref 0–0.2)
PLATELET # BLD AUTO: 155 10*3/MM3 (ref 140–450)
PMV BLD AUTO: 9.7 FL (ref 6–12)
POTASSIUM BLD-SCNC: 4.7 MMOL/L (ref 3.5–5.2)
PREALB SERPL-MCNC: 23.6 MG/DL (ref 20–40)
PROT SERPL-MCNC: 7.2 G/DL (ref 6–8.5)
RBC # BLD AUTO: 4.78 10*6/MM3 (ref 4.14–5.8)
SODIUM BLD-SCNC: 135 MMOL/L (ref 136–145)
VANCOMYCIN TROUGH SERPL-MCNC: 20 MCG/ML (ref 5–20)
WBC NRBC COR # BLD: 5.33 10*3/MM3 (ref 3.4–10.8)

## 2019-10-28 PROCEDURE — G0463 HOSPITAL OUTPT CLINIC VISIT: HCPCS

## 2019-10-28 PROCEDURE — 36415 COLL VENOUS BLD VENIPUNCTURE: CPT

## 2019-10-28 PROCEDURE — 83036 HEMOGLOBIN GLYCOSYLATED A1C: CPT

## 2019-10-28 PROCEDURE — 85651 RBC SED RATE NONAUTOMATED: CPT

## 2019-10-28 PROCEDURE — 84134 ASSAY OF PREALBUMIN: CPT

## 2019-10-28 PROCEDURE — 80053 COMPREHEN METABOLIC PANEL: CPT

## 2019-10-28 PROCEDURE — 80202 ASSAY OF VANCOMYCIN: CPT

## 2019-10-28 PROCEDURE — G0277 HBOT, FULL BODY CHAMBER, 30M: HCPCS

## 2019-10-28 PROCEDURE — 85025 COMPLETE CBC W/AUTO DIFF WBC: CPT

## 2019-10-29 ENCOUNTER — OFFICE VISIT (OUTPATIENT)
Dept: WOUND CARE | Facility: HOSPITAL | Age: 59
End: 2019-10-29

## 2019-10-29 PROCEDURE — G0277 HBOT, FULL BODY CHAMBER, 30M: HCPCS

## 2019-10-30 ENCOUNTER — OFFICE VISIT (OUTPATIENT)
Dept: WOUND CARE | Facility: HOSPITAL | Age: 59
End: 2019-10-30

## 2019-10-30 PROCEDURE — G0277 HBOT, FULL BODY CHAMBER, 30M: HCPCS

## 2019-10-31 ENCOUNTER — OFFICE VISIT (OUTPATIENT)
Dept: WOUND CARE | Facility: HOSPITAL | Age: 59
End: 2019-10-31

## 2019-10-31 PROCEDURE — G0277 HBOT, FULL BODY CHAMBER, 30M: HCPCS

## 2019-11-01 ENCOUNTER — OFFICE VISIT (OUTPATIENT)
Dept: WOUND CARE | Facility: HOSPITAL | Age: 59
End: 2019-11-01

## 2019-11-01 PROCEDURE — G0277 HBOT, FULL BODY CHAMBER, 30M: HCPCS

## 2019-11-04 ENCOUNTER — OFFICE VISIT (OUTPATIENT)
Dept: WOUND CARE | Facility: HOSPITAL | Age: 59
End: 2019-11-04

## 2019-11-04 ENCOUNTER — LAB (OUTPATIENT)
Dept: LAB | Facility: HOSPITAL | Age: 59
End: 2019-11-04

## 2019-11-04 ENCOUNTER — TRANSCRIBE ORDERS (OUTPATIENT)
Dept: ADMINISTRATIVE | Facility: HOSPITAL | Age: 59
End: 2019-11-04

## 2019-11-04 ENCOUNTER — INFUSION (OUTPATIENT)
Dept: ONCOLOGY | Facility: HOSPITAL | Age: 59
End: 2019-11-04

## 2019-11-04 VITALS
HEIGHT: 71 IN | SYSTOLIC BLOOD PRESSURE: 126 MMHG | DIASTOLIC BLOOD PRESSURE: 72 MMHG | BODY MASS INDEX: 27.3 KG/M2 | TEMPERATURE: 98 F | OXYGEN SATURATION: 100 % | HEART RATE: 93 BPM | WEIGHT: 195 LBS | RESPIRATION RATE: 20 BRPM

## 2019-11-04 DIAGNOSIS — M86.9 OSTEOMYELITIS OF ANKLE AND FOOT (HCC): Primary | ICD-10-CM

## 2019-11-04 DIAGNOSIS — L08.9 DIABETIC FOOT INFECTION (HCC): ICD-10-CM

## 2019-11-04 DIAGNOSIS — E11.628 DIABETIC FOOT INFECTION (HCC): ICD-10-CM

## 2019-11-04 LAB
HOLD SPECIMEN: NORMAL
VANCOMYCIN TROUGH SERPL-MCNC: 21.6 MCG/ML (ref 5–20)
WHOLE BLOOD HOLD SPECIMEN: NORMAL

## 2019-11-04 PROCEDURE — 80202 ASSAY OF VANCOMYCIN: CPT

## 2019-11-04 PROCEDURE — G0463 HOSPITAL OUTPT CLINIC VISIT: HCPCS

## 2019-11-04 PROCEDURE — 36415 COLL VENOUS BLD VENIPUNCTURE: CPT

## 2019-11-04 PROCEDURE — G0277 HBOT, FULL BODY CHAMBER, 30M: HCPCS

## 2019-11-05 ENCOUNTER — OFFICE VISIT (OUTPATIENT)
Dept: WOUND CARE | Facility: HOSPITAL | Age: 59
End: 2019-11-05

## 2019-11-05 PROCEDURE — G0277 HBOT, FULL BODY CHAMBER, 30M: HCPCS

## 2019-11-06 ENCOUNTER — OFFICE VISIT (OUTPATIENT)
Dept: WOUND CARE | Facility: HOSPITAL | Age: 59
End: 2019-11-06

## 2019-11-06 PROCEDURE — G0277 HBOT, FULL BODY CHAMBER, 30M: HCPCS

## 2019-11-07 ENCOUNTER — TELEPHONE (OUTPATIENT)
Dept: PODIATRY | Facility: CLINIC | Age: 59
End: 2019-11-07

## 2019-11-07 ENCOUNTER — OFFICE VISIT (OUTPATIENT)
Dept: WOUND CARE | Facility: HOSPITAL | Age: 59
End: 2019-11-07

## 2019-11-07 PROCEDURE — G0277 HBOT, FULL BODY CHAMBER, 30M: HCPCS

## 2019-11-07 NOTE — TELEPHONE ENCOUNTER
CALLED PT AND HE WAS AT WOUND CARE AND HE SAID HE DID NOT NEED THIS APPT.  I  TOLD THE PT TO BE SURE TO TALK TO DR MONTOYA  AND MAKE SURE IT WAS OK TO CANCEL THIS APPT FOR Monday 11/11/19. PT SAID TO GO AHEAD AND CANCEL APPT THAT HE DID NOT NEED TO KEEP IT.

## 2019-11-08 ENCOUNTER — OFFICE VISIT (OUTPATIENT)
Dept: WOUND CARE | Facility: HOSPITAL | Age: 59
End: 2019-11-08

## 2019-11-08 PROCEDURE — G0277 HBOT, FULL BODY CHAMBER, 30M: HCPCS

## 2019-11-11 ENCOUNTER — OFFICE VISIT (OUTPATIENT)
Dept: WOUND CARE | Facility: HOSPITAL | Age: 59
End: 2019-11-11

## 2019-11-11 PROCEDURE — G0277 HBOT, FULL BODY CHAMBER, 30M: HCPCS

## 2019-11-12 ENCOUNTER — LAB (OUTPATIENT)
Dept: LAB | Facility: HOSPITAL | Age: 59
End: 2019-11-12

## 2019-11-12 ENCOUNTER — INFUSION (OUTPATIENT)
Dept: ONCOLOGY | Facility: HOSPITAL | Age: 59
End: 2019-11-12

## 2019-11-12 ENCOUNTER — OFFICE VISIT (OUTPATIENT)
Dept: WOUND CARE | Facility: HOSPITAL | Age: 59
End: 2019-11-12

## 2019-11-12 ENCOUNTER — TRANSCRIBE ORDERS (OUTPATIENT)
Dept: ADMINISTRATIVE | Facility: HOSPITAL | Age: 59
End: 2019-11-12

## 2019-11-12 VITALS
RESPIRATION RATE: 16 BRPM | DIASTOLIC BLOOD PRESSURE: 74 MMHG | WEIGHT: 195 LBS | HEART RATE: 89 BPM | BODY MASS INDEX: 27.3 KG/M2 | HEIGHT: 71 IN | SYSTOLIC BLOOD PRESSURE: 128 MMHG | OXYGEN SATURATION: 100 % | TEMPERATURE: 97.2 F

## 2019-11-12 DIAGNOSIS — M86.9 OSTEOMYELITIS OF ANKLE AND FOOT (HCC): ICD-10-CM

## 2019-11-12 DIAGNOSIS — M86.172 ACUTE OSTEOMYELITIS OF LEFT ANKLE OR FOOT (HCC): Primary | ICD-10-CM

## 2019-11-12 DIAGNOSIS — L08.9 DIABETIC FOOT INFECTION (HCC): ICD-10-CM

## 2019-11-12 DIAGNOSIS — E11.628 DIABETIC FOOT INFECTION (HCC): ICD-10-CM

## 2019-11-12 LAB — VANCOMYCIN TROUGH SERPL-MCNC: 21.2 MCG/ML (ref 5–20)

## 2019-11-12 PROCEDURE — 80202 ASSAY OF VANCOMYCIN: CPT

## 2019-11-12 PROCEDURE — G0277 HBOT, FULL BODY CHAMBER, 30M: HCPCS

## 2019-11-12 PROCEDURE — G0463 HOSPITAL OUTPT CLINIC VISIT: HCPCS

## 2019-11-12 PROCEDURE — 36415 COLL VENOUS BLD VENIPUNCTURE: CPT

## 2019-11-13 ENCOUNTER — OFFICE VISIT (OUTPATIENT)
Dept: WOUND CARE | Facility: HOSPITAL | Age: 59
End: 2019-11-13

## 2019-11-13 PROCEDURE — G0277 HBOT, FULL BODY CHAMBER, 30M: HCPCS

## 2019-11-14 ENCOUNTER — OFFICE VISIT (OUTPATIENT)
Dept: WOUND CARE | Facility: HOSPITAL | Age: 59
End: 2019-11-14

## 2019-11-18 ENCOUNTER — HOSPITAL ENCOUNTER (OUTPATIENT)
Dept: MRI IMAGING | Facility: HOSPITAL | Age: 59
Discharge: HOME | End: 2019-11-18
Admitting: PODIATRIST

## 2019-11-18 DIAGNOSIS — I10 ESSENTIAL HYPERTENSION: Chronic | ICD-10-CM

## 2019-11-18 PROCEDURE — 73718 MRI LOWER EXTREMITY W/O DYE: CPT

## 2019-11-18 RX ORDER — LISINOPRIL 10 MG/1
10 TABLET ORAL DAILY
Qty: 90 TABLET | Refills: 3 | Status: SHIPPED | OUTPATIENT
Start: 2019-11-18 | End: 2021-04-01 | Stop reason: SDUPTHER

## 2019-11-21 ENCOUNTER — OFFICE VISIT (OUTPATIENT)
Dept: WOUND CARE | Facility: HOSPITAL | Age: 59
End: 2019-11-21

## 2019-11-26 ENCOUNTER — OFFICE VISIT (OUTPATIENT)
Dept: WOUND CARE | Facility: HOSPITAL | Age: 59
End: 2019-11-26

## 2019-12-02 ENCOUNTER — OFFICE VISIT (OUTPATIENT)
Dept: WOUND CARE | Facility: HOSPITAL | Age: 59
End: 2019-12-02

## 2019-12-02 PROCEDURE — G0277 HBOT, FULL BODY CHAMBER, 30M: HCPCS

## 2019-12-03 ENCOUNTER — OFFICE VISIT (OUTPATIENT)
Dept: WOUND CARE | Facility: HOSPITAL | Age: 59
End: 2019-12-03

## 2019-12-03 DIAGNOSIS — E83.52 HYPERCALCEMIA: Primary | ICD-10-CM

## 2019-12-03 DIAGNOSIS — N28.9 RENAL INSUFFICIENCY: ICD-10-CM

## 2019-12-03 DIAGNOSIS — I10 ESSENTIAL HYPERTENSION: Chronic | ICD-10-CM

## 2019-12-03 DIAGNOSIS — E78.2 MIXED HYPERLIPIDEMIA: Chronic | ICD-10-CM

## 2019-12-03 DIAGNOSIS — E11.65 UNCONTROLLED TYPE 2 DIABETES MELLITUS WITH HYPERGLYCEMIA (HCC): ICD-10-CM

## 2019-12-03 PROCEDURE — G0277 HBOT, FULL BODY CHAMBER, 30M: HCPCS

## 2019-12-04 ENCOUNTER — RESULTS ENCOUNTER (OUTPATIENT)
Dept: FAMILY MEDICINE CLINIC | Facility: CLINIC | Age: 59
End: 2019-12-04

## 2019-12-04 ENCOUNTER — OFFICE VISIT (OUTPATIENT)
Dept: WOUND CARE | Facility: HOSPITAL | Age: 59
End: 2019-12-04

## 2019-12-04 DIAGNOSIS — I10 ESSENTIAL HYPERTENSION: Chronic | ICD-10-CM

## 2019-12-04 DIAGNOSIS — E11.65 UNCONTROLLED TYPE 2 DIABETES MELLITUS WITH HYPERGLYCEMIA (HCC): ICD-10-CM

## 2019-12-04 DIAGNOSIS — E83.52 HYPERCALCEMIA: ICD-10-CM

## 2019-12-04 DIAGNOSIS — E78.2 MIXED HYPERLIPIDEMIA: Chronic | ICD-10-CM

## 2019-12-04 DIAGNOSIS — N28.9 RENAL INSUFFICIENCY: ICD-10-CM

## 2019-12-04 PROCEDURE — G0277 HBOT, FULL BODY CHAMBER, 30M: HCPCS

## 2019-12-05 ENCOUNTER — OFFICE VISIT (OUTPATIENT)
Dept: WOUND CARE | Facility: HOSPITAL | Age: 59
End: 2019-12-05

## 2019-12-05 LAB
ALBUMIN SERPL-MCNC: 4.4 G/DL (ref 3.5–5.2)
ALBUMIN/GLOB SERPL: 1.4 G/DL
ALP SERPL-CCNC: 117 U/L (ref 39–117)
ALT SERPL-CCNC: 13 U/L (ref 1–41)
AST SERPL-CCNC: 16 U/L (ref 1–40)
BASOPHILS # BLD AUTO: 0.03 10*3/MM3 (ref 0–0.2)
BASOPHILS NFR BLD AUTO: 0.5 % (ref 0–1.5)
BILIRUB SERPL-MCNC: 0.3 MG/DL (ref 0.2–1.2)
BUN SERPL-MCNC: 20 MG/DL (ref 6–20)
BUN/CREAT SERPL: 15.6 (ref 7–25)
CALCIUM SERPL-MCNC: 10 MG/DL (ref 8.6–10.5)
CHLORIDE SERPL-SCNC: 99 MMOL/L (ref 98–107)
CHOLEST SERPL-MCNC: 146 MG/DL (ref 0–200)
CO2 SERPL-SCNC: 28.4 MMOL/L (ref 22–29)
CREAT SERPL-MCNC: 1.28 MG/DL (ref 0.76–1.27)
EOSINOPHIL # BLD AUTO: 0.2 10*3/MM3 (ref 0–0.4)
EOSINOPHIL NFR BLD AUTO: 3.5 % (ref 0.3–6.2)
ERYTHROCYTE [DISTWIDTH] IN BLOOD BY AUTOMATED COUNT: 13.4 % (ref 12.3–15.4)
FOLATE SERPL-MCNC: 10.9 NG/ML (ref 4.78–24.2)
GLOBULIN SER CALC-MCNC: 3.2 GM/DL
GLUCOSE SERPL-MCNC: 78 MG/DL (ref 65–99)
HBA1C MFR BLD: 8 % (ref 4.8–5.6)
HCT VFR BLD AUTO: 44.7 % (ref 37.5–51)
HDLC SERPL-MCNC: 42 MG/DL (ref 40–60)
HGB BLD-MCNC: 15.2 G/DL (ref 13–17.7)
IMM GRANULOCYTES # BLD AUTO: 0.02 10*3/MM3 (ref 0–0.05)
IMM GRANULOCYTES NFR BLD AUTO: 0.4 % (ref 0–0.5)
LDLC SERPL CALC-MCNC: 87 MG/DL (ref 0–100)
LYMPHOCYTES # BLD AUTO: 1.7 10*3/MM3 (ref 0.7–3.1)
LYMPHOCYTES NFR BLD AUTO: 29.8 % (ref 19.6–45.3)
MCH RBC QN AUTO: 30.3 PG (ref 26.6–33)
MCHC RBC AUTO-ENTMCNC: 34 G/DL (ref 31.5–35.7)
MCV RBC AUTO: 89 FL (ref 79–97)
MICROALBUMIN UR-MCNC: 9.5 UG/ML
MONOCYTES # BLD AUTO: 0.65 10*3/MM3 (ref 0.1–0.9)
MONOCYTES NFR BLD AUTO: 11.4 % (ref 5–12)
NEUTROPHILS # BLD AUTO: 3.1 10*3/MM3 (ref 1.7–7)
NEUTROPHILS NFR BLD AUTO: 54.4 % (ref 42.7–76)
NRBC BLD AUTO-RTO: 0 /100 WBC (ref 0–0.2)
PLATELET # BLD AUTO: 172 10*3/MM3 (ref 140–450)
POTASSIUM SERPL-SCNC: 4.4 MMOL/L (ref 3.5–5.2)
PROT SERPL-MCNC: 7.6 G/DL (ref 6–8.5)
PSA SERPL-MCNC: 0.49 NG/ML (ref 0–4)
RBC # BLD AUTO: 5.02 10*6/MM3 (ref 4.14–5.8)
SODIUM SERPL-SCNC: 139 MMOL/L (ref 136–145)
TRIGL SERPL-MCNC: 87 MG/DL (ref 0–150)
TSH SERPL DL<=0.005 MIU/L-ACNC: 4.41 UIU/ML (ref 0.27–4.2)
VIT B12 SERPL-MCNC: 554 PG/ML (ref 211–946)
VLDLC SERPL CALC-MCNC: 17.4 MG/DL
WBC # BLD AUTO: 5.7 10*3/MM3 (ref 3.4–10.8)

## 2019-12-05 PROCEDURE — G0277 HBOT, FULL BODY CHAMBER, 30M: HCPCS

## 2019-12-06 ENCOUNTER — OFFICE VISIT (OUTPATIENT)
Dept: WOUND CARE | Facility: HOSPITAL | Age: 59
End: 2019-12-06

## 2019-12-06 PROCEDURE — G0277 HBOT, FULL BODY CHAMBER, 30M: HCPCS

## 2019-12-09 ENCOUNTER — OFFICE VISIT (OUTPATIENT)
Dept: WOUND CARE | Facility: HOSPITAL | Age: 59
End: 2019-12-09

## 2019-12-09 PROCEDURE — G0277 HBOT, FULL BODY CHAMBER, 30M: HCPCS

## 2019-12-10 ENCOUNTER — OFFICE VISIT (OUTPATIENT)
Dept: WOUND CARE | Facility: HOSPITAL | Age: 59
End: 2019-12-10

## 2019-12-10 PROCEDURE — G0277 HBOT, FULL BODY CHAMBER, 30M: HCPCS

## 2019-12-11 ENCOUNTER — OFFICE VISIT (OUTPATIENT)
Dept: WOUND CARE | Facility: HOSPITAL | Age: 59
End: 2019-12-11

## 2019-12-11 ENCOUNTER — OFFICE VISIT (OUTPATIENT)
Dept: FAMILY MEDICINE CLINIC | Facility: CLINIC | Age: 59
End: 2019-12-11

## 2019-12-11 VITALS
HEART RATE: 96 BPM | HEIGHT: 71 IN | BODY MASS INDEX: 27.16 KG/M2 | OXYGEN SATURATION: 98 % | DIASTOLIC BLOOD PRESSURE: 72 MMHG | SYSTOLIC BLOOD PRESSURE: 116 MMHG | TEMPERATURE: 98.5 F | RESPIRATION RATE: 16 BRPM | WEIGHT: 194 LBS

## 2019-12-11 DIAGNOSIS — I49.8 OTHER CARDIAC ARRHYTHMIA: ICD-10-CM

## 2019-12-11 DIAGNOSIS — E78.2 MIXED HYPERLIPIDEMIA: Chronic | ICD-10-CM

## 2019-12-11 DIAGNOSIS — E11.628 DIABETIC FOOT INFECTION (HCC): ICD-10-CM

## 2019-12-11 DIAGNOSIS — I10 ESSENTIAL HYPERTENSION: Chronic | ICD-10-CM

## 2019-12-11 DIAGNOSIS — L08.9 DIABETIC FOOT INFECTION (HCC): ICD-10-CM

## 2019-12-11 DIAGNOSIS — Z23 NEEDS FLU SHOT: Primary | ICD-10-CM

## 2019-12-11 DIAGNOSIS — G62.9 NEUROPATHY: Chronic | ICD-10-CM

## 2019-12-11 DIAGNOSIS — Z00.00 WELLNESS EXAMINATION: ICD-10-CM

## 2019-12-11 DIAGNOSIS — E11.9 CONTROLLED TYPE 2 DIABETES MELLITUS WITHOUT COMPLICATION, WITHOUT LONG-TERM CURRENT USE OF INSULIN (HCC): Chronic | ICD-10-CM

## 2019-12-11 PROCEDURE — 99396 PREV VISIT EST AGE 40-64: CPT | Performed by: FAMILY MEDICINE

## 2019-12-11 PROCEDURE — 90471 IMMUNIZATION ADMIN: CPT | Performed by: FAMILY MEDICINE

## 2019-12-11 PROCEDURE — 90674 CCIIV4 VAC NO PRSV 0.5 ML IM: CPT | Performed by: FAMILY MEDICINE

## 2019-12-11 PROCEDURE — G0277 HBOT, FULL BODY CHAMBER, 30M: HCPCS

## 2019-12-11 RX ORDER — GLYBURIDE 5 MG/1
10 TABLET ORAL 2 TIMES DAILY WITH MEALS
Qty: 180 TABLET | Refills: 0
Start: 2019-12-11 | End: 2020-01-31

## 2019-12-11 RX ORDER — PENICILLIN V POTASSIUM 500 MG/1
500 TABLET ORAL 4 TIMES DAILY
COMMUNITY
End: 2020-12-23

## 2019-12-11 NOTE — PROGRESS NOTES
Subjective   Abdullahi Gomez is a 59 y.o. male presenting with chief complaint of:   Chief Complaint   Patient presents with   • Annual Exam       History of Present Illness :  Alone.       Has multiple chronic problems to consider that might have a bearing on today's issues; an interval appointment.       Chronic/acute problems reviewed today:   1. Needs flu shot: Chronic/ongoing need to review pro/cons for various vaccinations based on age, health issues.  Needs vaccination today for flu.     2. Diabetic foot infection (CMS/[facility]): chronic/variable.  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     3. Controlled type 2 diabetes mellitus without complication, without long-term current use of insulin (CMS/[facility]): Chronic/stable.   No problem/pattern hypoglycemia/hyperglycemia manifest by poly- dypsia, phagia, uria, or sweats, diaphoretic episodes, syncope/near.     4. Other cardiac arrhythmia: Chronic/stable.   History of benign ectopy/few PVCs.  Rhythm currently seems controlled.  Medications seem tolerated.  No syncope near syncope.     5. Mixed hyperlipidemia: Chronic/stable.  Tolerated use of Rx with labs showing improved lipid values and tolerant liver labs. No muscle aches unexpected.       6. Essential hypertension: Chronic/stable. Stable here/if home blood pressures.  No significant chest pain, SOB, LE edema, orthopnea, near syncope, dizziness/light headness.      7. Neuropathy-offered Rx: chronic/stable LE numbness.  Watches feet closely for sores.    8. Wellness examination-done: Chronic ongoing over time screening or advice for general health care/wellness.        Has an/another acute issue today: none.    The following portions of the patient's history were reviewed and updated as appropriate: allergies, current medications, past family history, past medical history, past social history, past  surgical history and problem list.      Current Outpatient Medications:   •  aspirin 81 MG EC tablet, Take 81 mg by mouth Daily., Disp: , Rfl:   •  colesevelam (WELCHOL) 625 MG tablet, Take 3 tablets by mouth 2 (Two) Times a Day With Meals., Disp: 540 tablet, Rfl: 3  •  Dulaglutide (TRULICITY) 1.5 MG/0.5ML solution pen-injector, Inject 1.5 mg under the skin into the appropriate area as directed 1 (One) Time Per Week., Disp: 12 pen, Rfl: 3  •  glyburide (DIAbeta) 5 MG tablet, Take 1 tablets by mouth 2 (Two) Times a Day With Meals., Disp: 180 tablet, Rfl: 0  •  JARDIANCE 25 MG tablet, TAKE 1 TABLET BY MOUTH DAILY, Disp: 90 tablet, Rfl: 0  •  lisinopril (PRINIVIL,ZESTRIL) 10 MG tablet, Take 1 tablet by mouth Daily., Disp: 90 tablet, Rfl: 3  •  metFORMIN (GLUCOPHAGE) 500 MG tablet, Take 2 tablets by mouth 2 (Two) Times a Day With Meals., Disp: 360 tablet, Rfl: 3  •  penicillin v potassium (VEETID) 500 MG tablet, Take 500 mg by mouth 4 (Four) Times a Day., Disp: , Rfl:   •  tadalafil (CIALIS) 20 MG tablet, Take 20 mg by mouth Daily As Needed for erectile dysfunction., Disp: , Rfl:     No problems with medications.  Refills if needed done    No Known Allergies    Review of Systems  GENERAL:  Active/slower with limits, speed, stamina for age.  Sleep is ok. No fever now.  ENDO:  No syncope, near or diaphoretic sweaty spells.  BS variable;  no download noted.  HEENT: No head injury or headache.   No vision change.  No significant hearing loss.  Ears without pain/drainage.  No sore throat.  No usual significant nasal/sinus congestion/drainage; worse couple weeks.  No epistaxis.  CHEST: No chest wall tenderness or mass. No usual significant cough, without wheeze.  No SOB; no hemoptysis.  CV: No chest pain, palpitations, ankle edema.  GI: No heartburn, dysphagia.  No abdominal pain, diarrhea, constipation.  No rectal bleeding, or melena.    :  Voids without dysuria, or incontinence to completion.   ORTHO: No painful/swollen  joints but various on /off sore.  No sore neck or back.  No acute neck or back pain without recent injury.   NEURO: No dizziness, weakness of extremities.  No change occ numbness/paresthesias.   Two weeks tics of L cheek.   PSYCH: No memory loss.  Mood good; not anxious, depressed   Screening:  Mammogram: NA  Bone density: NA  Low dose CT chest: Tobacco-smoker/never: NA  GI: Colon-nl/[facility]/6.12.17/10y  Prostate: 12.11.19 AUA 3/1  7.13.18  Usual lab order  6m CMP, A1c  12m CBC, CMP, A1c, LIPID, TSH, PSAs, B12, folate    Lab Results:  Results for orders placed or performed in visit on 12/04/19   Comprehensive Metabolic Panel   Result Value Ref Range    Glucose 78 65 - 99 mg/dL    BUN 20 6 - 20 mg/dL    Creatinine 1.28 (H) 0.76 - 1.27 mg/dL    eGFR Non African Am 58 (L) >60 mL/min/1.73    eGFR African Am 70 >60 mL/min/1.73    BUN/Creatinine Ratio 15.6 7.0 - 25.0    Sodium 139 136 - 145 mmol/L    Potassium 4.4 3.5 - 5.2 mmol/L    Chloride 99 98 - 107 mmol/L    Total CO2 28.4 22.0 - 29.0 mmol/L    Calcium 10.0 8.6 - 10.5 mg/dL    Total Protein 7.6 6.0 - 8.5 g/dL    Albumin 4.40 3.50 - 5.20 g/dL    Globulin 3.2 gm/dL    A/G Ratio 1.4 g/dL    Total Bilirubin 0.3 0.2 - 1.2 mg/dL    Alkaline Phosphatase 117 39 - 117 U/L    AST (SGOT) 16 1 - 40 U/L    ALT (SGPT) 13 1 - 41 U/L   Hemoglobin A1c   Result Value Ref Range    Hemoglobin A1C 8.00 (H) 4.80 - 5.60 %   Lipid Panel   Result Value Ref Range    Total Cholesterol 146 0 - 200 mg/dL    Triglycerides 87 0 - 150 mg/dL    HDL Cholesterol 42 40 - 60 mg/dL    VLDL Cholesterol 17.4 mg/dL    LDL Cholesterol  87 0 - 100 mg/dL   TSH   Result Value Ref Range    TSH 4.410 (H) 0.270 - 4.200 uIU/mL   PSA Screen   Result Value Ref Range    PSA 0.488 0.000 - 4.000 ng/mL   Vitamin B12   Result Value Ref Range    Vitamin B-12 554 211 - 946 pg/mL   Folate   Result Value Ref Range    Folate 10.90 4.78 - 24.20 ng/mL   MicroAlbumin, Urine, Random - Urine, Clean Catch   Result Value Ref  Range    Microalbumin, Urine 9.5 Not Estab. ug/mL   CBC & Differential   Result Value Ref Range    WBC 5.70 3.40 - 10.80 10*3/mm3    RBC 5.02 4.14 - 5.80 10*6/mm3    Hemoglobin 15.2 13.0 - 17.7 g/dL    Hematocrit 44.7 37.5 - 51.0 %    MCV 89.0 79.0 - 97.0 fL    MCH 30.3 26.6 - 33.0 pg    MCHC 34.0 31.5 - 35.7 g/dL    RDW 13.4 12.3 - 15.4 %    Platelets 172 140 - 450 10*3/mm3    Neutrophil Rel % 54.4 42.7 - 76.0 %    Lymphocyte Rel % 29.8 19.6 - 45.3 %    Monocyte Rel % 11.4 5.0 - 12.0 %    Eosinophil Rel % 3.5 0.3 - 6.2 %    Basophil Rel % 0.5 0.0 - 1.5 %    Neutrophils Absolute 3.10 1.70 - 7.00 10*3/mm3    Lymphocytes Absolute 1.70 0.70 - 3.10 10*3/mm3    Monocytes Absolute 0.65 0.10 - 0.90 10*3/mm3    Eosinophils Absolute 0.20 0.00 - 0.40 10*3/mm3    Basophils Absolute 0.03 0.00 - 0.20 10*3/mm3    Immature Granulocyte Rel % 0.4 0.0 - 0.5 %    Immature Grans Absolute 0.02 0.00 - 0.05 10*3/mm3    nRBC 0.0 0.0 - 0.2 /100 WBC       A1C:  Lab Results - Last 18 Months   Lab Units 12/04/19  0719 10/28/19  0813 10/14/19  0943 10/07/19  0905 09/23/19  0915 05/15/19  0746 10/31/18  0712   HEMOGLOBIN A1C % 8.00* 7.40* 7.00* 7.10* 7.60* 9.30* 8.70     PSA:  Lab Results - Last 18 Months   Lab Units 12/04/19  0719 07/11/18  0832   PSA ng/mL 0.488 0.518     CBC:  Lab Results - Last 18 Months   Lab Units 12/04/19  0719 10/28/19  0813 10/21/19  0921 10/14/19  0943 10/07/19  0905 09/30/19  0858 09/23/19  0915   WBC 10*3/mm3 5.70 5.33 4.98 6.36 5.31 4.81 3.49   HEMOGLOBIN g/dL 15.2 14.1 14.3 15.1 14.5 14.0 13.6   HEMATOCRIT % 44.7 41.1 41.1 44.2 42.1 41.3 39.6   PLATELETS 10*3/mm3 172 155 172 199 186 204 222      BMP/CMP:  Lab Results - Last 18 Months   Lab Units 12/04/19  0719 10/28/19  0813 10/21/19  0921 10/14/19  0943 10/07/19  0905 09/30/19  0858 09/23/19  0915  05/23/19  0724 05/15/19  0746  10/31/18  0712 07/11/18  0832   SODIUM mmol/L 139 135* 138 139 137 138 138   < > 135* 137  --  140 138   POTASSIUM mmol/L 4.4 4.7 3.9 4.9  "5.2 4.1 4.2   < > 4.9 5.0  --  5.1 4.4   CHLORIDE mmol/L 99 97* 99 100 99 98 103   < > 98 98  --  97* 97*   TOTAL CO2 mmol/L 28.4  --   --   --   --   --   --   --  26.8 25.2  --  31.0 28.0   CO2 mmol/L  --  27.0 27.0 25.0 29.0 29.0 27.0   < >  --   --   --   --   --    GLUCOSE mg/dL 78  --   --   --   --   --   --   --  132* 114*  --  108* 102*   BUN mg/dL 20 32* 12 29* 26* 17 12   < > 16 20  --  12 11   CREATININE mg/dL 1.28* 1.01 0.90 0.99 0.91 1.02 1.10   < > 1.28* 1.41*  --  1.13 1.00   EGFR IF NONAFRICN AM mL/min/1.73 58* 76 87 78 86 75 69   < > 58* 52*  --  67 78   EGFR IF AFRICN AM mL/min/1.73 70  --   --   --   --   --   --   --  70 63  --  81 94   CALCIUM mg/dL 10.0 9.1 8.4* 9.8 9.2 9.5 8.8   < > 10.3 10.4   < > 11.3* 10.9*    < > = values in this interval not displayed.     HEPATIC:  Lab Results - Last 18 Months   Lab Units 12/04/19  0719 10/28/19  0813 10/21/19  0921 10/14/19  0943 10/07/19  0905 09/30/19  0858 09/23/19  0915   ALT (SGPT) U/L 13 20 20 17 15 16 15   AST (SGOT) U/L 16 18 20 19 20 25 20   ALK PHOS U/L 117 100 108 125* 114 111 100     THYROID:  Lab Results - Last 18 Months   Lab Units 12/04/19  0719 07/11/18  0832   TSH uIU/mL 4.410* 1.680       Objective   /72   Pulse 96   Temp 98.5 °F (36.9 °C)   Resp 16   Ht 180.3 cm (71\")   Wt 88 kg (194 lb)   SpO2 98%   BMI 27.06 kg/m²   Body mass index is 27.06 kg/m².    Physical Exam  GENERAL:  Well nourished/developed in no acute distress. Obese   SKIN: Turgor excellent, without wound, rash, lesion other than wrapped mid L foot; dressing dry.   HEENT: Normal cephalic without trauma.  Pupils equal round reactive to light. Extraocular motions full without nystagmus.   External canals nonobstructive nontender without reddness. Tymphatic membranes vale with ottoniel structures intact.   Oral cavity without growths, exudates, and moist.  Posterior pharynx without mass, obstruction, redness.  No thyromegaly, mass, tenderness, lymphadenopathy and " supple.  CV: Regular rhythm.  No murmur, gallop,  edema. Posterior pulses intact.  No carotid bruits.  CHEST: No chest wall tenderness or mass.   LUNGS: Symmetric motion with clear to auscultation.   ABD: Soft, nontender without mass.    PROSTATE: No visible/palpable lesion/tenderness and anal tone intact/full.  Prostate 20 gm/smooth and nontender.  No rectal mass within reach. Stool on glove brown.   ORTHO: Symmetric extremities without swelling/point tenderness.  Full gross range of motion.  NEURO: CN 2-12 grossly intact.  Symmetric facies. 1/4 x bicep knee equal reflexes.  UE/LE   3-4/5 strength throughout.  Nonfocal use extremities. Speech clear.  Reduced light touch with monofilament, vibratory sensation with tuning fork; equal toes/distal feet.    PSYCH: Oriented x 3.  Pleasant calm, well kept.  Purposeful/directed conservation with intact short/long gross memory    Assessment/Plan     1. Needs flu shot    2. Diabetic foot infection (CMS/[facility])    3. Controlled type 2 diabetes mellitus without complication, without long-term current use of insulin (CMS/[facility])    4. Other cardiac arrhythmia    5. Mixed hyperlipidemia    6. Essential hypertension    7. Neuropathy-offered Rx    8. Wellness examination-done        Rx: reviewed/changes:  New Medications Ordered This Visit   Medications   • glyburide (DIAbeta) 5 MG tablet     Sig: Take 2 tablets by mouth 2 (Two) Times a Day With Meals.     Dispense:  180 tablet     Refill:  0     LAB/Testing/Referrals: reviewed/orders:   Today:   Orders Placed This Encounter   Procedures   • Flucelvax Quad (Vial) =>4 yrs ([id number])     Chronic/recurrent labs above or change to:   same    Discussions:   Increase glybride; may not work  A1c needs lower  May need insulin  Continue care with [facility]/wound care  Body mass index is 27.06 kg/m².  Patient's Body mass index is 27.06 kg/m². BMI is within normal parameters. No follow-up required..      Tobacco use reviewed:    Non-smoker    Patient  Instructions   Keep us informed on your working with your sugar.    Goal is to get A1c down BUT not influence finishing your treatments or causing excessive low sugars  BUT if increase in Rx today does nothing; let us know.       Follow up: Return for lab 6m THEN lab/Dr Gandhi 12m.  Future Appointments   Date Time Provider Department Center   12/12/2019  8:00 AM HYPERBARIC 2 PAD WOUND CARE [facility]   6/16/2020  8:10 AM LAB PC DO [facility] None   12/21/2020  8:10 AM LAB PC DO [facility] None   12/23/2020  9:00 AM Davi Gandhi MD [facility] None

## 2019-12-11 NOTE — PROGRESS NOTES
Pt here for Flu shot injection as ordered. Given in left deltoid. Pt tolerated well and no adverse reactions noted and VIS was given prior to the injection and pt left office.

## 2019-12-12 ENCOUNTER — OFFICE VISIT (OUTPATIENT)
Dept: WOUND CARE | Facility: HOSPITAL | Age: 59
End: 2019-12-12

## 2019-12-12 PROCEDURE — G0277 HBOT, FULL BODY CHAMBER, 30M: HCPCS

## 2019-12-13 ENCOUNTER — OFFICE VISIT (OUTPATIENT)
Dept: WOUND CARE | Facility: HOSPITAL | Age: 59
End: 2019-12-13

## 2019-12-13 PROCEDURE — G0277 HBOT, FULL BODY CHAMBER, 30M: HCPCS

## 2019-12-16 ENCOUNTER — OFFICE VISIT (OUTPATIENT)
Dept: WOUND CARE | Facility: HOSPITAL | Age: 59
End: 2019-12-16

## 2019-12-16 PROCEDURE — G0277 HBOT, FULL BODY CHAMBER, 30M: HCPCS

## 2019-12-17 ENCOUNTER — OFFICE VISIT (OUTPATIENT)
Dept: WOUND CARE | Facility: HOSPITAL | Age: 59
End: 2019-12-17

## 2019-12-17 PROCEDURE — G0277 HBOT, FULL BODY CHAMBER, 30M: HCPCS

## 2019-12-18 ENCOUNTER — OFFICE VISIT (OUTPATIENT)
Dept: WOUND CARE | Facility: HOSPITAL | Age: 59
End: 2019-12-18

## 2019-12-18 PROCEDURE — G0277 HBOT, FULL BODY CHAMBER, 30M: HCPCS

## 2019-12-19 ENCOUNTER — OFFICE VISIT (OUTPATIENT)
Dept: WOUND CARE | Facility: HOSPITAL | Age: 59
End: 2019-12-19

## 2019-12-19 PROCEDURE — G0277 HBOT, FULL BODY CHAMBER, 30M: HCPCS

## 2019-12-20 ENCOUNTER — OFFICE VISIT (OUTPATIENT)
Dept: WOUND CARE | Facility: HOSPITAL | Age: 59
End: 2019-12-20

## 2019-12-20 PROCEDURE — G0277 HBOT, FULL BODY CHAMBER, 30M: HCPCS

## 2019-12-23 ENCOUNTER — OFFICE VISIT (OUTPATIENT)
Dept: WOUND CARE | Facility: HOSPITAL | Age: 59
End: 2019-12-23

## 2019-12-23 PROCEDURE — G0277 HBOT, FULL BODY CHAMBER, 30M: HCPCS

## 2019-12-26 ENCOUNTER — OFFICE VISIT (OUTPATIENT)
Dept: WOUND CARE | Facility: HOSPITAL | Age: 59
End: 2019-12-26

## 2019-12-26 PROCEDURE — G0277 HBOT, FULL BODY CHAMBER, 30M: HCPCS

## 2019-12-27 ENCOUNTER — OFFICE VISIT (OUTPATIENT)
Dept: WOUND CARE | Facility: HOSPITAL | Age: 59
End: 2019-12-27

## 2019-12-27 PROCEDURE — G0277 HBOT, FULL BODY CHAMBER, 30M: HCPCS

## 2019-12-30 ENCOUNTER — OFFICE VISIT (OUTPATIENT)
Dept: WOUND CARE | Facility: HOSPITAL | Age: 59
End: 2019-12-30

## 2019-12-30 PROCEDURE — G0277 HBOT, FULL BODY CHAMBER, 30M: HCPCS

## 2019-12-31 ENCOUNTER — OFFICE VISIT (OUTPATIENT)
Dept: WOUND CARE | Facility: HOSPITAL | Age: 59
End: 2019-12-31

## 2019-12-31 PROCEDURE — G0277 HBOT, FULL BODY CHAMBER, 30M: HCPCS

## 2020-01-10 ENCOUNTER — OFFICE VISIT (OUTPATIENT)
Dept: WOUND CARE | Facility: HOSPITAL | Age: 60
End: 2020-01-10

## 2020-01-17 ENCOUNTER — OFFICE VISIT (OUTPATIENT)
Dept: WOUND CARE | Facility: HOSPITAL | Age: 60
End: 2020-01-17

## 2020-01-24 ENCOUNTER — OFFICE VISIT (OUTPATIENT)
Dept: WOUND CARE | Facility: HOSPITAL | Age: 60
End: 2020-01-24

## 2020-01-24 DIAGNOSIS — E11.65 UNCONTROLLED TYPE 2 DIABETES MELLITUS WITH HYPERGLYCEMIA (HCC): ICD-10-CM

## 2020-01-24 RX ORDER — EMPAGLIFLOZIN 25 MG/1
TABLET, FILM COATED ORAL
Qty: 90 TABLET | Refills: 2 | Status: SHIPPED | OUTPATIENT
Start: 2020-01-24 | End: 2021-02-09 | Stop reason: SDUPTHER

## 2020-01-31 ENCOUNTER — OFFICE VISIT (OUTPATIENT)
Dept: WOUND CARE | Facility: HOSPITAL | Age: 60
End: 2020-01-31

## 2020-01-31 ENCOUNTER — HOSPITAL ENCOUNTER (OUTPATIENT)
Dept: GENERAL RADIOLOGY | Facility: HOSPITAL | Age: 60
Discharge: HOME | End: 2020-01-31
Admitting: PODIATRIST

## 2020-01-31 ENCOUNTER — TRANSCRIBE ORDERS (OUTPATIENT)
Dept: ADMINISTRATIVE | Facility: HOSPITAL | Age: 60
End: 2020-01-31

## 2020-01-31 DIAGNOSIS — M89.472: ICD-10-CM

## 2020-01-31 DIAGNOSIS — E11.9 CONTROLLED TYPE 2 DIABETES MELLITUS WITHOUT COMPLICATION, WITHOUT LONG-TERM CURRENT USE OF INSULIN (HCC): Chronic | ICD-10-CM

## 2020-01-31 DIAGNOSIS — M89.472: Primary | ICD-10-CM

## 2020-01-31 PROCEDURE — 73630 X-RAY EXAM OF FOOT: CPT

## 2020-01-31 PROCEDURE — 88311 DECALCIFY TISSUE: CPT | Performed by: PODIATRIST

## 2020-01-31 PROCEDURE — 88307 TISSUE EXAM BY PATHOLOGIST: CPT | Performed by: PODIATRIST

## 2020-01-31 RX ORDER — GLYBURIDE 5 MG/1
TABLET ORAL
Qty: 180 TABLET | Refills: 0 | Status: SHIPPED | OUTPATIENT
Start: 2020-01-31 | End: 2020-06-19

## 2020-02-03 ENCOUNTER — LAB REQUISITION (OUTPATIENT)
Dept: LAB | Facility: HOSPITAL | Age: 60
End: 2020-02-03

## 2020-02-03 DIAGNOSIS — Z00.00 ENCOUNTER FOR GENERAL ADULT MEDICAL EXAMINATION WITHOUT ABNORMAL FINDINGS: ICD-10-CM

## 2020-02-07 ENCOUNTER — APPOINTMENT (OUTPATIENT)
Dept: WOUND CARE | Facility: HOSPITAL | Age: 60
End: 2020-02-07

## 2020-02-07 ENCOUNTER — LAB REQUISITION (OUTPATIENT)
Dept: LAB | Facility: HOSPITAL | Age: 60
End: 2020-02-07

## 2020-02-07 DIAGNOSIS — Z00.00 ENCOUNTER FOR GENERAL ADULT MEDICAL EXAMINATION WITHOUT ABNORMAL FINDINGS: ICD-10-CM

## 2020-02-07 PROCEDURE — 87075 CULTR BACTERIA EXCEPT BLOOD: CPT | Performed by: PODIATRIST

## 2020-02-07 PROCEDURE — 87077 CULTURE AEROBIC IDENTIFY: CPT | Performed by: PODIATRIST

## 2020-02-07 PROCEDURE — 87205 SMEAR GRAM STAIN: CPT | Performed by: PODIATRIST

## 2020-02-07 PROCEDURE — 87176 TISSUE HOMOGENIZATION CULTR: CPT | Performed by: PODIATRIST

## 2020-02-07 PROCEDURE — 87070 CULTURE OTHR SPECIMN AEROBIC: CPT | Performed by: PODIATRIST

## 2020-02-07 PROCEDURE — 87186 SC STD MICRODIL/AGAR DIL: CPT | Performed by: PODIATRIST

## 2020-02-10 LAB
BACTERIA SPEC AEROBE CULT: ABNORMAL
GRAM STN SPEC: ABNORMAL
GRAM STN SPEC: ABNORMAL

## 2020-02-13 LAB — BACTERIA SPEC ANAEROBE CULT: NORMAL

## 2020-02-14 ENCOUNTER — OFFICE VISIT (OUTPATIENT)
Dept: WOUND CARE | Facility: HOSPITAL | Age: 60
End: 2020-02-14

## 2020-02-20 ENCOUNTER — OFFICE VISIT (OUTPATIENT)
Dept: WOUND CARE | Facility: HOSPITAL | Age: 60
End: 2020-02-20

## 2020-02-27 ENCOUNTER — OFFICE VISIT (OUTPATIENT)
Dept: WOUND CARE | Facility: HOSPITAL | Age: 60
End: 2020-02-27

## 2020-03-03 ENCOUNTER — OFFICE VISIT (OUTPATIENT)
Dept: WOUND CARE | Facility: HOSPITAL | Age: 60
End: 2020-03-03

## 2020-03-10 ENCOUNTER — OFFICE VISIT (OUTPATIENT)
Dept: WOUND CARE | Facility: HOSPITAL | Age: 60
End: 2020-03-10

## 2020-03-16 ENCOUNTER — OFFICE VISIT (OUTPATIENT)
Dept: WOUND CARE | Facility: HOSPITAL | Age: 60
End: 2020-03-16

## 2020-03-16 PROCEDURE — G0463 HOSPITAL OUTPT CLINIC VISIT: HCPCS

## 2020-06-12 DIAGNOSIS — E11.9 CONTROLLED TYPE 2 DIABETES MELLITUS WITHOUT COMPLICATION, WITHOUT LONG-TERM CURRENT USE OF INSULIN (HCC): Primary | ICD-10-CM

## 2020-06-12 DIAGNOSIS — I10 ESSENTIAL HYPERTENSION: ICD-10-CM

## 2020-06-12 DIAGNOSIS — E83.52 HYPERCALCEMIA: ICD-10-CM

## 2020-06-16 ENCOUNTER — RESULTS ENCOUNTER (OUTPATIENT)
Dept: FAMILY MEDICINE CLINIC | Facility: CLINIC | Age: 60
End: 2020-06-16

## 2020-06-16 ENCOUNTER — LAB (OUTPATIENT)
Dept: FAMILY MEDICINE CLINIC | Facility: CLINIC | Age: 60
End: 2020-06-16

## 2020-06-16 DIAGNOSIS — E83.52 HYPERCALCEMIA: ICD-10-CM

## 2020-06-16 DIAGNOSIS — E11.9 CONTROLLED TYPE 2 DIABETES MELLITUS WITHOUT COMPLICATION, WITHOUT LONG-TERM CURRENT USE OF INSULIN (HCC): ICD-10-CM

## 2020-06-16 DIAGNOSIS — I10 ESSENTIAL HYPERTENSION: ICD-10-CM

## 2020-06-17 LAB
ALBUMIN SERPL-MCNC: 4.3 G/DL (ref 3.5–5.2)
ALBUMIN/GLOB SERPL: 1.6 G/DL
ALP SERPL-CCNC: 85 U/L (ref 39–117)
ALT SERPL-CCNC: 10 U/L (ref 1–41)
AST SERPL-CCNC: 12 U/L (ref 1–40)
BILIRUB SERPL-MCNC: 0.6 MG/DL (ref 0.2–1.2)
BUN SERPL-MCNC: 12 MG/DL (ref 6–20)
BUN/CREAT SERPL: 9.8 (ref 7–25)
CALCIUM SERPL-MCNC: 9 MG/DL (ref 8.6–10.5)
CHLORIDE SERPL-SCNC: 102 MMOL/L (ref 98–107)
CO2 SERPL-SCNC: 25.4 MMOL/L (ref 22–29)
CREAT SERPL-MCNC: 1.23 MG/DL (ref 0.76–1.27)
GLOBULIN SER CALC-MCNC: 2.7 GM/DL
GLUCOSE SERPL-MCNC: 130 MG/DL (ref 65–99)
HBA1C MFR BLD: 9.5 % (ref 4.8–5.6)
POTASSIUM SERPL-SCNC: 3.9 MMOL/L (ref 3.5–5.2)
PROT SERPL-MCNC: 7 G/DL (ref 6–8.5)
SODIUM SERPL-SCNC: 135 MMOL/L (ref 136–145)

## 2020-06-19 DIAGNOSIS — E11.9 CONTROLLED TYPE 2 DIABETES MELLITUS WITHOUT COMPLICATION, WITHOUT LONG-TERM CURRENT USE OF INSULIN (HCC): Chronic | ICD-10-CM

## 2020-06-19 DIAGNOSIS — E11.65 UNCONTROLLED TYPE 2 DIABETES MELLITUS WITH HYPERGLYCEMIA (HCC): ICD-10-CM

## 2020-06-19 RX ORDER — GLYBURIDE 5 MG/1
TABLET ORAL
Qty: 180 TABLET | Refills: 1 | Status: SHIPPED | OUTPATIENT
Start: 2020-06-19 | End: 2020-09-08 | Stop reason: SDUPTHER

## 2020-06-19 RX ORDER — DULAGLUTIDE 1.5 MG/.5ML
INJECTION, SOLUTION SUBCUTANEOUS
Qty: 6 ML | Refills: 3 | Status: SHIPPED | OUTPATIENT
Start: 2020-06-19 | End: 2020-12-25 | Stop reason: DRUGHIGH

## 2020-08-13 DIAGNOSIS — E11.65 UNCONTROLLED TYPE 2 DIABETES MELLITUS WITH HYPERGLYCEMIA (HCC): Primary | ICD-10-CM

## 2020-08-14 ENCOUNTER — LAB (OUTPATIENT)
Dept: FAMILY MEDICINE CLINIC | Facility: CLINIC | Age: 60
End: 2020-08-14

## 2020-08-14 ENCOUNTER — RESULTS ENCOUNTER (OUTPATIENT)
Dept: FAMILY MEDICINE CLINIC | Facility: CLINIC | Age: 60
End: 2020-08-14

## 2020-08-14 DIAGNOSIS — E11.65 UNCONTROLLED TYPE 2 DIABETES MELLITUS WITH HYPERGLYCEMIA (HCC): ICD-10-CM

## 2020-08-15 LAB — HBA1C MFR BLD: 7.9 % (ref 4.8–5.6)

## 2020-08-28 RX ORDER — COLESEVELAM 180 1/1
TABLET ORAL
Qty: 540 TABLET | Refills: 1 | Status: SHIPPED | OUTPATIENT
Start: 2020-08-28 | End: 2021-06-17

## 2020-09-08 DIAGNOSIS — E11.9 CONTROLLED TYPE 2 DIABETES MELLITUS WITHOUT COMPLICATION, WITHOUT LONG-TERM CURRENT USE OF INSULIN (HCC): Chronic | ICD-10-CM

## 2020-09-08 RX ORDER — GLYBURIDE 5 MG/1
10 TABLET ORAL 2 TIMES DAILY WITH MEALS
Qty: 360 TABLET | Refills: 1 | Status: SHIPPED | OUTPATIENT
Start: 2020-09-08 | End: 2021-06-23

## 2020-09-08 NOTE — TELEPHONE ENCOUNTER
Patient called in stating he had discussed some med changes with Dr. Gandhi at his last visit. He states he was advised per Dr. Gandhi to start taking his glyburide (DIAbeta) 5 MG tablet medication twice in the morning and twice in the evening, to where he has been taking more and is now almost out. Patient is requesting a refill on his glyburide (DIAbeta) 5 MG tablet medication and would like for [facility] to be [id number] - [place] [address] AT [employer] [facility] [address] [facility] - [phone] [facility] [phone] FX. Patient is also requesting the directions also be updated so that the bottle shows the correct directions. Please advise.

## 2020-12-03 ENCOUNTER — TELEPHONE (OUTPATIENT)
Dept: FAMILY MEDICINE CLINIC | Facility: CLINIC | Age: 60
End: 2020-12-03

## 2020-12-04 ENCOUNTER — CLINICAL SUPPORT (OUTPATIENT)
Dept: FAMILY MEDICINE CLINIC | Facility: CLINIC | Age: 60
End: 2020-12-04

## 2020-12-04 VITALS — TEMPERATURE: 97.8 F | HEART RATE: 88 BPM | OXYGEN SATURATION: 99 %

## 2020-12-04 DIAGNOSIS — Z20.822 CLOSE EXPOSURE TO COVID-19 VIRUS: Primary | ICD-10-CM

## 2020-12-04 DIAGNOSIS — R50.9 FEVER, UNSPECIFIED FEVER CAUSE: ICD-10-CM

## 2020-12-04 PROCEDURE — 99000 SPECIMEN HANDLING OFFICE-LAB: CPT | Performed by: FAMILY MEDICINE

## 2020-12-04 NOTE — TELEPHONE ENCOUNTER
A telephone visit with his wife it was noted that she is positive and he is running a temp today 12 3.  He agrees to quarantine and come to the office tomorrow 11-12 for testing; diagnosis fever and exposure to Covid

## 2020-12-06 LAB — SARS-COV-2 RNA RESP QL NAA+PROBE: DETECTED

## 2020-12-07 ENCOUNTER — TELEMEDICINE (OUTPATIENT)
Dept: FAMILY MEDICINE CLINIC | Facility: CLINIC | Age: 60
End: 2020-12-07

## 2020-12-07 DIAGNOSIS — U07.1 COVID-19 VIRUS DETECTED: ICD-10-CM

## 2020-12-07 PROCEDURE — 99442 PR PHYS/QHP TELEPHONE EVALUATION 11-20 MIN: CPT | Performed by: FAMILY MEDICINE

## 2020-12-07 NOTE — PROGRESS NOTES
Subjective   Abdullahi Gomez is a 60 y.o. male presenting with chief complaint of:   Chief Complaint   Patient presents with   • Covid-19 Home Monitoring Phone Call   You have chosen to receive care through a telephone visit. Do you consent to use a telephone visit for your medical care today? Yes  This visit has been rescheduled as a phone visit to comply with patient safety concerns in accordance with CDC recommendations. Total time of discussion was 13 minutes.      History of Present Illness :  Alone.  Here for primarily an acute issue today; COVID +.       Lives [facility]  Exposure person: wife  Exposure date: ? (day ?)  Exposure place: home  Exposure without mast  Length of exposure: continuous    Date of first symptom: 12.4.20 (day 3)    Date of test: 12.4.20     [facility]       Advice: self isolate (stay in a bedroom by yourself; open window some if able; food brought to you; dont join the family meals, TV, etc.  Advice: social distancing (masks, 6 ft distance, < 15m/24 hr exposure to others).  Avoid unnecessary outings/gathering consistent with state/health department guidance-later  Work: NA; work note will be written as needed by office  School: NA; video if an option    ER if any worsening especially SOB; as soon as SOB  Health department will be notified if they are positive; they should hear from them at sometime  Contacts should be tested if/when patient test returns positive; due to volume best patient call friends, family, contacts if able (health department may be slow at doing this)    Test back: pending 12.7.20   Test result pending +    Return to social distancing dates below  10 days from onset symptoms and no symptoms especially temp > 100.4 ([id number])  14 days from firm exposure (NA)  Do not rely on disease/antibodies for protection for infection future  Stay abreast for future recommendations for vaccination (even if had disease)    Has multiple chronic problems  to consider that might have a bearing on today's issues; not an interval appointment.       Chronic/acute problems reviewed today:   1. COVID-19 virus detected      Has an/another acute issue today: none.    The following portions of the patient's history were reviewed and updated as appropriate: allergies, current medications, past family history, past medical history, past social history, past surgical history and problem list.      Current Outpatient Medications:   •  aspirin 81 MG EC tablet, Take 81 mg by mouth Daily., Disp: , Rfl:   •  colesevelam (WELCHOL) 625 MG tablet, TAKE 3 TABLETS BY MOUTH TWICE DAILY WITH MEALS, Disp: 540 tablet, Rfl: 1  •  glyburide (DIAbeta) 5 MG tablet, Take 2 tablets by mouth 2 (Two) Times a Day With Meals., Disp: 360 tablet, Rfl: 1  •  JARDIANCE 25 MG tablet, TAKE 1 TABLET BY MOUTH DAILY, Disp: 90 tablet, Rfl: 2  •  lisinopril (PRINIVIL,ZESTRIL) 10 MG tablet, Take 1 tablet by mouth Daily., Disp: 90 tablet, Rfl: 3  •  metFORMIN (GLUCOPHAGE) 500 MG tablet, Take 2 tablets by mouth 2 (Two) Times a Day With Meals., Disp: 360 tablet, Rfl: 3  •  penicillin v potassium (VEETID) 500 MG tablet, Take 500 mg by mouth 4 (Four) Times a Day., Disp: , Rfl:   •  tadalafil (CIALIS) 20 MG tablet, Take 20 mg by mouth Daily As Needed for erectile dysfunction., Disp: , Rfl:   •  TRULICITY 1.5 MG/0.5ML solution pen-injector, INJECT 0.5ML (=1.5 MG)      SUBCUTANEOUSLY (UNDER THE SKIN) INTO THE APPROPRIATE AREA ONCE WEEKLY AS DIRECTED, Disp: 6 mL, Rfl: 3    No problems with medications.  Refills if needed done    No Known Allergies    Review of Systems  Symptoms positive: fatigue, temp > 100.4, headache, sore throat, cough (dry-productive),  Nausea, diarrhea  Symptoms neg:  Wheeze, SOB, diarrhea, loss taste, loss smell,    Lab Results:  Results for orders placed or performed in visit on 08/14/20   Hemoglobin A1c    Specimen: Blood   Result Value Ref Range    Hemoglobin A1C 7.90 (H) 4.80 - 5.60 %        A1C:  Lab Results - Last 18 Months   Lab Units 08/14/20  0707 06/16/20  0707 12/04/19  0719 10/28/19  0813 10/14/19  0943 10/07/19  0905 09/23/19  0915   HEMOGLOBIN A1C % 7.90* 9.50* 8.00* 7.40* 7.00* 7.10* 7.60*     PSA:  Lab Results - Last 18 Months   Lab Units 12/04/19  0719   PSA ng/mL 0.488     CBC:  Lab Results - Last 18 Months   Lab Units 12/04/19  0719 10/28/19  0813 10/21/19  0921 10/14/19  0943 10/07/19  0905 09/30/19  0858 09/23/19  0915   WBC 10*3/mm3 5.70 5.33 4.98 6.36 5.31 4.81 3.49   HEMOGLOBIN g/dL 15.2 14.1 14.3 15.1 14.5 14.0 13.6   HEMATOCRIT % 44.7 41.1 41.1 44.2 42.1 41.3 39.6   PLATELETS 10*3/mm3 172 155 172 199 186 204 222      BMP/CMP:  Lab Results - Last 18 Months   Lab Units 06/16/20  0707 12/04/19  0719 10/28/19  0813 10/21/19  0921 10/14/19  0943 10/07/19  0905 09/30/19  0858   SODIUM mmol/L 135* 139 135* 138 139 137 138   POTASSIUM mmol/L 3.9 4.4 4.7 3.9 4.9 5.2 4.1   CHLORIDE mmol/L 102 99 97* 99 100 99 98   TOTAL CO2 mmol/L 25.4 28.4  --   --   --   --   --    CO2 mmol/L  --   --  27.0 27.0 25.0 29.0 29.0   GLUCOSE mg/dL 130* 78  --   --   --   --   --    BUN mg/dL 12 20 32* 12 29* 26* 17   CREATININE mg/dL 1.23 1.28* 1.01 0.90 0.99 0.91 1.02   EGFR IF NONAFRICN AM mL/min/1.73 61 58* 76 87 78 86 75   EGFR IF AFRICN AM mL/min/1.73 74 70  --   --   --   --   --    CALCIUM mg/dL 9.0 10.0 9.1 8.4* 9.8 9.2 9.5     HEPATIC:  Lab Results - Last 18 Months   Lab Units 06/16/20  0707 12/04/19  0719 10/28/19  0813 10/21/19  0921 10/14/19  0943 10/07/19  0905 09/30/19  0858   ALT (SGPT) U/L 10 13 20 20 17 15 16   AST (SGOT) U/L 12 16 18 20 19 20 25   ALK PHOS U/L 85 117 100 108 125* 114 111     THYROID:  Lab Results - Last 18 Months   Lab Units 12/04/19  0719   TSH uIU/mL 4.410*       Objective   None; telephone    Physical Exam  None; due to telephone/COVID19  Was alert, oriented x 3, pleasant.     Assessment/Plan     1. COVID-19 virus detected        Rx: reviewed/changes:  No orders of  the defined types were placed in this encounter.      LAB/Testing/Referrals: reviewed/orders:   Today:   No orders of the defined types were placed in this encounter.    Chronic/recurrent labs above or change to:   same     Discussions:   above      There are no Patient Instructions on file for this visit.    Follow up: Return for lab;, Dr Gandhi-, as planned;.  Future Appointments   Date Time Provider Department Center   12/21/2020  8:10 AM LAB [facility]   12/23/2020  9:00 AM Davi Gandhi MD [facility]

## 2020-12-21 ENCOUNTER — LAB (OUTPATIENT)
Dept: FAMILY MEDICINE CLINIC | Facility: CLINIC | Age: 60
End: 2020-12-21

## 2020-12-21 ENCOUNTER — RESULTS ENCOUNTER (OUTPATIENT)
Dept: FAMILY MEDICINE CLINIC | Facility: CLINIC | Age: 60
End: 2020-12-21

## 2020-12-21 DIAGNOSIS — Z00.00 WELLNESS EXAMINATION: ICD-10-CM

## 2020-12-21 DIAGNOSIS — E78.2 MIXED HYPERLIPIDEMIA: Chronic | ICD-10-CM

## 2020-12-21 DIAGNOSIS — I10 ESSENTIAL HYPERTENSION: Chronic | ICD-10-CM

## 2020-12-21 DIAGNOSIS — E11.65 UNCONTROLLED TYPE 2 DIABETES MELLITUS WITH HYPERGLYCEMIA (HCC): ICD-10-CM

## 2020-12-21 DIAGNOSIS — I10 ESSENTIAL HYPERTENSION: Primary | Chronic | ICD-10-CM

## 2020-12-23 ENCOUNTER — OFFICE VISIT (OUTPATIENT)
Dept: FAMILY MEDICINE CLINIC | Facility: CLINIC | Age: 60
End: 2020-12-23

## 2020-12-23 VITALS
HEIGHT: 71 IN | SYSTOLIC BLOOD PRESSURE: 98 MMHG | BODY MASS INDEX: 28.34 KG/M2 | RESPIRATION RATE: 18 BRPM | TEMPERATURE: 97.5 F | OXYGEN SATURATION: 98 % | DIASTOLIC BLOOD PRESSURE: 60 MMHG | WEIGHT: 202.4 LBS | HEART RATE: 80 BPM

## 2020-12-23 DIAGNOSIS — N52.9 ERECTILE DYSFUNCTION, UNSPECIFIED ERECTILE DYSFUNCTION TYPE: Chronic | ICD-10-CM

## 2020-12-23 DIAGNOSIS — Z86.16 HISTORY OF 2019 NOVEL CORONAVIRUS DISEASE (COVID-19): ICD-10-CM

## 2020-12-23 DIAGNOSIS — E78.2 MIXED HYPERLIPIDEMIA: Chronic | ICD-10-CM

## 2020-12-23 DIAGNOSIS — I10 ESSENTIAL HYPERTENSION: Chronic | ICD-10-CM

## 2020-12-23 DIAGNOSIS — I49.8 OTHER CARDIAC ARRHYTHMIA: ICD-10-CM

## 2020-12-23 DIAGNOSIS — N28.9 RENAL INSUFFICIENCY: ICD-10-CM

## 2020-12-23 DIAGNOSIS — E11.65 UNCONTROLLED TYPE 2 DIABETES MELLITUS WITH HYPERGLYCEMIA (HCC): Primary | ICD-10-CM

## 2020-12-23 LAB
ALBUMIN SERPL-MCNC: 4.3 G/DL (ref 3.5–5.2)
ALBUMIN/GLOB SERPL: 1.1 G/DL
ALP SERPL-CCNC: 111 U/L (ref 39–117)
ALT SERPL-CCNC: 21 U/L (ref 1–41)
AST SERPL-CCNC: 22 U/L (ref 1–40)
BASOPHILS # BLD AUTO: 0.03 10*3/MM3 (ref 0–0.2)
BASOPHILS NFR BLD AUTO: 0.5 % (ref 0–1.5)
BILIRUB SERPL-MCNC: 0.3 MG/DL (ref 0–1.2)
BUN SERPL-MCNC: 32 MG/DL (ref 6–20)
BUN/CREAT SERPL: 22.7 (ref 7–25)
CALCIUM SERPL-MCNC: 10.1 MG/DL (ref 8.6–10.5)
CHLORIDE SERPL-SCNC: 99 MMOL/L (ref 98–107)
CHOLEST SERPL-MCNC: 152 MG/DL (ref 0–200)
CO2 SERPL-SCNC: 26.6 MMOL/L (ref 22–29)
CREAT SERPL-MCNC: 1.41 MG/DL (ref 0.76–1.27)
EOSINOPHIL # BLD AUTO: 0.19 10*3/MM3 (ref 0–0.4)
EOSINOPHIL NFR BLD AUTO: 3 % (ref 0.3–6.2)
ERYTHROCYTE [DISTWIDTH] IN BLOOD BY AUTOMATED COUNT: 12.8 % (ref 12.3–15.4)
FOLATE SERPL-MCNC: 13.9 NG/ML (ref 4.78–24.2)
GLOBULIN SER CALC-MCNC: 3.8 GM/DL
GLUCOSE SERPL-MCNC: 123 MG/DL (ref 65–99)
HBA1C MFR BLD: 9.5 % (ref 4.8–5.6)
HCT VFR BLD AUTO: 47.6 % (ref 37.5–51)
HDLC SERPL-MCNC: 34 MG/DL (ref 40–60)
HGB BLD-MCNC: 16.3 G/DL (ref 13–17.7)
IMM GRANULOCYTES # BLD AUTO: 0.03 10*3/MM3 (ref 0–0.05)
IMM GRANULOCYTES NFR BLD AUTO: 0.5 % (ref 0–0.5)
LDLC SERPL CALC-MCNC: 88 MG/DL (ref 0–100)
LYMPHOCYTES # BLD AUTO: 1.62 10*3/MM3 (ref 0.7–3.1)
LYMPHOCYTES NFR BLD AUTO: 25.9 % (ref 19.6–45.3)
MCH RBC QN AUTO: 30.9 PG (ref 26.6–33)
MCHC RBC AUTO-ENTMCNC: 34.2 G/DL (ref 31.5–35.7)
MCV RBC AUTO: 90.2 FL (ref 79–97)
MICROALBUMIN UR-MCNC: 9.3 UG/ML
MONOCYTES # BLD AUTO: 0.73 10*3/MM3 (ref 0.1–0.9)
MONOCYTES NFR BLD AUTO: 11.7 % (ref 5–12)
NEUTROPHILS # BLD AUTO: 3.66 10*3/MM3 (ref 1.7–7)
NEUTROPHILS NFR BLD AUTO: 58.4 % (ref 42.7–76)
NRBC BLD AUTO-RTO: 0 /100 WBC (ref 0–0.2)
PLATELET # BLD AUTO: 231 10*3/MM3 (ref 140–450)
POTASSIUM SERPL-SCNC: 4.9 MMOL/L (ref 3.5–5.2)
PROT SERPL-MCNC: 8.1 G/DL (ref 6–8.5)
PSA SERPL-MCNC: 0.68 NG/ML (ref 0–4)
RBC # BLD AUTO: 5.28 10*6/MM3 (ref 4.14–5.8)
SODIUM SERPL-SCNC: 136 MMOL/L (ref 136–145)
TRIGL SERPL-MCNC: 172 MG/DL (ref 0–150)
TSH SERPL DL<=0.005 MIU/L-ACNC: 2.47 UIU/ML (ref 0.27–4.2)
VIT B12 SERPL-MCNC: 469 PG/ML (ref 211–946)
VLDLC SERPL CALC-MCNC: 30 MG/DL (ref 5–40)
WBC # BLD AUTO: 6.26 10*3/MM3 (ref 3.4–10.8)

## 2020-12-23 PROCEDURE — 99214 OFFICE O/P EST MOD 30 MIN: CPT | Performed by: FAMILY MEDICINE

## 2020-12-23 NOTE — PATIENT INSTRUCTIONS
To avoid further kidney function decline  A. Treat any time you think you have infection  B. Stay hydrated (dont get dehydrated); drink at least 60 oz fluid every 24 hr (1800 cc or nearly a 2L)  C. Do not allow any xrays with dye WITHOUT the doctor ordering checking your renal function  D. Do not get new medications without the doctor considering your renal condition  E. Do not use motrin/ibuprofen, alleve/naprosyn and these types of medications    ########################

## 2020-12-23 NOTE — PROGRESS NOTES
Subjective   Abdullahi Gomez is a 60 y.o. male presenting with chief complaint of:   Chief Complaint   Patient presents with   • Diabetes   • Hypertension   • Hyperlipidemia       History of Present Illness :  Alone.       Has multiple chronic problems to consider that might have a bearing on today's issues;  an interval appointment.       Chronic/acute problems reviewed today:   1. Uncontrolled type 2 diabetes mellitus with hyperglycemia (CMS/[facility]) Chronic/variable. Problem with pattern hyperglycemia without manifest by poly- dypsia, phagia, uria, or sweats, diaphoretic episodes, syncope/near.     2. Essential hypertension Chronic/stable. Stable here past/no recent home blood pressures.  No significant chest pain, SOB, LE edema, orthopnea, near syncope, dizziness/light headness.   Recent Vitals       12/11/2019 12/4/2020 12/23/2020       BP:  116/72  --  98/60     Pulse:  96  88  80     Temp:  98.5 °F (36.9 °C)  97.8 °F (36.6 °C)  97.5 °F (36.4 °C)     Weight:  88 kg (194 lb)  --  91.8 kg (202 lb 6.4 oz)     BMI (Calculated):  27.1  --  28.2            3. Mixed hyperlipidemia Chronic/stable.  Tolerated use of Rx with labs showing improved lipid values and tolerant liver labs. No muscle aches unexpected.      4. Other cardiac arrhythmia Chronic/stable.   History of benign PVCs.  Rhythm currently seems controlled.  Medications seem tolerated.  No syncope near syncope.     5. Renal insufficiency Chronic/stable.  No nephrology.  No dysuria.  Previously warned/reminded:   To avoid further kidney function decline  A. Treat any time you think you have infection  B. Stay hydrated (dont get dehydrated); drink at least 60 oz fluid every 24 hr (1800 cc or nearly a 2L)  C. Do not allow any xrays with dye WITHOUT the doctor ordering checking your renal function  D. Do not get new medications without the doctor considering your renal condition  E. Do not use motrin/ibuprofen, alleve/naprosyn and these types of medications     6.  Erectile dysfunction, unspecified erectile dysfunction type Chronic/stable:  Occ use of Rx helps achieve, maintain erections to all for adequate intercourse.  No side effects encountered.  Aware to not use nitroglycerin products 24 hr of Rx.      7. History of 2019 novel coronavirus disease (COVID-19) acute recent issues; his opinion he has resolved all symptoms benign forgetfulness memory loss cough shortness of breath and certainly no chest pain.     Has an/another acute issue today: none.    The following portions of the patient's history were reviewed and updated as appropriate: allergies, current medications, past family history, past medical history, past social history, past surgical history and problem list.      Current Outpatient Medications:   •  aspirin 81 MG EC tablet, Take 81 mg by mouth Daily., Disp: , Rfl:   •  colesevelam (WELCHOL) 625 MG tablet, TAKE 3 TABLETS BY MOUTH TWICE DAILY WITH MEALS, Disp: 540 tablet, Rfl: 1  •  glyburide (DIAbeta) 5 MG tablet, Take 2 tablets by mouth 2 (Two) Times a Day With Meals., Disp: 360 tablet, Rfl: 1  •  JARDIANCE 25 MG tablet, TAKE 1 TABLET BY MOUTH DAILY, Disp: 90 tablet, Rfl: 2  •  lisinopril (PRINIVIL,ZESTRIL) 10 MG tablet, Take 1 tablet by mouth Daily., Disp: 90 tablet, Rfl: 3  •  metFORMIN (GLUCOPHAGE) 500 MG tablet, Take 2 tablets by mouth 2 (Two) Times a Day With Meals., Disp: 360 tablet, Rfl: 3  •  TRULICITY 1.5 MG/0.5ML solution pen-injector, INJECT 0.5ML (=1.5 MG)      SUBCUTANEOUSLY (UNDER THE SKIN) INTO THE APPROPRIATE AREA ONCE WEEKLY AS DIRECTED, Disp: 6 mL, Rfl: 3  •  tadalafil (CIALIS) 20 MG tablet, Take 20 mg by mouth Daily As Needed for erectile dysfunction., Disp: , Rfl:     No problems with medications.  Refills if needed done    No Known Allergies    Review of Systems  GENERAL:  Active/slower with limits, speed, stamina for age.  Sleep is ok. No fever now/recent.  ENDO:  No syncope, near or diaphoretic sweaty spells.  BS variable;  no download  noted.  HEENT: No head injury or headache.   No vision change.  No significant hearing loss.  Ears without pain/drainage.  No sore throat.  No usual significant nasal/sinus congestion/drainage; worse couple weeks.  No epistaxis.  CHEST: No chest wall tenderness or mass. No usual significant cough, without wheeze.  No SOB; no hemoptysis.  CV: No chest pain, palpitations, ankle edema.  GI: No heartburn, dysphagia.  No abdominal pain, diarrhea, constipation.  No rectal bleeding, or melena.    :  Voids without dysuria, or incontinence to completion.   ORTHO: No painful/swollen joints but various on /off sore.  No sore neck or back.  No acute neck or back pain without recent injury.   NEURO: No dizziness, weakness of extremities.  No change occ numbness/paresthesias.   Infrequent tics of L cheek.   PSYCH: No memory loss.  Mood good; not anxious, depressed   Screening:  Mammogram: NA  Bone density: NA  Low dose CT chest: Tobacco-smoker/never: NA  GI: Colon-nl/[facility]/6.12.17/10y  Prostate: 12.23.20 AUA ok/same  12.11.19 AUA 3/1  7.13.18  Usual lab order  6m CMP, A1c  12m CBC, CMP, A1c, LIPID, TSH, PSAs, B12, folate      Lab Results:  Results for orders placed or performed in visit on 12/21/20   Comprehensive Metabolic Panel    Specimen: Blood   Result Value Ref Range    Glucose 123 (H) 65 - 99 mg/dL    BUN 32 (H) 6 - 20 mg/dL    Creatinine 1.41 (H) 0.76 - 1.27 mg/dL    eGFR Non African Am 52 (L) >60 mL/min/1.73    eGFR African Am 63 >60 mL/min/1.73    BUN/Creatinine Ratio 22.7 7.0 - 25.0    Sodium 136 136 - 145 mmol/L    Potassium 4.9 3.5 - 5.2 mmol/L    Chloride 99 98 - 107 mmol/L    Total CO2 26.6 22.0 - 29.0 mmol/L    Calcium 10.1 8.6 - 10.5 mg/dL    Total Protein 8.1 6.0 - 8.5 g/dL    Albumin 4.30 3.50 - 5.20 g/dL    Globulin 3.8 gm/dL    A/G Ratio 1.1 g/dL    Total Bilirubin 0.3 0.0 - 1.2 mg/dL    Alkaline Phosphatase 111 39 - 117 U/L    AST (SGOT) 22 1 - 40 U/L    ALT (SGPT) 21 1 - 41 U/L   Hemoglobin A1c     Specimen: Blood   Result Value Ref Range    Hemoglobin A1C 9.50 (H) 4.80 - 5.60 %   Lipid Panel    Specimen: Blood   Result Value Ref Range    Total Cholesterol 152 0 - 200 mg/dL    Triglycerides 172 (H) 0 - 150 mg/dL    HDL Cholesterol 34 (L) 40 - 60 mg/dL    VLDL Cholesterol Solo 30 5 - 40 mg/dL    LDL Chol Calc (NIH) 88 0 - 100 mg/dL   TSH    Specimen: Blood   Result Value Ref Range    TSH 2.470 0.270 - 4.200 uIU/mL   Vitamin B12    Specimen: Blood   Result Value Ref Range    Vitamin B-12 469 211 - 946 pg/mL   Folate    Specimen: Blood   Result Value Ref Range    Folate 13.90 4.78 - 24.20 ng/mL   MicroAlbumin, Urine, Random - Urine, Clean Catch    Specimen: Urine, Clean Catch   Result Value Ref Range    Microalbumin, Urine 9.3 Not Estab. ug/mL   PSA DIAGNOSTIC ONLY    Specimen: Blood   Result Value Ref Range    PSA 0.677 0.000 - 4.000 ng/mL   CBC & Differential    Specimen: Blood   Result Value Ref Range    WBC 6.26 3.40 - 10.80 10*3/mm3    RBC 5.28 4.14 - 5.80 10*6/mm3    Hemoglobin 16.3 13.0 - 17.7 g/dL    Hematocrit 47.6 37.5 - 51.0 %    MCV 90.2 79.0 - 97.0 fL    MCH 30.9 26.6 - 33.0 pg    MCHC 34.2 31.5 - 35.7 g/dL    RDW 12.8 12.3 - 15.4 %    Platelets 231 140 - 450 10*3/mm3    Neutrophil Rel % 58.4 42.7 - 76.0 %    Lymphocyte Rel % 25.9 19.6 - 45.3 %    Monocyte Rel % 11.7 5.0 - 12.0 %    Eosinophil Rel % 3.0 0.3 - 6.2 %    Basophil Rel % 0.5 0.0 - 1.5 %    Neutrophils Absolute 3.66 1.70 - 7.00 10*3/mm3    Lymphocytes Absolute 1.62 0.70 - 3.10 10*3/mm3    Monocytes Absolute 0.73 0.10 - 0.90 10*3/mm3    Eosinophils Absolute 0.19 0.00 - 0.40 10*3/mm3    Basophils Absolute 0.03 0.00 - 0.20 10*3/mm3    Immature Granulocyte Rel % 0.5 0.0 - 0.5 %    Immature Grans Absolute 0.03 0.00 - 0.05 10*3/mm3    nRBC 0.0 0.0 - 0.2 /100 WBC       A1C:  Lab Results - Last 18 Months   Lab Units 12/21/20  0704 08/14/20  0707 06/16/20  0707 12/04/19  0719 10/28/19  0813 10/14/19  0943 10/07/19  0905   HEMOGLOBIN A1C % 9.50*  "7.90* 9.50* 8.00* 7.40* 7.00* 7.10*     PSA:  Lab Results - Last 18 Months   Lab Units 12/21/20  0704 12/04/19 0719   PSA ng/mL 0.677 0.488     CBC:  Lab Results - Last 18 Months   Lab Units 12/21/20  0704 12/04/19  0719 10/28/19  0813 10/21/19  0921 10/14/19  0943 10/07/19  0905 09/30/19  0858   WBC 10*3/mm3 6.26 5.70 5.33 4.98 6.36 5.31 4.81   HEMOGLOBIN g/dL 16.3 15.2 14.1 14.3 15.1 14.5 14.0   HEMATOCRIT % 47.6 44.7 41.1 41.1 44.2 42.1 41.3   PLATELETS 10*3/mm3 231 172 155 172 199 186 204      BMP/CMP:  Lab Results - Last 18 Months   Lab Units 12/21/20  0704 06/16/20  0707 12/04/19  0719 10/28/19  0813 10/21/19  0921 10/14/19  0943 10/07/19  0905   SODIUM mmol/L 136 135* 139 135* 138 139 137   POTASSIUM mmol/L 4.9 3.9 4.4 4.7 3.9 4.9 5.2   CHLORIDE mmol/L 99 102 99 97* 99 100 99   TOTAL CO2 mmol/L 26.6 25.4 28.4  --   --   --   --    CO2 mmol/L  --   --   --  27.0 27.0 25.0 29.0   GLUCOSE mg/dL 123* 130* 78  --   --   --   --    BUN mg/dL 32* 12 20 32* 12 29* 26*   CREATININE mg/dL 1.41* 1.23 1.28* 1.01 0.90 0.99 0.91   EGFR IF NONAFRICN AM mL/min/1.73 52* 61 58* 76 87 78 86   EGFR IF AFRICN AM mL/min/1.73 63 74 70  --   --   --   --    CALCIUM mg/dL 10.1 9.0 10.0 9.1 8.4* 9.8 9.2     HEPATIC:  Lab Results - Last 18 Months   Lab Units 12/21/20  0704 06/16/20  0707 12/04/19  0719 10/28/19  0813 10/21/19  0921 10/14/19  0943 10/07/19  0905   ALT (SGPT) U/L 21 10 13 20 20 17 15   AST (SGOT) U/L 22 12 16 18 20 19 20   ALK PHOS U/L 111 85 117 100 108 125* 114     THYROID:  Lab Results - Last 18 Months   Lab Units 12/21/20  0704 12/04/19  0719   TSH uIU/mL 2.470 4.410*       Objective   BP 98/60 (BP Location: Right arm, Patient Position: Sitting, Cuff Size: Large Adult)   Pulse 80   Temp 97.5 °F (36.4 °C) (Infrared)   Resp 18   Ht 180.3 cm (71\")   Wt 91.8 kg (202 lb 6.4 oz)   SpO2 98%   BMI 28.23 kg/m²   Body mass index is 28.23 kg/m².    Recent Vitals       12/11/2019 12/4/2020 12/23/2020       BP:  116/72  " --  98/60     Pulse:  96  88  80     Temp:  98.5 °F (36.9 °C)  97.8 °F (36.6 °C)  97.5 °F (36.4 °C)     Weight:  88 kg (194 lb)  --  91.8 kg (202 lb 6.4 oz)     BMI (Calculated):  27.1  --  28.2           Physical Exam  GENERAL:  Well nourished/developed in no acute distress. Obese   SKIN: Turgor excellent, without wound, rash, lesion other than wrapped mid L foot; dressing dry.   HEENT: Normal cephalic without trauma.  Pupils equal round reactive to light. Extraocular motions full without nystagmus.   External canals nonobstructive nontender without reddness. Tymphatic membranes vale with ottoniel structures intact.   Oral cavity without growths, exudates, and moist.  Posterior pharynx without mass, obstruction, redness.  No thyromegaly, mass, tenderness, lymphadenopathy and supple.  CV: Regular rhythm.  No murmur, gallop,  edema. Posterior pulses intact.  No carotid bruits.  CHEST: No chest wall tenderness or mass.   LUNGS: Symmetric motion with clear to auscultation.   ABD: Soft, nontender without mass.    PROSTATE: No visible/palpable lesion/tenderness and anal tone intact/full.  Prostate 20 gm/smooth and nontender.  No rectal mass within reach. Stool on glove brown.   ORTHO: Symmetric extremities without swelling/point tenderness.  Full gross range of motion.  NEURO: CN 2-12 grossly intact.  Symmetric facies. 1/4 x bicep knee equal reflexes.  UE/LE   3-4/5 strength throughout.  Nonfocal use extremities. Speech clear.  Reduced light touch with monofilament, vibratory sensation with tuning fork; equal toes/distal feet.    PSYCH: Oriented x 3.  Pleasant calm, well kept.  Purposeful/directed conservation with intact short/long gross memory     Assessment/Plan     1. Uncontrolled type 2 diabetes mellitus with hyperglycemia (CMS/HCC)    2. Essential hypertension    3. Mixed hyperlipidemia    4. Other cardiac arrhythmia    5. Renal insufficiency    6. Erectile dysfunction, unspecified erectile dysfunction type    7.  History of 2019 novel coronavirus disease (COVID-19)      Discussions:   Need better A1c; increase trulicity  Start two trulicity weekly; nursing to clarify which drug store and order 3mg    Rx: reviewed/changes:  No orders of the defined types were placed in this encounter.    LAB/Testing/Referrals: reviewed/orders:   Today:   No orders of the defined types were placed in this encounter.    Chronic/recurrent labs above or change to:   same     Body mass index is 28.23 kg/m².  Patient's Body mass index is 28.23 kg/m². BMI is within normal parameters. No follow-up required..      Tobacco use reviewed:    Abdullahi Gomez  reports that he has never smoked. He has never used smokeless tobacco..    Patient Instructions   To avoid further kidney function decline  A. Treat any time you think you have infection  B. Stay hydrated (dont get dehydrated); drink at least 60 oz fluid every 24 hr (1800 cc or nearly a 2L)  C. Do not allow any xrays with dye WITHOUT the doctor ordering checking your renal function  D. Do not get new medications without the doctor considering your renal condition  E. Do not use motrin/ibuprofen, alleve/naprosyn and these types of medications    ########################          Follow up: Return for lab 3m then lab/Dr Gandhi 6m.  Future Appointments   Date Time Provider Department Center   3/23/2021  8:25 AM LAB PC [place] [facility]   6/21/2021  8:15 AM LAB PC [place] [facility]   6/23/2021  9:30 AM Davi Gandhi MD [facility]       Procedures none

## 2020-12-25 PROBLEM — Z86.16 HISTORY OF 2019 NOVEL CORONAVIRUS DISEASE (COVID-19): Status: ACTIVE | Noted: 2020-12-25

## 2020-12-28 DIAGNOSIS — E11.65 UNCONTROLLED TYPE 2 DIABETES MELLITUS WITH HYPERGLYCEMIA (HCC): Primary | ICD-10-CM

## 2020-12-28 NOTE — TELEPHONE ENCOUNTER
----- Message from Davi Gandhi MD sent at 12/25/2020  5:26 PM CST -----  Sent in trulicity 3 mg (he was told 23rd to use two 1.5 and we would move to 3 mg)  I forgot which drug store he wanted: [facility] OR [facility] OR something else

## 2021-02-04 NOTE — PROGRESS NOTES
Subjective   Abdullahi Gomez is a 58 y.o. male presenting with chief complaint of:   Chief Complaint   Patient presents with   • parathyroid Problem     discuss seeing ENT    • Diabetes   • Hypertension   • Hyperlipidemia       History of Present Illness :  Alone.   Has multiple chronic problems to consider that might have a bearing on today's issues;  an interval appointment.       Chronic/acute problems reviewed today:   1. Hypertension, unspecified type: Chronic/stable. Stable here/if home blood pressures.  No significant chest pain, SOB, LE edema, orthopnea, near syncope, dizziness/light headness.      2. Hyperlipidemia, unspecified hyperlipidemia type: Chronic/stable.  Tolerated use of statin with labs showing improved lipid values and tolerant liver labs. No muscle aches unexpected.       3. Other cardiac arrhythmia: chronic/stable history of PVC; still occ palpitation without chest pain, sob, syncope/near.    4. Uncontrolled type 2 diabetes mellitus with hyperglycemia (CMS/[facility]): chronic/not controlled increase in A1c last check. No problem/pattern hypoglycemia/hyperglycemia manifest by poly- dypsia, phagia, uria, or sweats, diaphoretic episodes, syncope/near.     5. Hypercalcemia-watching: chronic/worsening lately with neg PTH in the past.  Has been using a lot of tums; but stopped recently.    6. Sinusitis, unspecified chronicity, unspecified location: acute/above    7. Acute URI: acute/above.      Has an/another acute issue today: one week of sinus congestion, drainage, sore throat, dry cough without wheeze; no fever over one week.  Not getting better. .    The following portions of the patient's history were reviewed and updated as appropriate: allergies, current medications, past family history, past medical history, past social history, past surgical history and problem list.      Current Outpatient Medications:   •  aspirin 81 MG EC tablet, Take 81 mg by mouth Daily., Disp: , Rfl:   •  Canagliflozin  (INVOKANA) 300 MG tablet, Take 300 mg by mouth Daily., Disp: 90 tablet, Rfl: 1  •  colesevelam (WELCHOL) 625 MG tablet, Take 3 tablets by mouth 2 (Two) Times a Day With Meals., Disp: 540 tablet, Rfl: 1  •  Exenatide ER (BYDUREON) 2 MG pen-injector, Inject 2 mg under the skin Every 7 (Seven) Days., Disp: 12 each, Rfl: 3  •  glyBURIDE (DIAbeta) 5 MG tablet, Take 5 mg by mouth 2 (Two) Times a Day With Meals., Disp: , Rfl:   •  lisinopril (PRINIVIL,ZESTRIL) 10 MG tablet, TAKE 1 TABLET DAILY, Disp: 90 tablet, Rfl: 2  •  metFORMIN (GLUCOPHAGE) 500 MG tablet, TAKE 2 TABLETS 2 TIMES     DAILY WITH MEALS, Disp: 360 tablet, Rfl: 2  •  tadalafil (CIALIS) 20 MG tablet, Take 20 mg by mouth Daily As Needed for erectile dysfunction., Disp: , Rfl:     No problems with medications.  Refills if needed done    No Known Allergies    Review of Systems  GENERAL:  Active/slower with limits, speed, stamina for age. Sleep is ok. No fever now.  ENDO:  No syncope, near or diaphoretic sweaty spells.  BS Ok: no download noted.  HEENT: No head injury or headache,  No vision change, No significant hearing loss.  Ears without pain/drainage.  No sore throat.  No usual significant nasal/sinus congestion/drainage; worse couple weeks.  No epistaxis.  CHEST: No chest wall tenderness or mass. No usual significant cough, without wheeze.  No SOB; no hemoptysis.  CV: No chest pain, palpitations, ankle edema.  GI: No heartburn, dysphagia.  No abdominal pain, diarrhea, constipation.  No rectal bleeding, or melena.    :  Voids without dysuria, or incontinence to completion.   ORTHO: No painful/swollen joints but various on /off sore.  No  sore neck or back.  No acute neck or back pain without recent injury.   NEURO: No dizziness, weakness of extremities.  No change occ numbness/paresthesias.   Two weeks tics of L cheek.   PSYCH: No memory loss.  Mood good; not anxious, depressed   Screening:  Mammogram: NA  Bone density: NA  Low dose CT chest: NA  GI:  Colon-nl/[facility]/[place]/6.12.17/10y  Prostate: 7.13.18  Usual lab order  6m CMP, A1c  12m CBC, CMP, A1c, LIPID, TSH, PSAs, B12, folate    Results for orders placed or performed in visit on 10/31/18   Basic Metabolic Panel   Result Value Ref Range    Glucose 108 (H) 70 - 100 mg/dL    BUN 12 5 - 21 mg/dL    Creatinine 1.13 0.50 - 1.40 mg/dL    eGFR Non African Am 67 >60 mL/min/1.73    eGFR African Am 81 >60 mL/min/1.73    BUN/Creatinine Ratio 10.6 7.0 - 25.0    Sodium 140 135 - 145 mmol/L    Potassium 5.1 3.5 - 5.3 mmol/L    Chloride 97 (L) 98 - 110 mmol/L    Total CO2 31.0 24.0 - 31.0 mmol/L    Calcium 11.3 (H) 8.4 - 10.4 mg/dL   Hemoglobin A1c   Result Value Ref Range    Hemoglobin A1C 8.70 %       Lab Results   Component Value Date    PSA 0.518 07/11/2018    PSA 0.404 12/02/2016        Lab Results:  CBC:  Lab Results - Last 18 Months   Lab Units  07/11/18   0832   HEMOGLOBIN g/dL  16.5   HEMATOCRIT %  49.2   PLATELETS 10*3/mm3  183      BMP/CMP:  Lab Results - Last 18 Months   Lab Units  10/31/18   0712  07/11/18   0832  05/26/17   0954   SODIUM mmol/L  140  138  138   POTASSIUM mmol/L  5.1  4.4  5.0   CHLORIDE mmol/L  97*  97*  96*   TOTAL CO2, ARTERIAL mmol/L  31.0  28.0  29.0   GLUCOSE mg/dL  108*  102*  97   BUN mg/dL  12  11  17   CREATININE mg/dL  1.13  1.00  1.12   EGFR IF NONAFRICN AM mL/min/1.73  67  78  68   EGFR IF AFRICN AM mL/min/1.73  81  94  83   CALCIUM mg/dL  11.3*  10.9*  10.5*     HEPATIC:  Lab Results - Last 18 Months   Lab Units  07/11/18   0832   ALT (SGPT) U/L  37   AST (SGOT) U/L  34   ALK PHOS U/L  85     THYROID:  Lab Results - Last 18 Months   Lab Units  07/11/18   0832   TSH mIU/mL  1.680     A1C:  Lab Results - Last 18 Months   Lab Units  10/31/18   0712  07/11/18   0832  08/25/17   0739  05/26/17   0954   HEMOGLOBIN A1C %  8.70  9.70  7.60  9.10     PSA:  Lab Results - Last 18 Months   Lab Units  07/11/18   0832   PSA ng/mL  0.518       Objective   /74   Pulse 84   Temp 97.9 °F  "(36.6 °C) (Oral)   Resp 18   Ht 180.3 cm (71\")   Wt 97.1 kg (214 lb)   SpO2 99%   BMI 29.85 kg/m²   Body mass index is 29.85 kg/m².    Physical Exam  GENERAL:  Well nourished/developed in no acute distress. Obese   SKIN: Turgor excellent, without wound, rash, lesion.  HEENT: Normal cephalic without trauma.  Pupils equal round reactive to light. Extraocular motions full without nystagmus.   External canals nonobstructive nontender without reddness. Tymphatic membranes vale with ottoniel structures intact.   Oral cavity without growths, exudates, and moist.  Posterior pharynx without mass, obstruction, redness.  No thyromegaly, mass, tenderness, lymphadenopathy and supple.  CV: Regular rhythm.  No murmur, gallop,  edema. Posterior pulses intact.  No carotid bruits.  CHEST: No chest wall tenderness or mass.   LUNGS: Symmetric motion with clear to auscultation.   ABD: Soft, nontender without mass.   PROSTATE: No visible/palpable lesion/tenderness and anal tone intact/full.  Prostate 20 gm/smooth and nontender.  No rectal mass within reach. Stool on glove brown.   ORTHO: Symmetric extremities without swelling/point tenderness.  Full gross range of motion.  NEURO: CN 2-12 grossly intact.  Symmetric facies. 1/4 x bicep knee equal reflexes.  UE/LE   3-4/5 strength throughout.  Nonfocal use extremities. Speech clear.  Reduced light touch with monofilament, vibratory sensation with tuning fork; equal toes/distal feet.    PSYCH: Oriented x 3.  Pleasant calm, well kept.  Purposeful/directed conservation with intact short/long gross memory.     Assessment/Plan     1. Hypertension, unspecified type    2. Hyperlipidemia, unspecified hyperlipidemia type    3. Other cardiac arrhythmia    4. Uncontrolled type 2 diabetes mellitus with hyperglycemia (CMS/HCC)    5. Hypercalcemia-watching    6. Sinusitis, unspecified chronicity, unspecified location    7. Acute URI      BS needs to be better: ? Next Rx  Calcium needs to be " understood  Controlled lipid, bp, PVCs  URI not improving    Rx: reviewed/changes:  Z pack    LAB/Testing/Referrals: reviewed/orders:   Today:   Orders Placed This Encounter   Procedures   • Calcium, Ionized   • PTH, Intact   • Vitamin D 25 Hydroxy   • Calcium       Discussions:   Maybe referral to ent needed  Body mass index is 29.85 kg/m².   Patient's Body mass index is 29.85 kg/m². BMI is above normal parameters. Recommendations include: exercise counseling, nutrition counseling and as before.  Non-smoker      There are no Patient Instructions on file for this visit.    Follow up: Return for lab today; Dr Gandhi/lab 6m.  Future Appointments   Date Time Provider Department Center   5/15/2019  8:40 AM LAB PC DO [facility] None   5/22/2019  9:00 AM Davi Gandhi MD [facility] None        Otezla Counseling: The side effects of Otezla were discussed with the patient, including but not limited to worsening or new depression, weight loss, diarrhea, nausea, upper respiratory tract infection, and headache. Patient instructed to call the office should any adverse effect occur.  The patient verbalized understanding of the proper use and possible adverse effects of Otezla.  All the patient's questions and concerns were addressed.

## 2021-02-09 DIAGNOSIS — E11.65 UNCONTROLLED TYPE 2 DIABETES MELLITUS WITH HYPERGLYCEMIA (HCC): ICD-10-CM

## 2021-02-09 RX ORDER — EMPAGLIFLOZIN 25 MG/1
1 TABLET, FILM COATED ORAL DAILY
Qty: 90 TABLET | Refills: 2 | Status: SHIPPED | OUTPATIENT
Start: 2021-02-09 | End: 2021-12-14

## 2021-02-09 NOTE — TELEPHONE ENCOUNTER
Caller: Abdullahi Gomez    Relationship: Self    Best call back number: [phone]    Medication needed:   Requested Prescriptions     Pending Prescriptions Disp Refills   • Empagliflozin (Jardiance) 25 MG tablet 90 tablet 2     Sig: Take 25 mg by mouth Daily.       When do you need the refill by: 2/9/21    What details did the patient provide when requesting the medication:     Does the patient have less than a 3 day supply:  [x] Yes  [] No    What is the patient's [facility] [place], [place] - [address] AT PORTAL TO [email or web address] - [phone]  - [phone] FX

## 2021-02-09 NOTE — TELEPHONE ENCOUNTER
Requested Prescriptions     Pending Prescriptions Disp Refills   • Empagliflozin (Jardiance) 25 MG tablet 90 tablet 2     Sig: Take 25 mg by mouth Daily.

## 2021-03-29 ENCOUNTER — LAB (OUTPATIENT)
Dept: FAMILY MEDICINE CLINIC | Facility: CLINIC | Age: 61
End: 2021-03-29

## 2021-03-29 DIAGNOSIS — E66.9 CLASS 1 OBESITY WITH BODY MASS INDEX (BMI) OF 30.0 TO 30.9 IN ADULT, UNSPECIFIED OBESITY TYPE, UNSPECIFIED WHETHER SERIOUS COMORBIDITY PRESENT: ICD-10-CM

## 2021-03-29 DIAGNOSIS — E78.2 MIXED HYPERLIPIDEMIA: ICD-10-CM

## 2021-03-29 DIAGNOSIS — E11.9 CONTROLLED TYPE 2 DIABETES MELLITUS WITHOUT COMPLICATION, WITHOUT LONG-TERM CURRENT USE OF INSULIN (HCC): ICD-10-CM

## 2021-03-29 DIAGNOSIS — I10 ESSENTIAL HYPERTENSION: Primary | ICD-10-CM

## 2021-04-01 DIAGNOSIS — I10 ESSENTIAL HYPERTENSION: Chronic | ICD-10-CM

## 2021-04-01 RX ORDER — LISINOPRIL 10 MG/1
10 TABLET ORAL DAILY
Qty: 90 TABLET | Refills: 3 | Status: SHIPPED | OUTPATIENT
Start: 2021-04-01 | End: 2022-08-02

## 2021-04-01 NOTE — TELEPHONE ENCOUNTER
Requested Prescriptions     Pending Prescriptions Disp Refills   • lisinopril (PRINIVIL,ZESTRIL) 10 MG tablet 90 tablet 3     Sig: Take 1 tablet by mouth Daily.

## 2021-04-15 ENCOUNTER — TELEPHONE (OUTPATIENT)
Dept: FAMILY MEDICINE CLINIC | Facility: CLINIC | Age: 61
End: 2021-04-15

## 2021-04-15 NOTE — TELEPHONE ENCOUNTER
Ask him for insight on   4.6.21; what did he eat supper/beyond leading to 7 pm 282  4.7.21; what did he eat breakfast/AM leading to 245 at 12:45    Ie: does his diet change a lot from day/day?

## 2021-04-15 NOTE — TELEPHONE ENCOUNTER
"Spoke to patient \"tell you what , I dont think I can remember that. But some days my diet is different, I have been trying to avoid some foods too\"    Advised pt to try to keep a food/dietary log and will check with md  "

## 2021-04-19 ENCOUNTER — TELEPHONE (OUTPATIENT)
Dept: FAMILY MEDICINE CLINIC | Facility: CLINIC | Age: 61
End: 2021-04-19

## 2021-06-17 RX ORDER — COLESEVELAM 180 1/1
TABLET ORAL
Qty: 540 TABLET | Refills: 1 | Status: SHIPPED | OUTPATIENT
Start: 2021-06-17 | End: 2022-01-12

## 2021-06-17 NOTE — TELEPHONE ENCOUNTER
Requested Prescriptions     Pending Prescriptions Disp Refills   • colesevelam (WELCHOL) 625 MG tablet [Pharmacy Med Name: COLESEVELAM 625MG TABLETS] 540 tablet 1     Sig: TAKE 3 TABLETS BY MOUTH TWICE DAILY WITH MEALS

## 2021-06-21 ENCOUNTER — LAB (OUTPATIENT)
Dept: FAMILY MEDICINE CLINIC | Facility: CLINIC | Age: 61
End: 2021-06-21

## 2021-06-21 DIAGNOSIS — I10 ESSENTIAL HYPERTENSION: Primary | ICD-10-CM

## 2021-06-21 DIAGNOSIS — E11.65 UNCONTROLLED TYPE 2 DIABETES MELLITUS WITH HYPERGLYCEMIA (HCC): ICD-10-CM

## 2021-06-23 ENCOUNTER — OFFICE VISIT (OUTPATIENT)
Dept: FAMILY MEDICINE CLINIC | Facility: CLINIC | Age: 61
End: 2021-06-23

## 2021-06-23 VITALS
BODY MASS INDEX: 28 KG/M2 | DIASTOLIC BLOOD PRESSURE: 78 MMHG | HEIGHT: 71 IN | HEART RATE: 105 BPM | SYSTOLIC BLOOD PRESSURE: 116 MMHG | OXYGEN SATURATION: 98 % | TEMPERATURE: 97.1 F | WEIGHT: 200 LBS | RESPIRATION RATE: 18 BRPM

## 2021-06-23 DIAGNOSIS — E11.65 UNCONTROLLED TYPE 2 DIABETES MELLITUS WITH HYPERGLYCEMIA (HCC): ICD-10-CM

## 2021-06-23 DIAGNOSIS — E83.52 HYPERCALCEMIA: ICD-10-CM

## 2021-06-23 DIAGNOSIS — I49.8 OTHER CARDIAC ARRHYTHMIA: ICD-10-CM

## 2021-06-23 DIAGNOSIS — G62.9 NEUROPATHY: Chronic | ICD-10-CM

## 2021-06-23 DIAGNOSIS — E78.2 MIXED HYPERLIPIDEMIA: Chronic | ICD-10-CM

## 2021-06-23 DIAGNOSIS — I10 ESSENTIAL HYPERTENSION: Chronic | ICD-10-CM

## 2021-06-23 PROCEDURE — 99214 OFFICE O/P EST MOD 30 MIN: CPT | Performed by: FAMILY MEDICINE

## 2021-06-23 RX ORDER — INSULIN GLARGINE 100 [IU]/ML
50 INJECTION, SOLUTION SUBCUTANEOUS DAILY
Qty: 1 PEN | Refills: 5 | Status: SHIPPED | OUTPATIENT
Start: 2021-06-23 | End: 2021-10-20 | Stop reason: SDUPTHER

## 2021-06-23 NOTE — PATIENT INSTRUCTIONS
Start 15 units; increase every 2-3 days by 5 units until/as approaching fasting .     At that point; call us results.     ########################

## 2021-06-23 NOTE — PROGRESS NOTES
Subjective   Abdullahi Gomez is a 60 y.o. male presenting with chief complaint of:   Chief Complaint   Patient presents with   • Diabetes     6mo follow up        History of Present Illness :  Alone.       Has multiple chronic problems to consider that might have a bearing on today's issues;  an interval appointment.       Chronic/acute problems reviewed today:   1. Uncontrolled type 2 diabetes mellitus with hyperglycemia (CMS/HCC): Chronic/stable but elevated.   No problem/pattern hypoglycemia/hyperglycemia manifest by poly- dypsia, phagia, uria, or sweats, diaphoretic episodes, syncope/near.     2. Essential hypertension: Chronic/stable. Stable here past/no recent home blood pressures.  No significant chest pain, SOB, LE edema, orthopnea, near syncope, dizziness/light headness.   Recent Vitals       12/4/2020 12/23/2020 6/23/2021       BP:  --  98/60  116/78     Pulse:  88  80  105     Temp:  97.8 °F (36.6 °C)  97.5 °F (36.4 °C)  97.1 °F (36.2 °C)     Weight:  --  91.8 kg (202 lb 6.4 oz)  90.7 kg (200 lb)     BMI (Calculated):  --  28.2  27.9            3. Mixed hyperlipidemia: Chronic/stable.  Tolerated use of Rx with labs showing improved lipid values and tolerant liver labs. No muscle aches unexpected.      4. Other cardiac arrhythmia: Chronic/stable.   History of benign PVC.  Rhythm currently seems controlled.  Medications seem tolerated.  No syncope near syncope.     5. Hypercalcemia-watching: chronic/stable without symptomatic elevations   6. Neuropathy-offered Rx: chronic/variable LE numbness/stinging.  Declines desire for further Rx.  No current sores on feet.      Has an/another acute issue today: none.    The following portions of the patient's history were reviewed and updated as appropriate: allergies, current medications, past family history, past medical history, past social history, past surgical history and problem list.      Current Outpatient Medications:   •  aspirin 81 MG EC tablet, Take 81 mg  by mouth Daily., Disp: , Rfl:   •  colesevelam (WELCHOL) 625 MG tablet, TAKE 3 TABLETS BY MOUTH TWICE DAILY WITH MEALS, Disp: 540 tablet, Rfl: 1  •  Dulaglutide 3 MG/0.5ML solution pen-injector, Inject 3 mg under the skin into the appropriate area as directed 1 (One) Time Per Week., Disp: 12 pen, Rfl: 3  •  Empagliflozin (Jardiance) 25 MG tablet, Take 25 mg by mouth Daily., Disp: 90 tablet, Rfl: 2  •  glyburide (DIAbeta) 5 MG tablet, Take 2 tablets by mouth 2 (Two) Times a Day With Meals., Disp: 360 tablet, Rfl: 1  •  lisinopril (PRINIVIL,ZESTRIL) 10 MG tablet, Take 1 tablet by mouth Daily., Disp: 90 tablet, Rfl: 3  •  metFORMIN (GLUCOPHAGE) 500 MG tablet, Take 2 tablets by mouth 2 (Two) Times a Day With Meals., Disp: 360 tablet, Rfl: 3  •  tadalafil (CIALIS) 20 MG tablet, Take 20 mg by mouth Daily As Needed for erectile dysfunction., Disp: , Rfl:     No problems with medications.    No Known Allergies    Review of Systems  GENERAL:  Active/slower with limits, speed, stamina for age.  Sleep is ok. No fever now.  ENDO:  No syncope, near or diaphoretic sweaty spells.  BS variable;  no download noted.  HEENT: No head injury or headache.   No vision change.  No significant hearing loss.  Ears without pain/drainage.  No sore throat.  No usual significant nasal/sinus congestion/drainage; worse couple weeks.  No epistaxis.  CHEST: No chest wall tenderness or mass. No usual significant cough, without wheeze.  No SOB; no hemoptysis.  CV: No chest pain, palpitations, ankle edema.  GI: No heartburn, dysphagia.  No abdominal pain, diarrhea, constipation.  No rectal bleeding, or melena.    :  Voids without dysuria, or incontinence to completion.   ORTHO: No painful/swollen joints but various on /off sore.  No sore neck or back.  No acute neck or back pain without recent injury.   NEURO: No dizziness, weakness of extremities.  No change occ numbness/paresthesias.   Two weeks tics of L cheek.   PSYCH: No memory loss.  Mood good;  not anxious, depressed   Screening:  Mammogram: NA  Bone density: NA  Low dose CT chest: Tobacco-smoker/never: NA  GI: Colon-nl/[facility]/6.12.17/10y  Prostate: 12.23.20  12.11.19 AUA 3/1  7.13.18  Usual lab order  6m CMP, A1c  12m CBC, CMP, A1c, LIPID, TSH, PSAs, B12, folate    Data reviewed:     Lab Results:  Results for orders placed or performed in visit on 06/21/21   Comprehensive Metabolic Panel    Specimen: Blood   Result Value Ref Range    Glucose 118 (H) 65 - 99 mg/dL    BUN 22 (H) 6 - 20 mg/dL    Creatinine 1.36 (H) 0.76 - 1.27 mg/dL    eGFR Non African Am 54 (L) >60 mL/min/1.73    eGFR African Am 65 >60 mL/min/1.73    BUN/Creatinine Ratio 16.2 7.0 - 25.0    Sodium 140 136 - 145 mmol/L    Potassium 4.6 3.5 - 5.2 mmol/L    Chloride 103 98 - 107 mmol/L    Total CO2 25.5 22.0 - 29.0 mmol/L    Calcium 9.5 8.6 - 10.5 mg/dL    Total Protein 6.9 6.0 - 8.5 g/dL    Albumin 4.40 3.50 - 5.20 g/dL    Globulin 2.5 gm/dL    A/G Ratio 1.8 g/dL    Total Bilirubin 0.4 0.0 - 1.2 mg/dL    Alkaline Phosphatase 107 39 - 117 U/L    AST (SGOT) 17 1 - 40 U/L    ALT (SGPT) 19 1 - 41 U/L   Hemoglobin A1c    Specimen: Blood   Result Value Ref Range    Hemoglobin A1C 10.10 (H) 4.80 - 5.60 %       A1C:  Lab Results - Last 18 Months   Lab Units 06/21/21  0710 03/29/21  0742 12/21/20  0704 08/14/20  0707 06/16/20  0707   HEMOGLOBIN A1C % 10.10* 9.40* 9.50* 7.90* 9.50*     LIPID:  Lab Results - Last 18 Months   Lab Units 12/21/20  0704   CHOLESTEROL mg/dL 152   LDL CHOL mg/dL 88   HDL CHOL mg/dL 34*   TRIGLYCERIDES mg/dL 172*     PSA:  Lab Results - Last 18 Months   Lab Units 12/21/20  0704   PSA ng/mL 0.677     CBC:  Lab Results - Last 18 Months   Lab Units 03/29/21  0742 12/21/20  0704   WBC 10*3/mm3 5.45 6.26   HEMOGLOBIN g/dL 16.4 16.3   HEMATOCRIT % 46.5 47.6   PLATELETS 10*3/mm3 188 231      BMP/CMP:  Lab Results - Last 18 Months   Lab Units 06/21/21  0710 12/21/20  0704 06/16/20  0707   SODIUM mmol/L 140 136 135*   POTASSIUM  "mmol/L 4.6 4.9 3.9   CHLORIDE mmol/L 103 99 102   TOTAL CO2 mmol/L 25.5 26.6 25.4   GLUCOSE mg/dL 118* 123* 130*   BUN mg/dL 22* 32* 12   CREATININE mg/dL 1.36* 1.41* 1.23   EGFR IF NONAFRICN AM mL/min/1.73 54* 52* 61   EGFR IF AFRICN AM mL/min/1.73 65 63 74   CALCIUM mg/dL 9.5 10.1 9.0     HEPATIC:  Lab Results - Last 18 Months   Lab Units 06/21/21  0710 12/21/20  0704 06/16/20  0707   ALT (SGPT) U/L 19 21 10   AST (SGOT) U/L 17 22 12   ALK PHOS U/L 107 111 85     THYROID:  Lab Results - Last 18 Months   Lab Units 12/21/20  0704   TSH uIU/mL 2.470       Objective   /78   Pulse 105   Temp 97.1 °F (36.2 °C) (Infrared)   Resp 18   Ht 180.3 cm (71\")   Wt 90.7 kg (200 lb)   SpO2 98%   BMI 27.89 kg/m²   Body mass index is 27.89 kg/m².    Recent Vitals       12/4/2020 12/23/2020 6/23/2021       BP:  --  98/60  116/78     Pulse:  88  80  105     Temp:  97.8 °F (36.6 °C)  97.5 °F (36.4 °C)  97.1 °F (36.2 °C)     Weight:  --  91.8 kg (202 lb 6.4 oz)  90.7 kg (200 lb)     BMI (Calculated):  --  28.2  27.9           Physical Exam  GENERAL:  Well nourished/developed in no acute distress. Obese   SKIN: Turgor excellent, without wound, rash, lesion    HEENT: Normal cephalic without trauma.  Pupils equal round reactive to light. Extraocular motions full without nystagmus.   External canals nonobstructive nontender without reddness. Tymphatic membranes vale with ottoniel structures intact.   Oral cavity without growths, exudates, and moist.  Posterior pharynx without mass, obstruction, redness.  No thyromegaly, mass, tenderness, lymphadenopathy and supple.  CV: Regular rhythm.  No murmur, gallop,  edema. Posterior pulses intact/weakly.  No carotid bruits.  CHEST: No chest wall tenderness or mass.   LUNGS: Symmetric motion with clear to auscultation.   ABD: Soft, nontender without mass.    ORTHO: Symmetric extremities without swelling/point tenderness.  Full gross range of motion.  NEURO: CN 2-12 grossly intact.  " Symmetric facies. 1/4 x bicep knee equal reflexes.  UE/LE   3-4/5 strength throughout.  Nonfocal use extremities. Speech clear.  Reduced light touch with monofilament, vibratory sensation with tuning fork; equal toes/distal feet.    PSYCH: Oriented x 3.  Pleasant calm, well kept.  Purposeful/directed conservation with intact short/long gross memory     Assessment/Plan     1. Uncontrolled type 2 diabetes mellitus with hyperglycemia (CMS/[facility])    2. Essential hypertension    3. Mixed hyperlipidemia    4. Other cardiac arrhythmia    5. Hypercalcemia-watching    6. Neuropathy-offered Rx      Discussion:  Time for insulin  Start long acting; discussed how works, goals for  range then call  Start 15; increase 5 every 2-3 days  Stop glybride; keep others  May/likely will need short acting insulin    Medical decision issues:   Data review above:   Rx: reviewed and decisions:     Visit today involved significant medical illness/diagnosis and intensive drug monitoring: ie potential to cause serious morbidity or death:   New Medications Ordered This Visit   Medications   • Insulin Glargine (BASAGLAR KWIKPEN) 100 UNIT/ML injection pen     Sig: Inject 50 Units under the skin into the appropriate area as directed Daily.     Dispense:  1 pen     Refill:  5     Orders placed:   LAB/Testing/Referrals: reviewed/orders:   Today:   No orders of the defined types were placed in this encounter.    Chronic/recurrent labs above or change to:   Same  (will add 3m A1c next visit)     Health maintenance:   Body mass index is 27.89 kg/m².  Patient's Body mass index is 27.89 kg/m². indicating that he is overweight (BMI 25-29.9). Obesity-related health conditions include the following: diabetes mellitus. Obesity is unchanged. BMI is is above average; BMI management plan is completed. We discussed portion control and increasing exercise..    Tobacco use reviewed:    Abdullahi RODRIGUEZ Jason  reports that he has never smoked. He has never used  smokeless tobacco..      Patient Instructions   Start 15 units; increase every 2-3 days by 5 units until/as approaching fasting .     At that point; call us results.     ########################        Follow up: Return for lab;, Dr Gandhi-.  Future Appointments   Date Time Provider Department Center   9/16/2021  8:35 AM LAB PC DO [facility]   9/20/2021 10:30 AM Davi Gandhi MD [facility]

## 2021-08-05 ENCOUNTER — TELEPHONE (OUTPATIENT)
Dept: FAMILY MEDICINE CLINIC | Facility: CLINIC | Age: 61
End: 2021-08-05

## 2021-08-05 DIAGNOSIS — E11.9 CONTROLLED TYPE 2 DIABETES MELLITUS WITHOUT COMPLICATION, WITHOUT LONG-TERM CURRENT USE OF INSULIN (HCC): Chronic | ICD-10-CM

## 2021-08-05 NOTE — TELEPHONE ENCOUNTER
Requested Prescriptions   Pending Prescriptions Disp Refills   • metFORMIN (GLUCOPHAGE) 500 MG tablet 360 tablet 3     Sig: Take 2 tablets by mouth 2 (Two) Times a Day With Meals.       There is no refill protocol information for this order      last ov 6-23-21, next ov 9-20-21

## 2021-08-05 NOTE — TELEPHONE ENCOUNTER
Caller: Abdullahi Gomez    Relationship: Self    Best call back number: [phone] (H)    Medication needed:    metFORMIN (GLUCOPHAGE) 500 MG tablet  1,000 mg, 2 Times Daily With Meals         When do you need the refill by: ASAP    What additional details did the patient provide when requesting the medication: NONE   Does the patient have less than a 3 day supply:  [x] Yes  [] No    What is the patient's preferred pharmacy:    [facility] #[id number] [address] [facility] [address]  [phone] [facility] [phone]   [phone]

## 2021-09-16 ENCOUNTER — LAB (OUTPATIENT)
Dept: FAMILY MEDICINE CLINIC | Facility: CLINIC | Age: 61
End: 2021-09-16

## 2021-09-16 DIAGNOSIS — I10 ESSENTIAL HYPERTENSION: Primary | ICD-10-CM

## 2021-09-16 DIAGNOSIS — E11.65 UNCONTROLLED TYPE 2 DIABETES MELLITUS WITH HYPERGLYCEMIA (HCC): ICD-10-CM

## 2021-09-20 ENCOUNTER — OFFICE VISIT (OUTPATIENT)
Dept: FAMILY MEDICINE CLINIC | Facility: CLINIC | Age: 61
End: 2021-09-20

## 2021-09-20 VITALS
HEIGHT: 71 IN | RESPIRATION RATE: 18 BRPM | HEART RATE: 94 BPM | OXYGEN SATURATION: 99 % | DIASTOLIC BLOOD PRESSURE: 66 MMHG | SYSTOLIC BLOOD PRESSURE: 100 MMHG | BODY MASS INDEX: 28.28 KG/M2 | WEIGHT: 202 LBS | TEMPERATURE: 97.8 F

## 2021-09-20 DIAGNOSIS — E11.65 UNCONTROLLED TYPE 2 DIABETES MELLITUS WITH HYPERGLYCEMIA (HCC): ICD-10-CM

## 2021-09-20 DIAGNOSIS — E78.2 MIXED HYPERLIPIDEMIA: Chronic | ICD-10-CM

## 2021-09-20 DIAGNOSIS — IMO0002 UNCONTROLLED TYPE 2 DIABETES MELLITUS WITH DIABETIC NEUROPATHY, WITH LONG-TERM CURRENT USE OF INSULIN: Primary | ICD-10-CM

## 2021-09-20 DIAGNOSIS — G62.9 NEUROPATHY: Chronic | ICD-10-CM

## 2021-09-20 DIAGNOSIS — I10 ESSENTIAL HYPERTENSION: Chronic | ICD-10-CM

## 2021-09-20 DIAGNOSIS — N28.9 RENAL INSUFFICIENCY: ICD-10-CM

## 2021-09-20 DIAGNOSIS — I49.8 OTHER CARDIAC ARRHYTHMIA: ICD-10-CM

## 2021-09-20 PROCEDURE — 99214 OFFICE O/P EST MOD 30 MIN: CPT | Performed by: FAMILY MEDICINE

## 2021-09-20 RX ORDER — PEN NEEDLE, DIABETIC, SAFETY 30 GX3/16"
1 NEEDLE, DISPOSABLE MISCELLANEOUS DAILY
Qty: 100 EACH | Refills: 5 | Status: SHIPPED | OUTPATIENT
Start: 2021-09-20 | End: 2021-09-21 | Stop reason: CLARIF

## 2021-09-20 RX ORDER — INSULIN LISPRO 100 [IU]/ML
7 INJECTION, SOLUTION INTRAVENOUS; SUBCUTANEOUS
Qty: 18.9 ML | Refills: 1 | Status: SHIPPED | OUTPATIENT
Start: 2021-09-20 | End: 2021-09-22 | Stop reason: CLARIF

## 2021-09-20 NOTE — PROGRESS NOTES
Subjective   Abdullahi Gomez is a 61 y.o. male presenting with chief complaint of:   Chief Complaint   Patient presents with   • Diabetes     3mo follow up       History of Present Illness :  Alone.       Has multiple chronic problems to consider that might have a bearing on today's issues;  an interval appointment.       Chronic/acute problems reviewed today:   1. Uncontrolled type 2 diabetes mellitus with hyperglycemia (CMS/HCC)  Chronic/variable. No problem/pattern hypoglycemia/hyperglycemia manifest by poly- dypsia, phagia, uria, or sweats, diaphoretic episodes, syncope/near.     2. Essential hypertension Chronic/stable. Stable here past/no recent home blood pressures.  No significant chest pain, SOB, LE edema, orthopnea, near syncope, dizziness/light headness.   Recent Vitals       12/23/2020 6/23/2021 9/20/2021       BP:  98/60  116/78  100/66     Pulse:  80  105  94     Temp:  97.5 °F (36.4 °C)  97.1 °F (36.2 °C)  97.8 °F (36.6 °C)     Weight:  91.8 kg (202 lb 6.4 oz)  90.7 kg (200 lb)  91.6 kg (202 lb)     BMI (Calculated):  28.2  27.9  28.2            3. Renal insufficiency Chronic/stable.  No nephrology.  No dysuria.  Previously warned/reminded:   To avoid further kidney function decline  A. Treat any time you think you have infection  B. Stay hydrated (dont get dehydrated); drink at least 60 oz fluid every 24 hr (1800 cc or nearly a 2L)  C. Do not allow any xrays with dye WITHOUT the doctor ordering checking your renal function  D. Do not get new medications without the doctor considering your renal condition  E. Do not use motrin/ibuprofen, alleve/naprosyn and these types of medications     4. Other cardiac arrhythmia Chronic/stable.   History of PVCs.  Rhythm currently seems controlled.  Medications seem tolerated.  No syncope near syncope.     5. Mixed hyperlipidemia Chronic/stable.  Tolerated use of Rx with labs showing improved lipid values and tolerant liver labs. No muscle aches unexpected.      6.  Neuropathy-offered Rx chronic stable lower extremity stinging numbness particularly of the right foot with no active source.  Offered medication and declines for symptomatology     Has an/another acute issue today: none.    The following portions of the patient's history were reviewed and updated as appropriate: allergies, current medications, past family history, past medical history, past social history, past surgical history and problem list.      Current Outpatient Medications:   •  aspirin 81 MG EC tablet, Take 81 mg by mouth Daily., Disp: , Rfl:   •  colesevelam (WELCHOL) 625 MG tablet, TAKE 3 TABLETS BY MOUTH TWICE DAILY WITH MEALS, Disp: 540 tablet, Rfl: 1  •  Dulaglutide 3 MG/0.5ML solution pen-injector, Inject 3 mg under the skin into the appropriate area as directed 1 (One) Time Per Week., Disp: 12 pen, Rfl: 3  •  Empagliflozin (Jardiance) 25 MG tablet, Take 25 mg by mouth Daily., Disp: 90 tablet, Rfl: 2  •  Insulin Glargine (BASAGLAR KWIKPEN) 100 UNIT/ML injection pen, Inject 50 Units under the skin into the appropriate area as directed Daily., Disp: 1 pen, Rfl: 5  •  lisinopril (PRINIVIL,ZESTRIL) 10 MG tablet, Take 1 tablet by mouth Daily., Disp: 90 tablet, Rfl: 3  •  metFORMIN (GLUCOPHAGE) 500 MG tablet, Take 2 tablets by mouth 2 (Two) Times a Day With Meals., Disp: 360 tablet, Rfl: 3  •  tadalafil (CIALIS) 20 MG tablet, Take 20 mg by mouth Daily As Needed for erectile dysfunction., Disp: , Rfl:     DM Rx  •  Dulaglutide/Trulicity 3 MG/0.5ML solution pen-injector, Inject 3 mg under the skin into the appropriate area as directed 1 (One) Time Per Week., Disp: 12 pen, Rfl: 3  •  Empagliflozin (Jardiance) 25 MG tablet, Take 25 mg by mouth Daily., Disp: 90 tablet, Rfl: 2  •  Insulin Glargine (BASAGLAR KWIKPEN) 100 UNIT/ML injection pen, Inject 50 Units under the skin into the appropriate area as directed Daily., Disp: 1 pen, Rfl: 5 (using 30)  •  metFORMIN (GLUCOPHAGE) 500 MG tablet, Take 2 tablets by mouth  2 (Two) Times a Day With Meals., Disp: 360 tablet, Rfl: 3    No problems with medications.    No Known Allergies    Review of Systems  GENERAL:  Active/slower with limits, speed, stamina for age.  Sleep is ok. No fever now.  ENDO:  No syncope, near or diaphoretic sweaty spells.  BS variable;  no download noted.  HEENT: No head injury or headache.   No vision change.  No significant hearing loss.  Ears without pain/drainage.  No sore throat.  No usual significant nasal/sinus congestion/drainage; worse couple weeks.  No epistaxis.  CHEST: No chest wall tenderness or mass. No usual significant cough, without wheeze.  No SOB; no hemoptysis.  CV: No chest pain, palpitations, ankle edema.  GI: No heartburn, dysphagia.  No abdominal pain, diarrhea, constipation.  No rectal bleeding, or melena.    :  Voids without dysuria, or incontinence to completion.   ORTHO: No painful/swollen joints but various on /off sore.  No sore neck or back.  No acute neck or back pain without recent injury.   NEURO: No dizziness, weakness of extremities.  No change occ numbness/paresthesias.     PSYCH: No memory loss.  Mood good; not anxious, depressed   Screening:  Mammogram: NA  Bone density: NA  Low dose CT chest: Tobacco-smoker/never: NA  GI: Colon-nl/[facility]/6.12.17/10y  Prostate: 12.23.20  12.11.19 AUA 3/1  7.13.18  Usual lab order  6m CMP, A1c  12m CBC, CMP, A1c, LIPID, TSH, PSAs, B12, folate    Data reviewed:    Lab Results:  Results for orders placed or performed in visit on 09/16/21   Comprehensive Metabolic Panel    Specimen: Blood   Result Value Ref Range    Glucose 213 (H) 65 - 99 mg/dL    BUN 19 6 - 20 mg/dL    Creatinine 1.33 (H) 0.76 - 1.27 mg/dL    eGFR Non African Am 55 (L) >60 mL/min/1.73    eGFR African Am 67 >60 mL/min/1.73    BUN/Creatinine Ratio 14.3 7.0 - 25.0    Sodium 135 (L) 136 - 145 mmol/L    Potassium 4.6 3.5 - 5.2 mmol/L    Chloride 100 98 - 107 mmol/L    Total CO2 25.8 22.0 - 29.0 mmol/L    Calcium 10.0 8.6  "- 10.5 mg/dL    Total Protein 7.1 6.0 - 8.5 g/dL    Albumin 4.60 3.50 - 5.20 g/dL    Globulin 2.5 gm/dL    A/G Ratio 1.8 g/dL    Total Bilirubin 0.3 0.0 - 1.2 mg/dL    Alkaline Phosphatase 114 39 - 117 U/L    AST (SGOT) 17 1 - 40 U/L    ALT (SGPT) 14 1 - 41 U/L   Hemoglobin A1c    Specimen: Blood   Result Value Ref Range    Hemoglobin A1C 10.40 (H) 4.80 - 5.60 %       A1C:  Lab Results - Last 18 Months   Lab Units 09/16/21  0742 06/21/21  0710 03/29/21  0742 12/21/20  0704 08/14/20  0707 06/16/20  0707   HEMOGLOBIN A1C % 10.40* 10.10* 9.40* 9.50* 7.90* 9.50*     LIPID:  Lab Results - Last 18 Months   Lab Units 12/21/20  0704   CHOLESTEROL mg/dL 152   LDL CHOL mg/dL 88   HDL CHOL mg/dL 34*   TRIGLYCERIDES mg/dL 172*     PSA:  Lab Results - Last 18 Months   Lab Units 12/21/20  0704   PSA ng/mL 0.677     CBC:  Lab Results - Last 18 Months   Lab Units 03/29/21  0742 12/21/20  0704   WBC 10*3/mm3 5.45 6.26   HEMOGLOBIN g/dL 16.4 16.3   HEMATOCRIT % 46.5 47.6   PLATELETS 10*3/mm3 188 231      BMP/CMP:  Lab Results - Last 18 Months   Lab Units 09/16/21  0742 06/21/21  0710 12/21/20  0704 06/16/20  0707   SODIUM mmol/L 135* 140 136 135*   POTASSIUM mmol/L 4.6 4.6 4.9 3.9   CHLORIDE mmol/L 100 103 99 102   TOTAL CO2 mmol/L 25.8 25.5 26.6 25.4   GLUCOSE mg/dL 213* 118* 123* 130*   BUN mg/dL 19 22* 32* 12   CREATININE mg/dL 1.33* 1.36* 1.41* 1.23   EGFR IF NONAFRICN AM mL/min/1.73 55* 54* 52* 61   EGFR IF AFRICN AM mL/min/1.73 67 65 63 74   CALCIUM mg/dL 10.0 9.5 10.1 9.0     HEPATIC:  Lab Results - Last 18 Months   Lab Units 09/16/21  0742 06/21/21  0710 12/21/20  0704 06/16/20  0707   ALT (SGPT) U/L 14 19 21 10   AST (SGOT) U/L 17 17 22 12   ALK PHOS U/L 114 107 111 85     THYROID:  Lab Results - Last 18 Months   Lab Units 12/21/20  0704   TSH uIU/mL 2.470       Objective   /66   Pulse 94   Temp 97.8 °F (36.6 °C) (Infrared)   Resp 18   Ht 180.3 cm (71\")   Wt 91.6 kg (202 lb)   SpO2 99%   BMI 28.17 kg/m²   Body " mass index is 28.17 kg/m².    Recent Vitals       12/23/2020 6/23/2021 9/20/2021       BP:  98/60  116/78  100/66     Pulse:  80  105  94     Temp:  97.5 °F (36.4 °C)  97.1 °F (36.2 °C)  97.8 °F (36.6 °C)     Weight:  91.8 kg (202 lb 6.4 oz)  90.7 kg (200 lb)  91.6 kg (202 lb)     BMI (Calculated):  28.2  27.9  28.2           Physical Exam  GENERAL:  Well nourished/developed in no acute distress. Obese   SKIN: Turgor excellent, without wound, rash, lesion    HEENT: Normal cephalic without trauma.  Pupils equal round reactive to light. Extraocular motions full without nystagmus.   External canals nonobstructive nontender without reddness. Tymphatic membranes vale with ottoniel structures intact.   Oral cavity without growths, exudates, and moist.  Posterior pharynx without mass, obstruction, redness.  No thyromegaly, mass, tenderness, lymphadenopathy and supple.  CV: Regular rhythm.  No murmur, gallop,  edema. Posterior pulses intact/weakly.  No carotid bruits.  CHEST: No chest wall tenderness or mass.   LUNGS: Symmetric motion with clear to auscultation.   ABD: Soft, nontender without mass.    ORTHO: Symmetric extremities without swelling/point tenderness.  Full gross range of motion.  NEURO: CN 2-12 grossly intact.  Symmetric facies. 1/4 x bicep knee equal reflexes.  UE/LE   3-4/5 strength throughout.  Nonfocal use extremities. Speech clear.  Reduced light touch with monofilament, vibratory sensation with tuning fork; equal toes/distal feet.    PSYCH: Oriented x 3.  Pleasant calm, well kept.  Purposeful/directed conservation with intact short/long gross memory      Assessment/Plan     1. Uncontrolled type 2 diabetes mellitus with hyperglycemia (CMS/HCC)    2. Essential hypertension    3. Renal insufficiency    4. Other cardiac arrhythmia    5. Mixed hyperlipidemia    6. Neuropathy-offered Rx        Discussion:  Increase DM Rx by adding meal insulin  Monitor initially for hypoglycemia    Medical decision issues:    Data review above:   Rx: reviewed and decisions:   Same Rx for now otherwise    Visit today involved chronic significant medical problems or differentials and/or intensive drug monitoring: ie potential to cause serious morbidity or death:   New Medications Ordered This Visit   Medications   • Insulin Lispro, 1 Unit Dial, (HumaLOG KwikPen) 100 UNIT/ML solution pen-injector     Sig: Inject 7 Units under the skin into the appropriate area as directed 3 (Three) Times a Day With Meals for 90 days.     Dispense:  18.9 mL     Refill:  1     Needs needles too   • Insulin Pen Needle (Assure ID Safety Pen Needles) 30G X 5 MM misc     Si Device Daily.     Dispense:  100 each     Refill:  5     Orders placed:   LAB/Testing/Referrals: reviewed/orders:   Today:   No orders of the defined types were placed in this encounter.    Chronic/recurrent labs above or change to:   same     Health maintenance:   Body mass index is 28.17 kg/m².  Patient's Body mass index is 28.17 kg/m². indicating that he is overweight (BMI 25-29.9). Obesity-related health conditions include the following: diabetes mellitus. Obesity is unchanged. BMI is is above average; BMI management plan is completed. We discussed portion control and increasing exercise..    Tobacco use reviewed:    Abdullahi Gomez  reports that he has never smoked. He has never used smokeless tobacco..    There are no Patient Instructions on file for this visit.    Follow up: Return for lab;, Dr Gandhi-3 to 4m.  Future Appointments   Date Time Provider Department Center   2021  8:30 AM LAB CARMEN LEI [facility]   2021  9:30 AM Davi Gandhi MD [facility]

## 2021-09-21 RX ORDER — INSULIN ASPART 100 [IU]/ML
7 INJECTION, SOLUTION INTRAVENOUS; SUBCUTANEOUS
Qty: 20 ML | Refills: 3 | Status: SHIPPED | OUTPATIENT
Start: 2021-09-21

## 2021-09-21 RX ORDER — FLURBIPROFEN SODIUM 0.3 MG/ML
1 SOLUTION/ DROPS OPHTHALMIC 4 TIMES DAILY
Qty: 400 EACH | Refills: 3 | Status: SHIPPED | OUTPATIENT
Start: 2021-09-21

## 2021-09-21 NOTE — PROGRESS NOTES
[facility] called to advise humalog is not formulary and needs to be changed to novolog and pen needles have to BD brand. New E-rx's done.

## 2021-09-22 ENCOUNTER — TELEPHONE (OUTPATIENT)
Dept: FAMILY MEDICINE CLINIC | Facility: CLINIC | Age: 61
End: 2021-09-22

## 2021-09-22 NOTE — TELEPHONE ENCOUNTER
Pharmacy Name:  [facility] - [place], [place] - [address] AT PORTAL TO REGISTERED [facility] - [phone] [facility] [phone] FX (Pharmacy)     Pharmacy representative name:  Kareem    Pharmacy representative phone number:  [phone]     What medication are you calling in regards to: Insulin Pen Needle (B-D UF III MINI PEN NEEDLES) 31G X 5 MM          What question does the pharmacy have: They do not have these needles, need clarification : Need clarification / Need clarification - needs different needle for humalog     Who is the provider that prescribed the medication: Dr. Gandhi    Additional notes: Call w/ clarification   Ref # [id number]

## 2021-10-19 ENCOUNTER — TELEPHONE (OUTPATIENT)
Dept: FAMILY MEDICINE CLINIC | Facility: CLINIC | Age: 61
End: 2021-10-19

## 2021-10-19 DIAGNOSIS — E11.65 UNCONTROLLED TYPE 2 DIABETES MELLITUS WITH HYPERGLYCEMIA (HCC): ICD-10-CM

## 2021-10-19 NOTE — TELEPHONE ENCOUNTER
Caller: Abdullahi Gomez    Relationship: Self      Medication requested (name and dosage): Insulin Glargine (BASAGLAR KWIKPEN) 100 UNIT/ML injection pen    Requested Prescriptions:   Requested Prescriptions      No prescriptions requested or ordered in this encounter        Pharmacy where request should be sent: [facility] - [place], [place] - [address] AT Portal to Registered [facility] - [phone]  - [phone] [phone]    Additional details provided by patient: PATIENT WOULD LIKE BASAGLAR AND LANCETS SENT TO [facility]. PATIENT WOULD LIKE A 90 DAY SUPPLY IF POSSIBLE    Best call back number:[phone]    Does the patient have less than a 3 day supply:  [] Yes  [x] No    Deidre Kenney Rep   10/19/21 11:56 CDT

## 2021-10-20 RX ORDER — INSULIN GLARGINE 100 [IU]/ML
50 INJECTION, SOLUTION SUBCUTANEOUS DAILY
Qty: 1 PEN | Refills: 5 | Status: SHIPPED | OUTPATIENT
Start: 2021-10-20 | End: 2022-09-12

## 2021-10-20 NOTE — TELEPHONE ENCOUNTER
Rx Refill Note  Requested Prescriptions     Pending Prescriptions Disp Refills   • Insulin Glargine (BASAGLAR KWIKPEN) 100 UNIT/ML injection pen 1 pen 5     Sig: Inject 50 Units under the skin into the appropriate area as directed Daily.      Last office visit with prescribing clinician: 9/20/2021      Next office visit with prescribing clinician: 12/30/2021            Shraddha Westfall MA  10/20/21, 11:03 CDT

## 2021-12-13 DIAGNOSIS — E11.65 UNCONTROLLED TYPE 2 DIABETES MELLITUS WITH HYPERGLYCEMIA (HCC): ICD-10-CM

## 2021-12-14 RX ORDER — EMPAGLIFLOZIN 25 MG/1
TABLET, FILM COATED ORAL
Qty: 90 TABLET | Refills: 1 | Status: SHIPPED | OUTPATIENT
Start: 2021-12-14 | End: 2022-09-12

## 2021-12-14 NOTE — TELEPHONE ENCOUNTER
Rx Refill Note  Requested Prescriptions     Pending Prescriptions Disp Refills   • Jardiance 25 MG tablet tablet [Pharmacy Med Name: JARDIANCE TAB 25MG] 90 tablet 2     Sig: TAKE 1 TABLET DAILY      Last office visit with prescribing clinician: 9/20/2021      Next office visit with prescribing clinician: 12/30/2021            Carito Rodriguez MA  12/14/21, 07:11 CST

## 2021-12-27 ENCOUNTER — TELEMEDICINE (OUTPATIENT)
Dept: FAMILY MEDICINE CLINIC | Facility: CLINIC | Age: 61
End: 2021-12-27

## 2021-12-27 DIAGNOSIS — Z20.822 EXPOSURE TO COVID-19 VIRUS: Primary | ICD-10-CM

## 2021-12-27 PROCEDURE — 99213 OFFICE O/P EST LOW 20 MIN: CPT | Performed by: NURSE PRACTITIONER

## 2021-12-27 NOTE — PROGRESS NOTES
Subjective   Chief Complaint:  Exposure to COVID-19    History of Present Illness:  This 61 y.o. male was seen via telemedicine MyChart video conference today for evaluation after exposure Covid 19.  Reports was exposed 12/25/2021.  Reports no symptoms currently.  Reports vaccinated x2 vaccines.    No Known Allergies   Current Outpatient Medications on File Prior to Visit   Medication Sig   • aspirin 81 MG EC tablet Take 81 mg by mouth Daily.   • colesevelam (WELCHOL) 625 MG tablet TAKE 3 TABLETS BY MOUTH TWICE DAILY WITH MEALS   • Dulaglutide 3 MG/0.5ML solution pen-injector Inject 3 mg under the skin into the appropriate area as directed 1 (One) Time Per Week.   • insulin aspart (NovoLOG FlexPen) 100 UNIT/ML solution pen-injector sc pen Inject 7 Units under the skin into the appropriate area as directed 3 (Three) Times a Day With Meals.   • Insulin Glargine (BASAGLAR KWIKPEN) 100 UNIT/ML injection pen Inject 50 Units under the skin into the appropriate area as directed Daily.   • Insulin Pen Needle (B-D UF III MINI PEN NEEDLES) 31G X 5 MM misc 1 Device 4 (Four) Times a Day.   • Jardiance 25 MG tablet tablet TAKE 1 TABLET DAILY   • lisinopril (PRINIVIL,ZESTRIL) 10 MG tablet Take 1 tablet by mouth Daily.   • metFORMIN (GLUCOPHAGE) 500 MG tablet Take 2 tablets by mouth 2 (Two) Times a Day With Meals.   • tadalafil (CIALIS) 20 MG tablet Take 20 mg by mouth Daily As Needed for erectile dysfunction.     No current facility-administered medications on file prior to visit.      Past Medical, Surgical, Social, and Family History:  Past Medical History:   Diagnosis Date   • Diabetes mellitus (HCC)    • Hypertension    • Premature ventricular beats      Past Surgical History:   Procedure Laterality Date   • APPENDECTOMY     • COLONOSCOPY  10/22/2013     Social History     Socioeconomic History   • Marital status:      Spouse name: Ada   • Number of children: 3   • Years of education: 14   Tobacco Use   • Smoking  status: Never Smoker   • Smokeless tobacco: Never Used   Substance and Sexual Activity   • Alcohol use: No   • Drug use: No   • Sexual activity: Defer     Family History   Problem Relation Age of Onset   • Diabetes Mother    • Stroke Mother    • Heart disease Father      Objective   Physical Exam  Constitutional:       General: He is not in acute distress.     Appearance: Normal appearance.   Pulmonary:      Effort: Pulmonary effort is normal. No respiratory distress.   Neurological:      Mental Status: He is alert.       Assessment/Plan   Diagnoses and all orders for this visit:    1. Exposure to COVID-19 virus (Primary)  -     COVID-19,[employer] IN-HOUSE,Nasal Swab No Transport Media 3-4 HR TAT - Swab, Nasal Cavity; Future    Discussion:  Advised and educated plan of care.  With wife having underlying lung issues out of an abundance of caution he is going to get tested at 1 week.  We discussed with vaccinated patients that exposures treated differently and testing is optional, he is electing to go ahead and get tested due to the high risk situation in household and also the increased purulence and prominence of this omicron variant..    Follow-up:  Return for As Needed - Depending on Test Results - Will Call.    Electronically signed by NICHOLAS Ramon, 12/27/21, 10:27 AM CST.

## 2021-12-31 ENCOUNTER — TELEPHONE (OUTPATIENT)
Dept: FAMILY MEDICINE CLINIC | Facility: CLINIC | Age: 61
End: 2021-12-31

## 2021-12-31 NOTE — TELEPHONE ENCOUNTER
Telephone: 12/30/2021-notified patient Covid else negative.    Electronically signed by NICHOLAS Ramon, 12/31/21, 10:24 AM CST.

## 2022-01-12 RX ORDER — COLESEVELAM 180 1/1
TABLET ORAL
Qty: 540 TABLET | Refills: 1 | Status: SHIPPED | OUTPATIENT
Start: 2022-01-12 | End: 2022-12-19 | Stop reason: SDUPTHER

## 2022-01-12 NOTE — TELEPHONE ENCOUNTER
Rx Refill Note  Requested Prescriptions     Pending Prescriptions Disp Refills   • colesevelam (WELCHOL) 625 MG tablet [Pharmacy Med Name: COLESEVELAM 625MG TABLETS] 540 tablet 1     Sig: TAKE 3 TABLETS BY MOUTH TWICE DAILY WITH MEALS      Last office visit with prescribing clinician: 9/20/2021      Next office visit with prescribing clinician: Visit date not found            Carito Rodriguez MA  01/12/22, 15:56 CST

## 2022-04-07 ENCOUNTER — PRIOR AUTHORIZATION (OUTPATIENT)
Dept: FAMILY MEDICINE CLINIC | Facility: CLINIC | Age: 62
End: 2022-04-07

## 2022-08-02 DIAGNOSIS — I10 ESSENTIAL HYPERTENSION: Chronic | ICD-10-CM

## 2022-08-02 RX ORDER — LISINOPRIL 10 MG/1
10 TABLET ORAL DAILY
Qty: 90 TABLET | Refills: 3 | Status: SHIPPED | OUTPATIENT
Start: 2022-08-02

## 2022-08-02 NOTE — TELEPHONE ENCOUNTER
Rx Refill Note  Requested Prescriptions     Pending Prescriptions Disp Refills   • lisinopril (PRINIVIL,ZESTRIL) 10 MG tablet [Pharmacy Med Name: LISINOPRIL 10MG TABLETS] 90 tablet 3     Sig: TAKE 1 TABLET BY MOUTH DAILY      Last office visit with prescribing clinician: 9/20/2021      Next office visit with prescribing clinician: Visit date not found            Chelsy Murillo LPN  08/02/22, 13:50 CDT

## 2022-09-08 ENCOUNTER — OFFICE VISIT (OUTPATIENT)
Dept: GASTROENTEROLOGY | Facility: CLINIC | Age: 62
End: 2022-09-08

## 2022-09-08 VITALS
TEMPERATURE: 95.9 F | DIASTOLIC BLOOD PRESSURE: 70 MMHG | HEIGHT: 71 IN | WEIGHT: 213 LBS | SYSTOLIC BLOOD PRESSURE: 120 MMHG | BODY MASS INDEX: 29.82 KG/M2 | HEART RATE: 85 BPM | OXYGEN SATURATION: 99 %

## 2022-09-08 DIAGNOSIS — R13.19 ESOPHAGEAL DYSPHAGIA: ICD-10-CM

## 2022-09-08 DIAGNOSIS — K21.9 GASTROESOPHAGEAL REFLUX DISEASE, UNSPECIFIED WHETHER ESOPHAGITIS PRESENT: ICD-10-CM

## 2022-09-08 DIAGNOSIS — Z83.71 FAMILY HX COLONIC POLYPS: ICD-10-CM

## 2022-09-08 DIAGNOSIS — Z80.0 FAMILY HX OF COLON CANCER: ICD-10-CM

## 2022-09-08 DIAGNOSIS — Z86.010 HISTORY OF ADENOMATOUS POLYP OF COLON: Primary | ICD-10-CM

## 2022-09-08 PROCEDURE — 99214 OFFICE O/P EST MOD 30 MIN: CPT | Performed by: NURSE PRACTITIONER

## 2022-09-08 RX ORDER — SODIUM PICOSULFATE, MAGNESIUM OXIDE, AND ANHYDROUS CITRIC ACID 10; 3.5; 12 MG/160ML; G/160ML; G/160ML
160 LIQUID ORAL ONCE
Qty: 320 ML | Refills: 0 | Status: SHIPPED | OUTPATIENT
Start: 2022-09-08 | End: 2022-09-08

## 2022-09-08 NOTE — PROGRESS NOTES
[facility]    Primary Physician Davi Gandhi MD    9/8/2022    Abdullahi Gomez   1960      Chief Complaint   Patient presents with   • Colonoscopy   gerd dysphagia.     Subjective     HPI    Abdullahi Gomez is a 62 y.o. male who presents as a referral for preventative maintenance. He has no complaints of nausea or vomiting. No change in bowels. No wt loss. No BRBPR. No melena. No abdominal pain.      Dysphagia   This is intermittent for 1 year.  He points to the upper chest area where solid foods will hang. Taking a drink can help.       GERD   Intermittent for 5 years. Manifested by heartburn. Uses pepcid otc prn that does help. No certain foods.   Does drink caffeine. No tobacco.         SCANNED - COLONOSCOPY (05/30/2017 00:00) normal.   He has history of tubular adenomatous colon polyp 2013.  There is a family history of colon polyps brother. There is a family history of colon cancer brother at age 66.       No history of egd.     Past Medical History:   Diagnosis Date   • Diabetes mellitus (HCC)    • Hypertension    • Premature ventricular beats        Past Surgical History:   Procedure Laterality Date   • APPENDECTOMY     • COLONOSCOPY  10/22/2013       Outpatient Medications Marked as Taking for the 9/8/22 encounter (Office Visit) with Tia Gates APRN   Medication Sig Dispense Refill   • aspirin 81 MG EC tablet Take 81 mg by mouth Daily.     • colesevelam (WELCHOL) 625 MG tablet TAKE 3 TABLETS BY MOUTH TWICE DAILY WITH MEALS 540 tablet 1   • Dulaglutide 3 MG/0.5ML solution pen-injector Inject 3 mg under the skin into the appropriate area as directed 1 (One) Time Per Week. 12 pen 3   • insulin aspart (NovoLOG FlexPen) 100 UNIT/ML solution pen-injector sc pen Inject 7 Units under the skin into the appropriate area as directed 3 (Three) Times a Day With Meals. 20 mL 3   • Insulin Glargine (BASAGLAR KWIKPEN) 100 UNIT/ML injection pen Inject 50 Units under the skin into  Based on patient’s on going issues with deconditioning and generalized weakness due to osteoarthritis with history of TKR with recent infected right knee hardware and Orthopedic ordered 50% weight bearing on the RLE and NO KNEE MOTION allowed, making it very difficult for the pt to ambulate. Pt also has multiple wounds on RLQ abdomen, Rt anterior thigh, Rt Thigh superior posterolateral, Rt Thigh inferior posterolateral, Lt upper lateral thigh, Lt Upper medial inner thigh and right lateral thigh with wound vac. Patient will require a semi electric hospital bed. This is necessary to achieve positioning and the patients head must be elevated at 30 degrees, elevation not able to be obtained in ordinary bed. Patient also requires IVANA to prevent pressure ulcers. Bed pillows and wedges have been tried and ruled out.    Ruby Melissa PA-C  [id number] the appropriate area as directed Daily. 1 pen 5   • Insulin Pen Needle (B-D UF III MINI PEN NEEDLES) 31G X 5 MM misc 1 Device 4 (Four) Times a Day. 400 each 3   • Jardiance 25 MG tablet tablet TAKE 1 TABLET DAILY 90 tablet 1   • lisinopril (PRINIVIL,ZESTRIL) 10 MG tablet TAKE 1 TABLET BY MOUTH DAILY 90 tablet 3   • metFORMIN (GLUCOPHAGE) 500 MG tablet Take 2 tablets by mouth 2 (Two) Times a Day With Meals. 360 tablet 3       No Known Allergies    Social History     Socioeconomic History   • Marital status:      Spouse name: Arlet   • Number of children: 3   • Years of education: 14   Tobacco Use   • Smoking status: Never Smoker   • Smokeless tobacco: Never Used   Substance and Sexual Activity   • Alcohol use: No   • Drug use: No   • Sexual activity: Defer       Family History   Problem Relation Age of Onset   • Diabetes Mother    • Stroke Mother    • Heart disease Father    • Colon cancer Brother        Review of Systems   Constitutional: Negative for chills, fever and unexpected weight change.   HENT: Positive for trouble swallowing.    Respiratory: Negative for shortness of breath and wheezing.    Cardiovascular: Negative for chest pain and palpitations.   Gastrointestinal: Negative for abdominal distention, abdominal pain, anal bleeding, blood in stool, constipation, diarrhea, nausea and vomiting.       Objective     Vitals:    09/08/22 0833   BP: 120/70   Pulse: 85   Temp: 95.9 °F (35.5 °C)   SpO2: 99%         09/08/22  0833   Weight: 96.6 kg (213 lb)     Body mass index is 29.71 kg/m².    Physical Exam  Vitals reviewed.   Constitutional:       General: He is not in acute distress.  Cardiovascular:      Rate and Rhythm: Normal rate and regular rhythm.      Heart sounds: Normal heart sounds.   Pulmonary:      Effort: Pulmonary effort is normal.      Breath sounds: Normal breath sounds.   Abdominal:      General: Bowel sounds are normal. There is no distension.      Palpations: Abdomen is soft.       Based on patient’s on going issues with deconditioning and generalized weakness due to osteoarthritis with history of TKR with recent infected right knee hardware and Orthopedic ordered 50% weight bearing on the RLE and NO KNEE MOTION allowed, making it very difficult for the pt to ambulate. Pt also has multiple wounds on RLQ abdomen, Rt anterior thigh, Rt Thigh superior posterolateral, Rt Thigh inferior posterolateral, Lt upper lateral thigh, Lt Upper medial inner thigh and right lateral thigh with wound vac. Patient will require a bariatric semi electric hospital bed. This is necessary to achieve positioning and the patients head must be elevated at 30 degrees, elevation not able to be obtained in ordinary bed. Patient also requires IVANA to prevent pressure ulcers. Bed pillows and wedges have been tried and ruled out.    Ruby Melissa PA-C  [id number] Tenderness: There is no abdominal tenderness.   Skin:     General: Skin is warm and dry.   Neurological:      Mental Status: He is alert.         Imaging Results (Most Recent)     None          Assessment & Plan     Diagnoses and all orders for this visit:    1. History of adenomatous polyp of colon (Primary)  -     Case Request; Standing  -     Case Request    2. Family hx of colon cancer  -     Case Request; Standing  -     Case Request    3. Family hx colonic polyps  -     Case Request; Standing  -     Case Request    4. Esophageal dysphagia  -     Case Request; Standing  -     Case Request    5. Gastroesophageal reflux disease, unspecified whether esophagitis present  -     Case Request; Standing  -     Case Request    Other orders  -     Sod Picosulfate-Mag Ox-Cit Acd (Clenpiq) 10-3.5-12 MG-GM -GM/160ML solution; Take 160 mL by mouth 1 (One) Time for 1 dose. Take as directed  Dispense: 320 mL; Refill: 0      Plan for colonoscopy.         In regards to heartburn, recommend strict antireflux precautions.I discussed non pharmaceutical treatment of gerd.  This includes gradually losing weight to achieve ideal body wt., elevation of the head of bed by 4-6 inches, nothing to eat or drink 3 hours prior to lying down, avoiding tight clothing, stress reduction, tobacco cessation, reduction of alcohol intake, and dietary restrictions (avoiding caffeine, coffee, fatty foods, mints, chocolate, spicy foods and tomato based sauces as much as possible).  I recommend taking Pepcid on a daily basis for now instead of as needed.           In regards to dysphagia, differential diagnoses discussed.    Recommend cut food small pieces and chew well.  I recommend EGD for further evaluation and he is agreeable.              ESOPHAGOGASTRODUODENOSCOPY WITH ANESTHESIA (N/A), COLONOSCOPY WITH ANESTHESIA (N/A)  All risks, benefits, alternatives, and indications of colonoscopy procedure have been discussed with the patient. Risks to  include perforation of the colon requiring possible surgery or colostomy, risk of bleeding from biopsies or removal of colon tissue, possibility of missing a colon polyp or cancer, or adverse drug reaction.  Benefits to include the diagnosis and management of disease of the colon and rectum. Alternatives to include barium enema, radiographic evaluation, lab testing or no intervention. Pt verbalizes understanding and agrees.     Risk, benefits, and alternatives of endoscopy were explained in full.  They understand that there is a risk of bleeding, perforation, and infection.  The risk of perforation goes up with esophageal dilation.  Other options to evaluate UGI complaints could involve barium swallow or UGI series, but these would be diagnostic tests only.  Patient was given time to ask questions.  I answered them to their satisfaction and they are agreeable to proceeding    Tia Gates, NICHOLAS

## 2022-09-08 NOTE — H&P (VIEW-ONLY)
[facility]    Primary Physician Davi Gandhi MD    9/8/2022    Abdullahi Gomez   1960      Chief Complaint   Patient presents with   • Colonoscopy   gerd dysphagia.     Subjective     HPI    Abdullahi Gomez is a 62 y.o. male who presents as a referral for preventative maintenance. He has no complaints of nausea or vomiting. No change in bowels. No wt loss. No BRBPR. No melena. No abdominal pain.      Dysphagia   This is intermittent for 1 year.  He points to the upper chest area where solid foods will hang. Taking a drink can help.       GERD   Intermittent for 5 years. Manifested by heartburn. Uses pepcid otc prn that does help. No certain foods.   Does drink caffeine. No tobacco.         SCANNED - COLONOSCOPY (05/30/2017 00:00) normal.   He has history of tubular adenomatous colon polyp 2013.  There is a family history of colon polyps brother. There is a family history of colon cancer brother at age 66.       No history of egd.     Past Medical History:   Diagnosis Date   • Diabetes mellitus (HCC)    • Hypertension    • Premature ventricular beats        Past Surgical History:   Procedure Laterality Date   • APPENDECTOMY     • COLONOSCOPY  10/22/2013       Outpatient Medications Marked as Taking for the 9/8/22 encounter (Office Visit) with Tia Gates APRN   Medication Sig Dispense Refill   • aspirin 81 MG EC tablet Take 81 mg by mouth Daily.     • colesevelam (WELCHOL) 625 MG tablet TAKE 3 TABLETS BY MOUTH TWICE DAILY WITH MEALS 540 tablet 1   • Dulaglutide 3 MG/0.5ML solution pen-injector Inject 3 mg under the skin into the appropriate area as directed 1 (One) Time Per Week. 12 pen 3   • insulin aspart (NovoLOG FlexPen) 100 UNIT/ML solution pen-injector sc pen Inject 7 Units under the skin into the appropriate area as directed 3 (Three) Times a Day With Meals. 20 mL 3   • Insulin Glargine (BASAGLAR KWIKPEN) 100 UNIT/ML injection pen Inject 50 Units under the skin into  the appropriate area as directed Daily. 1 pen 5   • Insulin Pen Needle (B-D UF III MINI PEN NEEDLES) 31G X 5 MM misc 1 Device 4 (Four) Times a Day. 400 each 3   • Jardiance 25 MG tablet tablet TAKE 1 TABLET DAILY 90 tablet 1   • lisinopril (PRINIVIL,ZESTRIL) 10 MG tablet TAKE 1 TABLET BY MOUTH DAILY 90 tablet 3   • metFORMIN (GLUCOPHAGE) 500 MG tablet Take 2 tablets by mouth 2 (Two) Times a Day With Meals. 360 tablet 3       No Known Allergies    Social History     Socioeconomic History   • Marital status:      Spouse name: Arlet   • Number of children: 3   • Years of education: 14   Tobacco Use   • Smoking status: Never Smoker   • Smokeless tobacco: Never Used   Substance and Sexual Activity   • Alcohol use: No   • Drug use: No   • Sexual activity: Defer       Family History   Problem Relation Age of Onset   • Diabetes Mother    • Stroke Mother    • Heart disease Father    • Colon cancer Brother        Review of Systems   Constitutional: Negative for chills, fever and unexpected weight change.   HENT: Positive for trouble swallowing.    Respiratory: Negative for shortness of breath and wheezing.    Cardiovascular: Negative for chest pain and palpitations.   Gastrointestinal: Negative for abdominal distention, abdominal pain, anal bleeding, blood in stool, constipation, diarrhea, nausea and vomiting.       Objective     Vitals:    09/08/22 0833   BP: 120/70   Pulse: 85   Temp: 95.9 °F (35.5 °C)   SpO2: 99%         09/08/22  0833   Weight: 96.6 kg (213 lb)     Body mass index is 29.71 kg/m².    Physical Exam  Vitals reviewed.   Constitutional:       General: He is not in acute distress.  Cardiovascular:      Rate and Rhythm: Normal rate and regular rhythm.      Heart sounds: Normal heart sounds.   Pulmonary:      Effort: Pulmonary effort is normal.      Breath sounds: Normal breath sounds.   Abdominal:      General: Bowel sounds are normal. There is no distension.      Palpations: Abdomen is soft.       Tenderness: There is no abdominal tenderness.   Skin:     General: Skin is warm and dry.   Neurological:      Mental Status: He is alert.         Imaging Results (Most Recent)     None          Assessment & Plan     Diagnoses and all orders for this visit:    1. History of adenomatous polyp of colon (Primary)  -     Case Request; Standing  -     Case Request    2. Family hx of colon cancer  -     Case Request; Standing  -     Case Request    3. Family hx colonic polyps  -     Case Request; Standing  -     Case Request    4. Esophageal dysphagia  -     Case Request; Standing  -     Case Request    5. Gastroesophageal reflux disease, unspecified whether esophagitis present  -     Case Request; Standing  -     Case Request    Other orders  -     Sod Picosulfate-Mag Ox-Cit Acd (Clenpiq) 10-3.5-12 MG-GM -GM/160ML solution; Take 160 mL by mouth 1 (One) Time for 1 dose. Take as directed  Dispense: 320 mL; Refill: 0      Plan for colonoscopy.         In regards to heartburn, recommend strict antireflux precautions.I discussed non pharmaceutical treatment of gerd.  This includes gradually losing weight to achieve ideal body wt., elevation of the head of bed by 4-6 inches, nothing to eat or drink 3 hours prior to lying down, avoiding tight clothing, stress reduction, tobacco cessation, reduction of alcohol intake, and dietary restrictions (avoiding caffeine, coffee, fatty foods, mints, chocolate, spicy foods and tomato based sauces as much as possible).  I recommend taking Pepcid on a daily basis for now instead of as needed.           In regards to dysphagia, differential diagnoses discussed.    Recommend cut food small pieces and chew well.  I recommend EGD for further evaluation and he is agreeable.              ESOPHAGOGASTRODUODENOSCOPY WITH ANESTHESIA (N/A), COLONOSCOPY WITH ANESTHESIA (N/A)  All risks, benefits, alternatives, and indications of colonoscopy procedure have been discussed with the patient. Risks to  include perforation of the colon requiring possible surgery or colostomy, risk of bleeding from biopsies or removal of colon tissue, possibility of missing a colon polyp or cancer, or adverse drug reaction.  Benefits to include the diagnosis and management of disease of the colon and rectum. Alternatives to include barium enema, radiographic evaluation, lab testing or no intervention. Pt verbalizes understanding and agrees.     Risk, benefits, and alternatives of endoscopy were explained in full.  They understand that there is a risk of bleeding, perforation, and infection.  The risk of perforation goes up with esophageal dilation.  Other options to evaluate UGI complaints could involve barium swallow or UGI series, but these would be diagnostic tests only.  Patient was given time to ask questions.  I answered them to their satisfaction and they are agreeable to proceeding    Tia Gates, NICHOLAS

## 2022-09-09 DIAGNOSIS — E11.65 UNCONTROLLED TYPE 2 DIABETES MELLITUS WITH HYPERGLYCEMIA: ICD-10-CM

## 2022-09-09 PROBLEM — Z86.010 HISTORY OF ADENOMATOUS POLYP OF COLON: Status: ACTIVE | Noted: 2022-09-09

## 2022-09-09 PROBLEM — K21.9 GASTROESOPHAGEAL REFLUX DISEASE: Status: ACTIVE | Noted: 2022-09-09

## 2022-09-09 PROBLEM — Z83.719 FAMILY HX COLONIC POLYPS: Status: ACTIVE | Noted: 2022-09-09

## 2022-09-09 PROBLEM — Z80.0 FAMILY HX OF COLON CANCER: Status: ACTIVE | Noted: 2022-09-09

## 2022-09-09 PROBLEM — Z83.71 FAMILY HX COLONIC POLYPS: Status: ACTIVE | Noted: 2022-09-09

## 2022-09-09 PROBLEM — Z86.0101 HISTORY OF ADENOMATOUS POLYP OF COLON: Status: ACTIVE | Noted: 2022-09-09

## 2022-09-09 PROBLEM — R13.19 ESOPHAGEAL DYSPHAGIA: Status: ACTIVE | Noted: 2022-09-09

## 2022-09-12 RX ORDER — EMPAGLIFLOZIN 25 MG/1
TABLET, FILM COATED ORAL
Qty: 90 TABLET | Refills: 1 | Status: SHIPPED | OUTPATIENT
Start: 2022-09-12 | End: 2023-04-04

## 2022-09-12 RX ORDER — INSULIN GLARGINE 100 [IU]/ML
INJECTION, SOLUTION SUBCUTANEOUS
Qty: 15 ML | Refills: 5 | Status: SHIPPED | OUTPATIENT
Start: 2022-09-12 | End: 2022-10-14 | Stop reason: SDUPTHER

## 2022-09-12 NOTE — TELEPHONE ENCOUNTER
Rx Refill Note  Requested Prescriptions     Pending Prescriptions Disp Refills   • Insulin Glargine (BASAGLAR KWIKPEN) 100 UNIT/ML injection pen [Pharmacy Med Name: BASAGLAR KWK PEN 100U/ML] 15 mL 5     Sig: INJECT 50 UNITS            SUBCUTANEOUSLY INTO        APPROPRIATE AREA DAILY AS  DIRECTED   • Jardiance 25 MG tablet tablet [Pharmacy Med Name: JARDIANCE TAB 25MG] 90 tablet 1     Sig: TAKE 1 TABLET DAILY      Last office visit with prescribing clinician: 9/20/2021      Next office visit with prescribing clinician: Visit date not found            Chelsy Murillo, LPN  09/12/22, 16:05 CDT

## 2022-09-19 ENCOUNTER — ANESTHESIA EVENT (OUTPATIENT)
Dept: GASTROENTEROLOGY | Facility: HOSPITAL | Age: 62
End: 2022-09-19

## 2022-09-19 ENCOUNTER — TELEPHONE (OUTPATIENT)
Dept: GASTROENTEROLOGY | Facility: CLINIC | Age: 62
End: 2022-09-19

## 2022-09-19 ENCOUNTER — ANESTHESIA (OUTPATIENT)
Dept: GASTROENTEROLOGY | Facility: HOSPITAL | Age: 62
End: 2022-09-19

## 2022-09-19 ENCOUNTER — HOSPITAL ENCOUNTER (OUTPATIENT)
Facility: HOSPITAL | Age: 62
Setting detail: HOSPITAL OUTPATIENT SURGERY
Discharge: HOME | End: 2022-09-19
Attending: INTERNAL MEDICINE | Admitting: INTERNAL MEDICINE

## 2022-09-19 VITALS
TEMPERATURE: 96.9 F | BODY MASS INDEX: 30.1 KG/M2 | HEIGHT: 71 IN | HEART RATE: 76 BPM | WEIGHT: 215 LBS | RESPIRATION RATE: 23 BRPM | OXYGEN SATURATION: 99 % | DIASTOLIC BLOOD PRESSURE: 73 MMHG | SYSTOLIC BLOOD PRESSURE: 103 MMHG

## 2022-09-19 DIAGNOSIS — Z83.71 FAMILY HX COLONIC POLYPS: ICD-10-CM

## 2022-09-19 DIAGNOSIS — R13.19 ESOPHAGEAL DYSPHAGIA: ICD-10-CM

## 2022-09-19 DIAGNOSIS — K21.9 GASTROESOPHAGEAL REFLUX DISEASE, UNSPECIFIED WHETHER ESOPHAGITIS PRESENT: ICD-10-CM

## 2022-09-19 DIAGNOSIS — Z86.010 HISTORY OF ADENOMATOUS POLYP OF COLON: ICD-10-CM

## 2022-09-19 DIAGNOSIS — Z80.0 FAMILY HX OF COLON CANCER: ICD-10-CM

## 2022-09-19 LAB — GLUCOSE BLDC GLUCOMTR-MCNC: 92 MG/DL (ref 70–130)

## 2022-09-19 PROCEDURE — 88305 TISSUE EXAM BY PATHOLOGIST: CPT | Performed by: INTERNAL MEDICINE

## 2022-09-19 PROCEDURE — 43248 EGD GUIDE WIRE INSERTION: CPT | Performed by: INTERNAL MEDICINE

## 2022-09-19 PROCEDURE — 25010000002 PROPOFOL 10 MG/ML EMULSION: Performed by: NURSE ANESTHETIST, CERTIFIED REGISTERED

## 2022-09-19 PROCEDURE — 45378 DIAGNOSTIC COLONOSCOPY: CPT | Performed by: INTERNAL MEDICINE

## 2022-09-19 PROCEDURE — 82962 GLUCOSE BLOOD TEST: CPT

## 2022-09-19 PROCEDURE — 43239 EGD BIOPSY SINGLE/MULTIPLE: CPT | Performed by: INTERNAL MEDICINE

## 2022-09-19 RX ORDER — SODIUM CHLORIDE 9 MG/ML
500 INJECTION, SOLUTION INTRAVENOUS CONTINUOUS PRN
Status: DISCONTINUED | OUTPATIENT
Start: 2022-09-19 | End: 2022-09-19 | Stop reason: HOSPADM

## 2022-09-19 RX ORDER — PROPOFOL 10 MG/ML
VIAL (ML) INTRAVENOUS AS NEEDED
Status: DISCONTINUED | OUTPATIENT
Start: 2022-09-19 | End: 2022-09-19 | Stop reason: SURG

## 2022-09-19 RX ORDER — LIDOCAINE HYDROCHLORIDE 20 MG/ML
INJECTION, SOLUTION EPIDURAL; INFILTRATION; INTRACAUDAL; PERINEURAL AS NEEDED
Status: DISCONTINUED | OUTPATIENT
Start: 2022-09-19 | End: 2022-09-19 | Stop reason: SURG

## 2022-09-19 RX ORDER — PANTOPRAZOLE SODIUM 40 MG/1
40 TABLET, DELAYED RELEASE ORAL DAILY
Qty: 30 TABLET | Refills: 11 | Status: ON HOLD | OUTPATIENT
Start: 2022-09-19 | End: 2023-04-06 | Stop reason: SDUPTHER

## 2022-09-19 RX ORDER — ONDANSETRON 2 MG/ML
4 INJECTION INTRAMUSCULAR; INTRAVENOUS ONCE AS NEEDED
Status: DISCONTINUED | OUTPATIENT
Start: 2022-09-19 | End: 2022-09-19 | Stop reason: HOSPADM

## 2022-09-19 RX ORDER — LIDOCAINE HYDROCHLORIDE 10 MG/ML
0.5 INJECTION, SOLUTION EPIDURAL; INFILTRATION; INTRACAUDAL; PERINEURAL ONCE AS NEEDED
Status: DISCONTINUED | OUTPATIENT
Start: 2022-09-19 | End: 2022-09-19 | Stop reason: HOSPADM

## 2022-09-19 RX ORDER — SODIUM CHLORIDE 0.9 % (FLUSH) 0.9 %
10 SYRINGE (ML) INJECTION AS NEEDED
Status: DISCONTINUED | OUTPATIENT
Start: 2022-09-19 | End: 2022-09-19 | Stop reason: HOSPADM

## 2022-09-19 RX ADMIN — SODIUM CHLORIDE 500 ML: 9 INJECTION, SOLUTION INTRAVENOUS at 11:40

## 2022-09-19 RX ADMIN — LIDOCAINE HYDROCHLORIDE 50 MG: 20 INJECTION, SOLUTION EPIDURAL; INFILTRATION; INTRACAUDAL; PERINEURAL at 12:55

## 2022-09-19 RX ADMIN — PROPOFOL 500 MG: 10 INJECTION, EMULSION INTRAVENOUS at 12:55

## 2022-09-19 NOTE — ANESTHESIA PREPROCEDURE EVALUATION
Anesthesia Evaluation     Patient summary reviewed   no history of anesthetic complications:  NPO Solid Status: > 8 hours  NPO Liquid Status: > 2 hours           Airway   Mallampati: II  TM distance: >3 FB  Neck ROM: full  No difficulty expected  Dental - normal exam     Pulmonary - negative pulmonary ROS   Cardiovascular   Exercise tolerance: good (4-7 METS)    (+) hypertension, hyperlipidemia,       Neuro/Psych- negative ROS  GI/Hepatic/Renal/Endo    (+)  GERD,  diabetes mellitus using insulin,     Musculoskeletal     Abdominal    Substance History      OB/GYN          Other                        Anesthesia Plan    ASA 3     MAC       Anesthetic plan, risks, benefits, and alternatives have been provided, discussed and informed consent has been obtained with: patient.        CODE STATUS:

## 2022-09-19 NOTE — ANESTHESIA POSTPROCEDURE EVALUATION
Patient: Abdullahi MACE    Procedure Summary     Date: 09/19/22 Room / Location: [facility] ENDOSCOPY 5 / [facility] END[facility]    Anesthesia Start: 1248 Anesthesia Stop: 1324    Procedures:       ESOPHAGOGASTRODUODENOSCOPY WITH ANESTHESIA (N/A )      COLONOSCOPY WITH ANESTHESIA (N/A ) Diagnosis:       History of adenomatous polyp of colon      Family hx of colon cancer      Family hx colonic polyps      Esophageal dysphagia      Gastroesophageal reflux disease, unspecified whether esophagitis present      (History of adenomatous polyp of colon [Z86.010])      (Family hx of colon cancer [Z80.0])      (Family hx colonic polyps [Z83.71])      (Esophageal dysphagia [R13.19])      (Gastroesophageal reflux disease, unspecified whether esophagitis present [K21.9])    Surgeons: Rian Nobles MD Provider: Rob Flynn CRNA    Anesthesia Type: MAC ASA Status: 3          Anesthesia Type: MAC    Vitals  Vitals Value Taken Time   BP 83/58 09/19/22 1326   Temp     Pulse 84 09/19/22 1327   Resp     SpO2 95 % 09/19/22 1327   Vitals shown include unvalidated device data.        Post Anesthesia Care and Evaluation    Patient location during evaluation: PHASE II  Patient participation: complete - patient participated  Level of consciousness: responsive to light touch  Pain management: adequate    Airway patency: patent  Anesthetic complications: No anesthetic complications  PONV Status: none  Cardiovascular status: acceptable  Respiratory status: acceptable  Hydration status: acceptable

## 2022-09-19 NOTE — TELEPHONE ENCOUNTER
He underwent endoscopy today.  Colonoscopy as well which was unremarkable.  He has a history of reflux which he takes twice daily Pepcid.  He was having little bit dysphagia and and a funny sensation.  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  I placed him on pantoprazole and recommend repeat endoscopy in 2 months.    Please arrange for that follow-up endoscopy and send me a copy of this note at that time.

## 2022-09-20 ENCOUNTER — PREP FOR SURGERY (OUTPATIENT)
Dept: OTHER | Facility: HOSPITAL | Age: 62
End: 2022-09-20

## 2022-09-20 DIAGNOSIS — Z87.19 H/O GASTROESOPHAGEAL REFLUX (GERD): Primary | ICD-10-CM

## 2022-09-25 LAB
CYTO UR: NORMAL
LAB AP CASE REPORT: NORMAL
Lab: NORMAL
PATH REPORT.FINAL DX SPEC: NORMAL
PATH REPORT.GROSS SPEC: NORMAL

## 2022-09-27 ENCOUNTER — TELEPHONE (OUTPATIENT)
Dept: GASTROENTEROLOGY | Facility: CLINIC | Age: 62
End: 2022-09-27

## 2022-09-27 NOTE — TELEPHONE ENCOUNTER
In addition to the note I sent you earlier asking you to give me at the time of endoscopy, give me this note as well since his pathology showed Damon's disease when he has his follow-up endoscopy in November.  We can discuss the Damon's further at that time.  Thanks

## 2022-10-14 ENCOUNTER — HOSPITAL ENCOUNTER (INPATIENT)
Facility: HOSPITAL | Age: 62
LOS: 1 days | Discharge: HOME | End: 2022-10-15
Attending: EMERGENCY MEDICINE

## 2022-10-14 ENCOUNTER — APPOINTMENT (OUTPATIENT)
Dept: CT IMAGING | Facility: HOSPITAL | Age: 62
End: 2022-10-14

## 2022-10-14 ENCOUNTER — APPOINTMENT (OUTPATIENT)
Dept: GENERAL RADIOLOGY | Facility: HOSPITAL | Age: 62
End: 2022-10-14

## 2022-10-14 DIAGNOSIS — I63.9 CEREBROVASCULAR ACCIDENT (CVA), UNSPECIFIED MECHANISM: Primary | ICD-10-CM

## 2022-10-14 DIAGNOSIS — E11.65 UNCONTROLLED TYPE 2 DIABETES MELLITUS WITH HYPERGLYCEMIA: ICD-10-CM

## 2022-10-14 LAB
ABO GROUP BLD: NORMAL
ALBUMIN SERPL-MCNC: 4.5 G/DL (ref 3.5–5.2)
ALBUMIN/GLOB SERPL: 1.5 G/DL
ALP SERPL-CCNC: 109 U/L (ref 39–117)
ALT SERPL W P-5'-P-CCNC: 22 U/L (ref 1–41)
ANION GAP SERPL CALCULATED.3IONS-SCNC: 14 MMOL/L (ref 5–15)
APTT PPP: 31 SECONDS (ref 24.1–35)
AST SERPL-CCNC: 26 U/L (ref 1–40)
BASOPHILS # BLD AUTO: 0.03 10*3/MM3 (ref 0–0.2)
BASOPHILS NFR BLD AUTO: 0.4 % (ref 0–1.5)
BILIRUB SERPL-MCNC: 0.3 MG/DL (ref 0–1.2)
BLD GP AB SCN SERPL QL: NEGATIVE
BUN SERPL-MCNC: 18 MG/DL (ref 8–23)
BUN/CREAT SERPL: 15.1 (ref 7–25)
CALCIUM SPEC-SCNC: 9.6 MG/DL (ref 8.6–10.5)
CHLORIDE SERPL-SCNC: 100 MMOL/L (ref 98–107)
CO2 SERPL-SCNC: 22 MMOL/L (ref 22–29)
CREAT SERPL-MCNC: 1.19 MG/DL (ref 0.76–1.27)
DEPRECATED RDW RBC AUTO: 40.1 FL (ref 37–54)
EGFRCR SERPLBLD CKD-EPI 2021: 69.9 ML/MIN/1.73
EOSINOPHIL # BLD AUTO: 0.39 10*3/MM3 (ref 0–0.4)
EOSINOPHIL NFR BLD AUTO: 5 % (ref 0.3–6.2)
ERYTHROCYTE [DISTWIDTH] IN BLOOD BY AUTOMATED COUNT: 12.8 % (ref 12.3–15.4)
GLOBULIN UR ELPH-MCNC: 3.1 GM/DL
GLUCOSE BLDC GLUCOMTR-MCNC: 154 MG/DL (ref 70–130)
GLUCOSE BLDC GLUCOMTR-MCNC: 200 MG/DL (ref 70–130)
GLUCOSE SERPL-MCNC: 206 MG/DL (ref 65–99)
HCT VFR BLD AUTO: 47.5 % (ref 37.5–51)
HGB BLD-MCNC: 16.9 G/DL (ref 13–17.7)
HOLD SPECIMEN: NORMAL
HOLD SPECIMEN: NORMAL
IMM GRANULOCYTES # BLD AUTO: 0.02 10*3/MM3 (ref 0–0.05)
IMM GRANULOCYTES NFR BLD AUTO: 0.3 % (ref 0–0.5)
INR PPP: 0.96 (ref 0.91–1.09)
LYMPHOCYTES # BLD AUTO: 2.39 10*3/MM3 (ref 0.7–3.1)
LYMPHOCYTES NFR BLD AUTO: 30.9 % (ref 19.6–45.3)
MCH RBC QN AUTO: 30.7 PG (ref 26.6–33)
MCHC RBC AUTO-ENTMCNC: 35.6 G/DL (ref 31.5–35.7)
MCV RBC AUTO: 86.4 FL (ref 79–97)
MONOCYTES # BLD AUTO: 0.78 10*3/MM3 (ref 0.1–0.9)
MONOCYTES NFR BLD AUTO: 10.1 % (ref 5–12)
NEUTROPHILS NFR BLD AUTO: 4.13 10*3/MM3 (ref 1.7–7)
NEUTROPHILS NFR BLD AUTO: 53.3 % (ref 42.7–76)
NRBC BLD AUTO-RTO: 0 /100 WBC (ref 0–0.2)
PLATELET # BLD AUTO: 182 10*3/MM3 (ref 140–450)
PMV BLD AUTO: 9.8 FL (ref 6–12)
POTASSIUM SERPL-SCNC: 4.6 MMOL/L (ref 3.5–5.2)
PROT SERPL-MCNC: 7.6 G/DL (ref 6–8.5)
PROTHROMBIN TIME: 12.4 SECONDS (ref 11.9–14.6)
RBC # BLD AUTO: 5.5 10*6/MM3 (ref 4.14–5.8)
RH BLD: POSITIVE
SODIUM SERPL-SCNC: 136 MMOL/L (ref 136–145)
T&S EXPIRATION DATE: NORMAL
TROPONIN T SERPL-MCNC: <0.01 NG/ML (ref 0–0.03)
WBC NRBC COR # BLD: 7.74 10*3/MM3 (ref 3.4–10.8)
WHOLE BLOOD HOLD COAG: NORMAL
WHOLE BLOOD HOLD SPECIMEN: NORMAL

## 2022-10-14 PROCEDURE — 71045 X-RAY EXAM CHEST 1 VIEW: CPT

## 2022-10-14 PROCEDURE — 0 IOPAMIDOL PER 1 ML: Performed by: EMERGENCY MEDICINE

## 2022-10-14 PROCEDURE — 82962 GLUCOSE BLOOD TEST: CPT

## 2022-10-14 PROCEDURE — 85610 PROTHROMBIN TIME: CPT | Performed by: EMERGENCY MEDICINE

## 2022-10-14 PROCEDURE — 86901 BLOOD TYPING SEROLOGIC RH(D): CPT | Performed by: EMERGENCY MEDICINE

## 2022-10-14 PROCEDURE — 4A03X5D MEASUREMENT OF ARTERIAL FLOW, INTRACRANIAL, EXTERNAL APPROACH: ICD-10-PCS | Performed by: INTERNAL MEDICINE

## 2022-10-14 PROCEDURE — 99285 EMERGENCY DEPT VISIT HI MDM: CPT

## 2022-10-14 PROCEDURE — 70496 CT ANGIOGRAPHY HEAD: CPT

## 2022-10-14 PROCEDURE — 70498 CT ANGIOGRAPHY NECK: CPT

## 2022-10-14 PROCEDURE — 86850 RBC ANTIBODY SCREEN: CPT | Performed by: EMERGENCY MEDICINE

## 2022-10-14 PROCEDURE — 86900 BLOOD TYPING SEROLOGIC ABO: CPT | Performed by: EMERGENCY MEDICINE

## 2022-10-14 PROCEDURE — 99291 CRITICAL CARE FIRST HOUR: CPT | Performed by: PSYCHIATRY & NEUROLOGY

## 2022-10-14 PROCEDURE — 84484 ASSAY OF TROPONIN QUANT: CPT | Performed by: EMERGENCY MEDICINE

## 2022-10-14 PROCEDURE — 70450 CT HEAD/BRAIN W/O DYE: CPT

## 2022-10-14 PROCEDURE — 80053 COMPREHEN METABOLIC PANEL: CPT | Performed by: EMERGENCY MEDICINE

## 2022-10-14 PROCEDURE — 85025 COMPLETE CBC W/AUTO DIFF WBC: CPT | Performed by: EMERGENCY MEDICINE

## 2022-10-14 PROCEDURE — 85730 THROMBOPLASTIN TIME PARTIAL: CPT | Performed by: EMERGENCY MEDICINE

## 2022-10-14 PROCEDURE — 63710000001 INSULIN DETEMIR PER 5 UNITS: Performed by: INTERNAL MEDICINE

## 2022-10-14 RX ORDER — SODIUM CHLORIDE 0.9 % (FLUSH) 0.9 %
10 SYRINGE (ML) INJECTION AS NEEDED
Status: DISCONTINUED | OUTPATIENT
Start: 2022-10-14 | End: 2022-10-15 | Stop reason: HOSPADM

## 2022-10-14 RX ORDER — NICOTINE POLACRILEX 4 MG
15 LOZENGE BUCCAL
Status: DISCONTINUED | OUTPATIENT
Start: 2022-10-14 | End: 2022-10-15 | Stop reason: HOSPADM

## 2022-10-14 RX ORDER — SODIUM CHLORIDE 0.9 % (FLUSH) 0.9 %
10 SYRINGE (ML) INJECTION EVERY 12 HOURS SCHEDULED
Status: DISCONTINUED | OUTPATIENT
Start: 2022-10-14 | End: 2022-10-15 | Stop reason: HOSPADM

## 2022-10-14 RX ORDER — ACETAMINOPHEN 325 MG/1
650 TABLET ORAL EVERY 4 HOURS PRN
Status: DISCONTINUED | OUTPATIENT
Start: 2022-10-14 | End: 2022-10-15 | Stop reason: HOSPADM

## 2022-10-14 RX ORDER — CLOPIDOGREL BISULFATE 75 MG/1
75 TABLET ORAL DAILY
Status: DISCONTINUED | OUTPATIENT
Start: 2022-10-14 | End: 2022-10-15 | Stop reason: HOSPADM

## 2022-10-14 RX ORDER — ACETAMINOPHEN 650 MG/1
650 SUPPOSITORY RECTAL EVERY 4 HOURS PRN
Status: DISCONTINUED | OUTPATIENT
Start: 2022-10-14 | End: 2022-10-15 | Stop reason: HOSPADM

## 2022-10-14 RX ORDER — INSULIN GLARGINE 100 [IU]/ML
50 INJECTION, SOLUTION SUBCUTANEOUS DAILY
Qty: 45 ML | Refills: 3 | Status: SHIPPED | OUTPATIENT
Start: 2022-10-14 | End: 2023-01-17

## 2022-10-14 RX ORDER — ASPIRIN 300 MG/1
300 SUPPOSITORY RECTAL DAILY
Status: DISCONTINUED | OUTPATIENT
Start: 2022-10-14 | End: 2022-10-15 | Stop reason: HOSPADM

## 2022-10-14 RX ORDER — INSULIN LISPRO 100 [IU]/ML
7 INJECTION, SOLUTION INTRAVENOUS; SUBCUTANEOUS
Status: DISCONTINUED | OUTPATIENT
Start: 2022-10-15 | End: 2022-10-15 | Stop reason: HOSPADM

## 2022-10-14 RX ORDER — INSULIN LISPRO 100 [IU]/ML
0-9 INJECTION, SOLUTION INTRAVENOUS; SUBCUTANEOUS
Status: DISCONTINUED | OUTPATIENT
Start: 2022-10-15 | End: 2022-10-15 | Stop reason: HOSPADM

## 2022-10-14 RX ORDER — DEXTROSE MONOHYDRATE 25 G/50ML
25 INJECTION, SOLUTION INTRAVENOUS
Status: DISCONTINUED | OUTPATIENT
Start: 2022-10-14 | End: 2022-10-15 | Stop reason: HOSPADM

## 2022-10-14 RX ORDER — ACETAMINOPHEN 160 MG/5ML
650 SOLUTION ORAL EVERY 4 HOURS PRN
Status: DISCONTINUED | OUTPATIENT
Start: 2022-10-14 | End: 2022-10-15 | Stop reason: HOSPADM

## 2022-10-14 RX ORDER — ATORVASTATIN CALCIUM 40 MG/1
80 TABLET, FILM COATED ORAL NIGHTLY
Status: DISCONTINUED | OUTPATIENT
Start: 2022-10-14 | End: 2022-10-15 | Stop reason: HOSPADM

## 2022-10-14 RX ORDER — ASPIRIN 81 MG/1
81 TABLET, CHEWABLE ORAL DAILY
Status: DISCONTINUED | OUTPATIENT
Start: 2022-10-14 | End: 2022-10-15 | Stop reason: HOSPADM

## 2022-10-14 RX ORDER — INSULIN ASPART 100 [IU]/ML
7 INJECTION, SOLUTION INTRAVENOUS; SUBCUTANEOUS
Status: DISCONTINUED | OUTPATIENT
Start: 2022-10-15 | End: 2022-10-14 | Stop reason: SDUPTHER

## 2022-10-14 RX ORDER — PANTOPRAZOLE SODIUM 40 MG/1
40 TABLET, DELAYED RELEASE ORAL DAILY
Status: DISCONTINUED | OUTPATIENT
Start: 2022-10-14 | End: 2022-10-15 | Stop reason: HOSPADM

## 2022-10-14 RX ORDER — ONDANSETRON 2 MG/ML
4 INJECTION INTRAMUSCULAR; INTRAVENOUS EVERY 6 HOURS PRN
Status: DISCONTINUED | OUTPATIENT
Start: 2022-10-14 | End: 2022-10-15 | Stop reason: HOSPADM

## 2022-10-14 RX ADMIN — INSULIN DETEMIR 30 UNITS: 100 INJECTION, SOLUTION SUBCUTANEOUS at 22:41

## 2022-10-14 RX ADMIN — IOPAMIDOL 125 ML: 755 INJECTION, SOLUTION INTRAVENOUS at 17:28

## 2022-10-14 RX ADMIN — Medication 10 ML: at 22:39

## 2022-10-14 RX ADMIN — CLOPIDOGREL 75 MG: 75 TABLET, FILM COATED ORAL at 18:50

## 2022-10-14 RX ADMIN — ATORVASTATIN CALCIUM 80 MG: 40 TABLET, FILM COATED ORAL at 22:39

## 2022-10-14 NOTE — ED PROVIDER NOTES
Subjective   History of Present Illness  Patient is a 62-year-old male who presents to the ER with strokelike symptoms.  Patient states that 1 hour ago he had the sudden onset of left-sided numbness.  Patient states his arm face and leg are all involved.  Patient states his symptoms have persisted so he came here.  Patient states he is also a little weak to his left arm and leg.  He denies any recent trauma.  He denies any right-sided symptoms.  He denies any fever, chest pain, shortness of air, abdominal pain,  nausea vomiting diarrhea, urinary changes.        Review of Systems   HENT: Negative.    Eyes: Negative.    Respiratory: Negative.    Cardiovascular: Negative.    Gastrointestinal: Negative.    Endocrine: Negative.    Genitourinary: Negative.    Musculoskeletal: Negative.    Skin: Negative.    Allergic/Immunologic: Negative.    Neurological: Positive for weakness and numbness.   Hematological: Negative.    Psychiatric/Behavioral: Negative.    All other systems reviewed and are negative.      Past Medical History:   Diagnosis Date   • Cerebrovascular accident (CVA), unspecified mechanism (HCC) 10/14/2022   • Diabetes mellitus (HCC)    • Hypertension    • Premature ventricular beats        No Known Allergies    Past Surgical History:   Procedure Laterality Date   • APPENDECTOMY     • COLONOSCOPY  10/22/2013   • COLONOSCOPY N/A 9/19/2022    Procedure: COLONOSCOPY WITH ANESTHESIA;  Surgeon: Rian Nobles MD;  Location: [facility] ENDOSCOPY;  Service: Gastroenterology;  Laterality: N/A;  preop; hx of polyps  postop; hemmhroids   PCP Davi Gandhi    • ENDOSCOPY N/A 9/19/2022    Procedure: ESOPHAGOGASTRODUODENOSCOPY WITH ANESTHESIA;  Surgeon: Rian Nobles MD;  Location: [facility] ENDOSCOPY;  Service: Gastroenterology;  Laterality: N/A;  preop; dysphagia  postop; r/o barretts; esophagitis   PCP Davi Gandhi        Family History   Problem Relation Age of Onset   • Diabetes Mother    • Stroke Mother    • Heart  disease Father    • Colon cancer Brother        Social History     Socioeconomic History   • Marital status:      Spouse name: Arlet   • Number of children: 3   • Years of education: 14   Tobacco Use   • Smoking status: Never   • Smokeless tobacco: Never   Vaping Use   • Vaping Use: Never used   Substance and Sexual Activity   • Alcohol use: No   • Drug use: No   • Sexual activity: Defer           Objective   Physical Exam  Vitals and nursing note reviewed.   Constitutional:       Appearance: He is well-developed.   HENT:      Head: Normocephalic and atraumatic.   Eyes:      Conjunctiva/sclera: Conjunctivae normal.      Pupils: Pupils are equal, round, and reactive to light.   Cardiovascular:      Rate and Rhythm: Normal rate and regular rhythm.      Heart sounds: Normal heart sounds.   Pulmonary:      Effort: Pulmonary effort is normal.      Breath sounds: Normal breath sounds.   Abdominal:      Palpations: Abdomen is soft.      Tenderness: There is no abdominal tenderness.   Musculoskeletal:         General: No deformity. Normal range of motion.      Cervical back: Normal range of motion.   Skin:     General: Skin is warm.   Neurological:      Mental Status: He is alert and oriented to person, place, and time.      Cranial Nerves: Cranial nerves 2-12 are intact.      Coordination: Coordination is intact.      Comments: Decreased sensation to the left face, arm, leg.  Decreased strength to the left leg and arm.   Psychiatric:         Behavior: Behavior normal.         Procedures           ED Course          A code stroke was called.  Rob Simmons with neurology evaluated the patient.  Patient is not a tPA candidate due to low NIH stroke scale.    EKG: Sinus rhythm with a rate of 88 with PVCs, no acute ischemia     NIH Stroke Scale/Score (NIHSS) - MDCalc  Calculated on Oct 14 2022 7:03 PM  1 points -> NIH Stroke Scale     Lab Results (last 24 hours)     Procedure Component Value Units Date/Time    CBC &  Differential [[id number]]  (Normal) Collected: 10/14/22 1719    Specimen: Blood Updated: 10/14/22 1734    Narrative:      The following orders were created for panel order CBC & Differential.  Procedure                               Abnormality         Status                     ---------                               -----------         ------                     CBC Auto Differential[[id number]]        Normal              Final result                 Please view results for these tests on the individual orders.    Comprehensive Metabolic Panel [[id number]]  (Abnormal) Collected: 10/14/22 1719    Specimen: Blood Updated: 10/14/22 1754     Glucose 206 mg/dL      BUN 18 mg/dL      Creatinine 1.19 mg/dL      Sodium 136 mmol/L      Potassium 4.6 mmol/L      Comment: Slight hemolysis detected by analyzer. Results may be affected.        Chloride 100 mmol/L      CO2 22.0 mmol/L      Calcium 9.6 mg/dL      Total Protein 7.6 g/dL      Albumin 4.50 g/dL      ALT (SGPT) 22 U/L      AST (SGOT) 26 U/L      Comment: Slight hemolysis detected by analyzer. Results may be affected.        Alkaline Phosphatase 109 U/L      Total Bilirubin 0.3 mg/dL      Globulin 3.1 gm/dL      A/G Ratio 1.5 g/dL      BUN/Creatinine Ratio 15.1     Anion Gap 14.0 mmol/L      eGFR 69.9 mL/min/1.73      Comment: National Kidney Foundation and American Society of Nephrology (ASN) Task Force recommended calculation based on the Chronic Kidney Disease Epidemiology Collaboration (CKD-EPI) equation refit without adjustment for race.       Narrative:      GFR Normal >60  Chronic Kidney Disease <60  Kidney Failure <15      Protime-INR [[id number]]  (Normal) Collected: 10/14/22 1719    Specimen: Blood Updated: 10/14/22 1745     Protime 12.4 Seconds      INR 0.96    aPTT [[id number]]  (Normal) Collected: 10/14/22 1719    Specimen: Blood Updated: 10/14/22 1745     PTT 31.0 seconds     Troponin [[id number]]  (Normal) Collected: 10/14/22 1719    Specimen: Blood Updated:  10/14/22 1752     Troponin T <0.010 ng/mL     Narrative:      Troponin T Reference Range:  <= 0.03 ng/mL-   Negative for AMI  >0.03 ng/mL-     Abnormal for myocardial necrosis.  Clinicians would have to utilize clinical acumen, EKG, Troponin and serial changes to determine if it is an Acute Myocardial Infarction or myocardial injury due to an underlying chronic condition.       Results may be falsely decreased if patient taking Biotin.      CBC Auto Differential [[id number]]  (Normal) Collected: 10/14/22 1719    Specimen: Blood Updated: 10/14/22 1734     WBC 7.74 10*3/mm3      RBC 5.50 10*6/mm3      Hemoglobin 16.9 g/dL      Hematocrit 47.5 %      MCV 86.4 fL      MCH 30.7 pg      MCHC 35.6 g/dL      RDW 12.8 %      RDW-SD 40.1 fl      MPV 9.8 fL      Platelets 182 10*3/mm3      Neutrophil % 53.3 %      Lymphocyte % 30.9 %      Monocyte % 10.1 %      Eosinophil % 5.0 %      Basophil % 0.4 %      Immature Grans % 0.3 %      Neutrophils, Absolute 4.13 10*3/mm3      Lymphocytes, Absolute 2.39 10*3/mm3      Monocytes, Absolute 0.78 10*3/mm3      Eosinophils, Absolute 0.39 10*3/mm3      Basophils, Absolute 0.03 10*3/mm3      Immature Grans, Absolute 0.02 10*3/mm3      nRBC 0.0 /100 WBC     POC Glucose Once [[id number]]  (Abnormal) Collected: 10/14/22 1724    Specimen: Blood Updated: 10/14/22 1737     Glucose 200 mg/dL      Comment: : [id number] Samir Ashrafer ID: [id number]           CT Angiogram Head w AI Analysis of LVO   Final Result   1. No large vessel occlusion. No aneurysm or dissection.   This report was finalized on 10/14/2022 17:49 by Dr. Letitia Cornejo MD.      CT Angiogram Neck   Final Result   1. Widely patent bilateral extracranial carotid arteries. 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 Basilar artery supplied by the   left vertebral artery. No focal high-grade stenosis. No aneurysm or   dissection.   This report was finalized on 10/14/2022 17:45 by Dr. Letitia Cornejo MD.      CT  Head Without Contrast Stroke Protocol   Final Result   1. No acute intracranial abnormality identified. No intracranial   hemorrhage. No acute signs of ischemia based on CT exam.       Comments: Findings called to Magda in the ER at 5:26 PM on 10/14/2022   This report was finalized on 10/14/2022 17:26 by Dr. Letitia Cornejo MD.      XR Chest 1 View    (Results Pending)   MRI Brain Without Contrast    (Results Pending)                    Labs showed hyperglycemia.  Otherwise negative.  CT scan of the head showed no acute findings.  CTA of head and neck showed no large vessel occlusions.  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  There is no high-grade stenosis.  There is no dissection or aneurysm.  Dr. Chisholm with neurology has also evaluated the patient.  Patient was then admitted to the hospitalist service for further treatment and work-up.          MDM    Final diagnoses:   Cerebrovascular accident (CVA), unspecified mechanism (HCC)       ED Disposition  ED Disposition     ED Disposition   Decision to Admit    Condition   --    Comment   Level of Care: Telemetry [5]   Diagnosis: Cerebrovascular accident (CVA), unspecified mechanism (HCC) [[id number]]   Admitting Physician: ALLEGRA JOHNSON [[id number]]   Attending Physician: ALLEGRA JOHNSON [[id number]]   Certification: I Certify That Inpatient Hospital Services Are Medically Necessary For Greater Than 2 Midnights               No follow-up provider specified.       Medication List      No changes were made to your prescriptions during this visit.          Katy Velasquez MD  10/14/22 1804       Katy Velasquez MD  10/14/22 1812       Katy Velasquez MD  10/14/22 2040

## 2022-10-14 NOTE — H&P
"    [facility] Medicine Services  HISTORY AND PHYSICAL    Date of Admission: 10/14/2022  Primary Care Physician: Davi Gandhi MD    Subjective     Chief Complaint: Left sided numbness and weakness    History of Present Illness  Patient is a 62-year-old  male with past medical history significant for insulin-dependent diabetes, hypertension, gastroesophageal reflux disease the presented to our hospital given concern for strokelike symptoms.  Patient reports that he was out at a local pumpkin patch with his grandchildren, and reports that he was trying to gather one of his grandkids back to their car as it was time to leave.  He states that he had onset of left-sided numbness.  He describes numbness that was on the left side of his face and extending all the way down his left upper extremity and left lower extremity.  He reports that he was able to walk, but states \"I really had to think about it.\"  He denied any speech difficulty.  He denied any headache.  Denied any vision change.  He reported that his left side did feel a little bit heavy and weak.  He feels like that his symptoms have improved since being in the emergency department, but he also states that he is not back to normal.    Neurology has already evaluated the patient in the emergency department.  I discussion did take place regarding consideration of tPA-this mode of therapy was not pursued.    Patient does report that he takes a baby aspirin nightly on an outpatient basis.  He does not smoke.  He denies any recent medication adjustments.  Denies any chest pain or chest pressure.  Denies any shortness of breath.    Review of Systems     Otherwise complete ROS reviewed and negative except as mentioned in the HPI.    Past Medical History:   Past Medical History:   Diagnosis Date   • Cerebrovascular accident (CVA), unspecified mechanism (HCC) 10/14/2022   • Diabetes mellitus (HCC)    • Hypertension    • " Premature ventricular beats      Past Surgical History:  Past Surgical History:   Procedure Laterality Date   • APPENDECTOMY     • COLONOSCOPY  10/22/2013   • COLONOSCOPY N/A 9/19/2022    Procedure: COLONOSCOPY WITH ANESTHESIA;  Surgeon: Rian Nobles MD;  Location: [facility] ENDOSCOPY;  Service: Gastroenterology;  Laterality: N/A;  preop; hx of polyps  postop; hemmhroids   PCP Davi Gandhi    • ENDOSCOPY N/A 9/19/2022    Procedure: ESOPHAGOGASTRODUODENOSCOPY WITH ANESTHESIA;  Surgeon: Rian Nobles MD;  Location: [facility] ENDOSCOPY;  Service: Gastroenterology;  Laterality: N/A;  preop; dysphagia  postop; r/o barretts; esophagitis   PCP Davi Gandhi      Social History:  reports that he has never smoked. He has never used smokeless tobacco. He reports that he does not drink alcohol and does not use drugs.    Family History: family history includes Colon cancer in his brother; Diabetes in his mother; Heart disease in his father; Stroke in his mother.       Allergies:  No Known Allergies    Medications:  Prior to Admission medications    Medication Sig Start Date End Date Taking? Authorizing Provider   Insulin Glargine (BASAGLAR KWIKPEN) 100 UNIT/ML injection pen Inject 50 Units under the skin into the appropriate area as directed Daily for 90 days. 10/14/22 1/12/23  Zack Copeland APRN   aspirin 81 MG EC tablet Take 81 mg by mouth Daily.    Provider, MD Oleksandr   colesevelam (WELCHOL) 625 MG tablet TAKE 3 TABLETS BY MOUTH TWICE DAILY WITH MEALS 1/12/22   Zack Copeland APRN   Dulaglutide 3 MG/0.5ML solution pen-injector Inject 3 mg under the skin into the appropriate area as directed 1 (One) Time Per Week. 12/28/20   Davi Gandhi MD   insulin aspart (NovoLOG FlexPen) 100 UNIT/ML solution pen-injector sc pen Inject 7 Units under the skin into the appropriate area as directed 3 (Three) Times a Day With Meals. 9/21/21   Davi Gandhi MD   Insulin Pen Needle (B-D UF III MINI PEN NEEDLES) 31G X  "5 MM misc 1 Device 4 (Four) Times a Day. 9/21/21   Davi Gandhi MD   Jardiance 25 MG tablet tablet TAKE 1 TABLET DAILY 9/12/22   Davi Gandhi MD   lisinopril (PRINIVIL,ZESTRIL) 10 MG tablet TAKE 1 TABLET BY MOUTH DAILY 8/2/22   Davi Gandhi MD   metFORMIN (GLUCOPHAGE) 500 MG tablet Take 2 tablets by mouth 2 (Two) Times a Day With Meals. 8/5/21   Davi Gandhi MD   pantoprazole (PROTONIX) 40 MG EC tablet Take 1 tablet by mouth Daily. 9/19/22   Rian Nobles MD   tadalafil (CIALIS) 20 MG tablet Take 20 mg by mouth Daily As Needed for erectile dysfunction.    Provider, MD Oleksandr   Insulin Glargine (BASAGLAR KWIKPEN) 100 UNIT/ML injection pen INJECT 50 UNITS            SUBCUTANEOUSLY INTO        APPROPRIATE AREA DAILY AS  DIRECTED 9/12/22 10/14/22  Davi Gandhi MD     I have utilized all available immediate resources to obtain, update, and review the patient's current medications.    Objective     Vital Signs: /85   Pulse 75   Temp 98.2 °F (36.8 °C)   Resp 14   Ht 180.3 cm (71\")   Wt 98 kg (216 lb)   SpO2 98%   BMI 30.13 kg/m²   Physical Exam  Vitals reviewed.   Constitutional:       General: He is not in acute distress.     Appearance: He is not toxic-appearing.   HENT:      Head: Normocephalic.      Mouth/Throat:      Mouth: Mucous membranes are moist.   Eyes:      Pupils: Pupils are equal, round, and reactive to light.   Cardiovascular:      Rate and Rhythm: Normal rate and regular rhythm.   Pulmonary:      Effort: Pulmonary effort is normal. No respiratory distress.      Breath sounds: No wheezing or rales.   Abdominal:      Palpations: Abdomen is soft.   Musculoskeletal:         General: No swelling.   Skin:     General: Skin is warm.      Capillary Refill: Capillary refill takes less than 2 seconds.   Neurological:      Mental Status: He is alert and oriented to person, place, and time.      Cranial Nerves: No cranial nerve deficit.      Comments: " Face symmetric; speech fluent.  ? atypical findings on motor assessment of left upper extremity - sputtering symptoms   Psychiatric:         Mood and Affect: Mood normal.          Results Reviewed:  Lab Results (last 24 hours)     Procedure Component Value Units Date/Time    Comprehensive Metabolic Panel [[id number]]  (Abnormal) Collected: 10/14/22 1719    Specimen: Blood Updated: 10/14/22 1754     Glucose 206 mg/dL      BUN 18 mg/dL      Creatinine 1.19 mg/dL      Sodium 136 mmol/L      Potassium 4.6 mmol/L      Comment: Slight hemolysis detected by analyzer. Results may be affected.        Chloride 100 mmol/L      CO2 22.0 mmol/L      Calcium 9.6 mg/dL      Total Protein 7.6 g/dL      Albumin 4.50 g/dL      ALT (SGPT) 22 U/L      AST (SGOT) 26 U/L      Comment: Slight hemolysis detected by analyzer. Results may be affected.        Alkaline Phosphatase 109 U/L      Total Bilirubin 0.3 mg/dL      Globulin 3.1 gm/dL      A/G Ratio 1.5 g/dL      BUN/Creatinine Ratio 15.1     Anion Gap 14.0 mmol/L      eGFR 69.9 mL/min/1.73      Comment: National Kidney Foundation and American Society of Nephrology (ASN) Task Force recommended calculation based on the Chronic Kidney Disease Epidemiology Collaboration (CKD-EPI) equation refit without adjustment for race.       Narrative:      GFR Normal >60  Chronic Kidney Disease <60  Kidney Failure <15      Troponin [[id number]]  (Normal) Collected: 10/14/22 1719    Specimen: Blood Updated: 10/14/22 1752     Troponin T <0.010 ng/mL     Narrative:      Troponin T Reference Range:  <= 0.03 ng/mL-   Negative for AMI  >0.03 ng/mL-     Abnormal for myocardial necrosis.  Clinicians would have to utilize clinical acumen, EKG, Troponin and serial changes to determine if it is an Acute Myocardial Infarction or myocardial injury due to an underlying chronic condition.       Results may be falsely decreased if patient taking Biotin.      Protime-INR [[id number]]  (Normal) Collected: 10/14/22 1719     Specimen: Blood Updated: 10/14/22 1745     Protime 12.4 Seconds      INR 0.96    aPTT [[id number]]  (Normal) Collected: 10/14/22 1719    Specimen: Blood Updated: 10/14/22 1745     PTT 31.0 seconds     POC Glucose Once [[id number]]  (Abnormal) Collected: 10/14/22 1724    Specimen: Blood Updated: 10/14/22 1737     Glucose 200 mg/dL      Comment: : [id number] Samir ElaineMeter ID: [id number]       CBC & Differential [[id number]]  (Normal) Collected: 10/14/22 1719    Specimen: Blood Updated: 10/14/22 1734    Narrative:      The following orders were created for panel order CBC & Differential.  Procedure                               Abnormality         Status                     ---------                               -----------         ------                     CBC Auto Differential[[id number]]        Normal              Final result                 Please view results for these tests on the individual orders.    CBC Auto Differential [[id number]]  (Normal) Collected: 10/14/22 1719    Specimen: Blood Updated: 10/14/22 1734     WBC 7.74 10*3/mm3      RBC 5.50 10*6/mm3      Hemoglobin 16.9 g/dL      Hematocrit 47.5 %      MCV 86.4 fL      MCH 30.7 pg      MCHC 35.6 g/dL      RDW 12.8 %      RDW-SD 40.1 fl      MPV 9.8 fL      Platelets 182 10*3/mm3      Neutrophil % 53.3 %      Lymphocyte % 30.9 %      Monocyte % 10.1 %      Eosinophil % 5.0 %      Basophil % 0.4 %      Immature Grans % 0.3 %      Neutrophils, Absolute 4.13 10*3/mm3      Lymphocytes, Absolute 2.39 10*3/mm3      Monocytes, Absolute 0.78 10*3/mm3      Eosinophils, Absolute 0.39 10*3/mm3      Basophils, Absolute 0.03 10*3/mm3      Immature Grans, Absolute 0.02 10*3/mm3      nRBC 0.0 /100 WBC     Lavender Top [[id number]] Collected: 10/14/22 1719    Specimen: Blood Updated: 10/14/22 1730    Light Blue Top [[id number]] Collected: 10/14/22 1719    Specimen: Blood Updated: 10/14/22 1730    Red Top [[id number]] Collected: 10/14/22 5253    Specimen: Blood  Updated: 10/14/22 1730    [place] Draw [[id number]] Collected: 10/14/22 1719    Specimen: Blood Updated: 10/14/22 1730    Narrative:      The following orders were created for panel order [place] Draw.  Procedure                               Abnormality         Status                     ---------                               -----------         ------                     Green Top (Gel)[[id number]]                                  In process                 Lavender Top[[id number]]                                     In process                 Red Top[[id number]]                                          In process                 Light Blue Top[[id number]]                                   In process                   Please view results for these tests on the individual orders.    Green Top (Gel) [[id number]] Collected: 10/14/22 1719    Specimen: Blood Updated: 10/14/22 1730        Imaging Results (Last 24 Hours)     Procedure Component Value Units Date/Time    CT Angiogram Head w AI Analysis of LVO [[id number]] Collected: 10/14/22 1746     Updated: 10/14/22 1752    Narrative:      CT ANGIOGRAM HEAD W AI ANALYSIS OF LVO- 10/14/2022 5:18 PM CDT     HISTORY: Stroke, follow up; left-sided weakness     COMPARISON: None     DOSE LENGTH PRODUCT: 196 mGy cm. Automated exposure control was also  utilized to decrease patient radiation dose.     TECHNIQUE: Axial images of brain are obtained following IV contrast. 2-D  and maximal intensity projection images are reviewed. AI analysis of LVO  was utilized.        FINDINGS:  Minimal calcification considered within the cavernous  segments of the distal internal carotid arteries creating less than 25%  stenosis. 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  Bilateral anterior middle cerebral arteries are patent and. Hypoplastic  right vertebral artery with the basilar artery supplied by the left  vertebral artery. Mild calcified plaque of the distal left  vertebral  artery creating less than 50% stenosis. There is artery slightly small  in caliber with no focal high-grade stenosis. Bilateral posterior  cerebral arteries are patent. No large vessel occlusion. No aneurysm or  dissection. No abnormal intracranial enhancement.       Impression:      1. No large vessel occlusion. No aneurysm or dissection.  This report was finalized on 10/14/2022 17:49 by Dr. Letitia Cornejo MD.    XR Chest 1 View [[id number]] Resulted: 10/14/22 1744     Updated: 10/14/22 1751    CT Angiogram Neck [[id number]] Collected: 10/14/22 1742     Updated: 10/14/22 1748    Narrative:      CT ANGIOGRAM NECK- 10/14/2022 5:18 PM CDT     HISTORY: Stroke, follow up; left-sided weakness     COMPARISON: None     DOSE LENGTH PRODUCT: 196 mGy cm. Automated exposure control was also  utilized to decrease patient radiation dose.     TECHNIQUE: Axial images the neck are obtained following IV contrast. 2-D  and maximal intensity projection images are reviewed.     FINDINGS:  Mild calcification of the arch of the thoracic aorta. Bovine  variant to the arch with a common origin the right brachiocephalic and  left common carotid arteries. Right brachiocephalic artery is patent. No  subclavian artery stenosis identified. The bilateral common, internal,  and external carotid arteries are widely patent. Vertebral arteries  originate from the proximal subclavian arteries as expected. Prominent  hypoplasia right vertebral artery with dominant left vertebral artery.  Mild calcified plaque of the distal left vertebral artery creating less  than 30% stenosis. Basilar artery is supplied by the left vertebral  artery. No focal high-grade stenosis. No aneurysm or dissection.     Mosaic appearance to the visible upper lobe parenchyma may be seen with  air trapping. No apical pneumothorax. No pathologic lymphadenopathy or  soft tissue mass identified within the visible plaque. Slight  exaggerated lordosis of the cervical  spine.       Impression:      1. Widely patent bilateral extracranial carotid arteries. 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 Basilar artery supplied by the  left vertebral artery. No focal high-grade stenosis. No aneurysm or  dissection.  This report was finalized on 10/14/2022 17:45 by Dr. Letitia Cornejo MD.    CT Head Without Contrast Stroke Protocol [[id number]] Collected: 10/14/22 1723     Updated: 10/14/22 1729    Narrative:      CT HEAD WO CONTRAST STROKE PROTOCOL- 10/14/2022 5:17 PM CDT     HISTORY: Neuro deficit, acute, stroke suspected, left-sided weakness  with numbness     COMPARISON: None     DOSE LENGTH PRODUCT: 696 mGy cm. Automated exposure control was also  utilized to decrease patient radiation dose.     TECHNIQUE: Axial images of the brain are obtained without IV contrast.     FINDINGS:  The ventricles are normal in size and configuration. There is  no intracranial hemorrhage or mass effect. There are no acute signs of  ischemia based on CT imaging. No extra-axial hematoma or subarachnoid  hemorrhage.     No air-fluid levels within the visible paranasal sinuses. Mastoid air  cells well-aerated. The bony calvarium appears intact.       Impression:      1. No acute intracranial abnormality identified. No intracranial  hemorrhage. No acute signs of ischemia based on CT exam.     Comments: Findings called to Magda in the ER at 5:26 PM on 10/14/2022  This report was finalized on 10/14/2022 17:26 by Dr. Letitia Cornejo MD.        I have personally reviewed and interpreted the radiology studies and ECG obtained at time of admission.     Assessment / Plan     Assessment:   Active Hospital Problems    Diagnosis    • **Cerebrovascular accident (CVA), unspecified mechanism (HCC)    • Type 2 diabetes mellitus with hyperglycemia (HCC)    • Gastroesophageal reflux disease      Added automatically from request for surgery [id number]     • Hyperlipidemia-[facility]    • Hypertension      Plan:   1.   MRI Brain  2.  Echo with bubble  3.  Telemetry monitoring  4.  Neuro has already evaluated patient; appreciate their assistance  5.  ASA/Plavix; trial of statin  6.  Check lipids and A1c  7.  Levemir 30 units QHS; insulin lispro 7 units with meals  8.  Allow for permissive HTN in the short term  9.  PT/OT/ST  10. SCDs  11.  Workup ongoing      Electronically signed by Shaun Cortez MD, 10/14/22, 18:17 CDT.

## 2022-10-14 NOTE — TELEPHONE ENCOUNTER
Fax refill request basaglar 50 u daily, 90 day supply  Rx Refill Note  Requested Prescriptions     Pending Prescriptions Disp Refills   • Insulin Glargine (BASAGLAR KWIKPEN) 100 UNIT/ML injection pen 45 mL 3     Sig: Inject 50 Units under the skin into the appropriate area as directed Daily for 90 days.      Last office visit with prescribing clinician: Visit date not found      Next office visit with prescribing clinician: Visit date not found            Chelsy Murillo LPN  10/14/22, 10:56 CDT

## 2022-10-14 NOTE — CONSULTS
Neurology Consultation Note    Referring Provider:   Dr. Dez Morin MD    Reason for Consultation:    Code stroke    Subjective     History of Present Illness:  This is a very pleasant 62-year-old right-hand-dominant male routinely cared for by Davi Gandhi MD, who has a history of hypertension and diabetes mellitus.  Patient does not know how well-controlled his diabetes is as his last hemoglobin A1c was more than a year ago due to avoidance of outpatient appointments from COVID-19.    Today, the patient was with his family at a pumpkin patch when he noticed he had difficulty with sensation of the left arm and left leg with some weakness of the left leg.  The patient does have known diabetic peripheral neuropathy that affects both lower extremities and he also has a history of a poorly healing wound and the left foot that required hyperbaric oxygen chamber treatment which was resolved poorly controlled diabetes mellitus.    The patient presented approximately 2 hours after the onset of symptoms.  Initial CT of the head was negative for acute intracranial process and CT of the head and neck was negative for large vessel occlusion, but did demonstrate mild, diffuse atheromatous disease and a very diminutive right vertebral artery and a left dominant vertebral system.    On arrival, NIHSS is 2 for sensory deficit in the left arm and leg and very subtle weakness in the distal left upper extremity.    Because of these concerns, the patient was not felt to be a candidate for tPA administration.  We did discuss the risks, benefits & alternatives to the administration of the medication with the patient and he elected not to proceed with this.      Past Medical History:   Diagnosis Date   • Diabetes mellitus (HCC)    • Hypertension    • Premature ventricular beats        No Known Allergies  No current facility-administered medications on file prior to encounter.     Current Outpatient Medications on File Prior  to Encounter   Medication Sig   • Insulin Glargine (BASAGLAR KWIKPEN) 100 UNIT/ML injection pen Inject 50 Units under the skin into the appropriate area as directed Daily for 90 days.   • aspirin 81 MG EC tablet Take 81 mg by mouth Daily.   • colesevelam (WELCHOL) 625 MG tablet TAKE 3 TABLETS BY MOUTH TWICE DAILY WITH MEALS   • Dulaglutide 3 MG/0.5ML solution pen-injector Inject 3 mg under the skin into the appropriate area as directed 1 (One) Time Per Week.   • insulin aspart (NovoLOG FlexPen) 100 UNIT/ML solution pen-injector sc pen Inject 7 Units under the skin into the appropriate area as directed 3 (Three) Times a Day With Meals.   • Insulin Pen Needle (B-D UF III MINI PEN NEEDLES) 31G X 5 MM misc 1 Device 4 (Four) Times a Day.   • Jardiance 25 MG tablet tablet TAKE 1 TABLET DAILY   • lisinopril (PRINIVIL,ZESTRIL) 10 MG tablet TAKE 1 TABLET BY MOUTH DAILY   • metFORMIN (GLUCOPHAGE) 500 MG tablet Take 2 tablets by mouth 2 (Two) Times a Day With Meals.   • pantoprazole (PROTONIX) 40 MG EC tablet Take 1 tablet by mouth Daily.   • tadalafil (CIALIS) 20 MG tablet Take 20 mg by mouth Daily As Needed for erectile dysfunction.   • [DISCONTINUED] Insulin Glargine (BASAGLAR KWIKPEN) 100 UNIT/ML injection pen INJECT 50 UNITS            SUBCUTANEOUSLY INTO        APPROPRIATE AREA DAILY AS  DIRECTED       Social History     Socioeconomic History   • Marital status:      Spouse name: [place]   • Number of children: 3   • Years of education: 14   Tobacco Use   • Smoking status: Never   • Smokeless tobacco: Never   Vaping Use   • Vaping Use: Never used   Substance and Sexual Activity   • Alcohol use: No   • Drug use: No   • Sexual activity: Defer     Family History   Problem Relation Age of Onset   • Diabetes Mother    • Stroke Mother    • Heart disease Father    • Colon cancer Brother        Review of Systems  A 14 point review of systems was reviewed and was negative.    Objective      Vital Signs  Temp:  [98.2 °F (36.8  °C)] 98.2 °F (36.8 °C)  Heart Rate:  [75] 75  Resp:  [14] 14  BP: (169)/(85) 169/85    General Exam:  Head:  Normocephalic, atraumatic  HEENT:  Neck supple  CVS:  Regular rate and rhythm.    Carotid Examination:    Lungs:  Clear t  Abdomen:  Non-tender, Non-distended  Extremities:  No signs of peripheral edema  Skin:  No rashes      Neurologic Exam:  Mental Status:    -Awake. Alert. Oriented to person, place & time.  -No word finding difficulties.  -No aphasia.  -No dysarthria.  -Follows simple commands.     CN II:  Full visual fields with confrontation.  Pupils equally reactive to light.  CN III, IV, VI:  Extraocular muscles function intact with no nystagmus.  CN V:  Facial sensory is symmetric.  CN VII:  Facial motor symmetric.  CN VIII:  Gross hearing intact bilaterally.  CN IX/X:  Palate elevates symmetrically.  CN XI:  Shoulder shrug symmetric.  CN XII:  Tongue is midline on protrusion.     Motor: (strength out of 5:  1= minimal movement, 2 = movement in plane of gravity, 3 = movement against gravity, 4 = movement against some resistance, 5 = full strength)     -Right Upper Ext: Proximal: 5 Distal: 5  -Left Upper Ext: Proximal: 5 Distal: 5    -Right Lower Ext: Proximal: 5 Distal: 5  -Left Lower Ext: Proximal: 5 Distal: 5       Deep Tendon Reflexes:  -Right              Biceps: 2+         Triceps: 2+      Brachioradialis: 2+              Patella: 2+       Ankle: 2+         Babinski:  negative  -Left              Biceps: 2+         Triceps: 2+      Brachioradialis: 2+              Patella: 2+       Ankle: 2+         Babinski:  negative    Tone (Modified Mervat Scale):  No appreciable increase in tone or rigidity noted.     Sensory:  -Intact to light touch, pinprick BUE (C5-T1) and BLE (L2-S1).     Coordination:  -Finger to nose intact BUEs  -Heel to shin intact BLEs  -No ataxia     Gait  -No signs of ataxia  -ambulates unassisted    Results Review:  Lab Results (last 24 hours)     Procedure Component Value  Units Date/Time    POC Glucose Once [[id number]]  (Abnormal) Collected: 10/14/22 1724    Specimen: Blood Updated: 10/14/22 1737     Glucose 200 mg/dL      Comment: : [id number] Samir Thompson ID: [id number]       CBC & Differential [[id number]]  (Normal) Collected: 10/14/22 1719    Specimen: Blood Updated: 10/14/22 1734    Narrative:      The following orders were created for panel order CBC & Differential.  Procedure                               Abnormality         Status                     ---------                               -----------         ------                     CBC Auto Differential[[id number]]        Normal              Final result                 Please view results for these tests on the individual orders.    CBC Auto Differential [[id number]]  (Normal) Collected: 10/14/22 1719    Specimen: Blood Updated: 10/14/22 1734     WBC 7.74 10*3/mm3      RBC 5.50 10*6/mm3      Hemoglobin 16.9 g/dL      Hematocrit 47.5 %      MCV 86.4 fL      MCH 30.7 pg      MCHC 35.6 g/dL      RDW 12.8 %      RDW-SD 40.1 fl      MPV 9.8 fL      Platelets 182 10*3/mm3      Neutrophil % 53.3 %      Lymphocyte % 30.9 %      Monocyte % 10.1 %      Eosinophil % 5.0 %      Basophil % 0.4 %      Immature Grans % 0.3 %      Neutrophils, Absolute 4.13 10*3/mm3      Lymphocytes, Absolute 2.39 10*3/mm3      Monocytes, Absolute 0.78 10*3/mm3      Eosinophils, Absolute 0.39 10*3/mm3      Basophils, Absolute 0.03 10*3/mm3      Immature Grans, Absolute 0.02 10*3/mm3      nRBC 0.0 /100 WBC     Lavender Top [[id number]] Collected: 10/14/22 1719    Specimen: Blood Updated: 10/14/22 1730    Protime-INR [[id number]] Collected: 10/14/22 1719    Specimen: Blood Updated: 10/14/22 1730    aPTT [[id number]] Collected: 10/14/22 1719    Specimen: Blood Updated: 10/14/22 1730    Light Blue Top [[id number]] Collected: 10/14/22 1719    Specimen: Blood Updated: 10/14/22 1730    Red Top [[id number]] Collected: 10/14/22 1719    Specimen: Blood  Updated: 10/14/22 1730    [place] Draw [[id number]] Collected: 10/14/22 1719    Specimen: Blood Updated: 10/14/22 1730    Narrative:      The following orders were created for panel order [place] Draw.  Procedure                               Abnormality         Status                     ---------                               -----------         ------                     Green Top (Gel)[[id number]]                                  In process                 Lavender Top[[id number]]                                     In process                 Red Top[[id number]]                                          In process                 Light Blue Top[[id number]]                                   In process                   Please view results for these tests on the individual orders.    Comprehensive Metabolic Panel [[id number]] Collected: 10/14/22 1719    Specimen: Blood Updated: 10/14/22 1730    Troponin [[id number]] Collected: 10/14/22 1719    Specimen: Blood Updated: 10/14/22 1730    Green Top (Gel) [[id number]] Collected: 10/14/22 1719    Specimen: Blood Updated: 10/14/22 1730          .  Imaging Results (Last 24 Hours)     Procedure Component Value Units Date/Time    CT CEREBRAL PERFUSION WITH & WITHOUT CONTRAST [[id number]] Resulted: 10/14/22 1729     Updated: 10/14/22 1735    CT Head Without Contrast Stroke Protocol [[id number]] Collected: 10/14/22 1723     Updated: 10/14/22 1729    Narrative:      CT HEAD WO CONTRAST STROKE PROTOCOL- 10/14/2022 5:17 PM CDT     HISTORY: Neuro deficit, acute, stroke suspected, left-sided weakness  with numbness     COMPARISON: None     DOSE LENGTH PRODUCT: 696 mGy cm. Automated exposure control was also  utilized to decrease patient radiation dose.     TECHNIQUE: Axial images of the brain are obtained without IV contrast.     FINDINGS:  The ventricles are normal in size and configuration. There is  no intracranial hemorrhage or mass effect. There are no acute signs of  ischemia based on  CT imaging. No extra-axial hematoma or subarachnoid  hemorrhage.     No air-fluid levels within the visible paranasal sinuses. Mastoid air  cells well-aerated. The bony calvarium appears intact.       Impression:      1. No acute intracranial abnormality identified. No intracranial  hemorrhage. No acute signs of ischemia based on CT exam.     Comments: Findings called to Magda in the ER at 5:26 PM on 10/14/2022  This report was finalized on 10/14/2022 17:26 by Dr. Letitia Cornejo MD.    CT Angiogram Head w AI Analysis of LVO [[id number]] Resulted: 10/14/22 1720     Updated: 10/14/22 1729    CT Angiogram Neck [[id number]] Resulted: 10/14/22 1720     Updated: 10/14/22 1729            Assessment/Plan     Hospital Problem List      * No active hospital problems. *      Impression    • Acute onset left-sided numbness and mild weakness  • Probable right thalamic CVA  • Diabetes mellitus  • Hypertension, essential    Plan    · CT and CTA of the head & neck is reviewed with the patient and his wife at bedside.  We discussed the potential of administration of tPA and the patient has declined administration of tPA.  Although this could be considered, the patient's NIHSS is only 2.  As his deficits at this time are predominantly sensory, he elected not to proceed and I am in agreement with this.  · MRI brain without  · 2D echocardiogram with bubble study  · Cardiac telemetry  · SCDs  · Fasting lipid panel  · Hemoglobin A1c  · Tighter blood pressure less than 140/90, but may allow for permissive hypertension, initially.  · PT, OT & ST evaluations, however, patient likely has no therapy needs.  · Continue aspirin 81 mg daily  · Atorvastatin 80 mg daily.  Patient is presently on WelChol 625 mg 3 tablets twice daily.    Thank you, Dr. Morin, for the consultation and the opportunity to participate in care of your patient.        Harsh Simmons PA-C  10/14/22  17:38 CDT

## 2022-10-15 ENCOUNTER — APPOINTMENT (OUTPATIENT)
Dept: MRI IMAGING | Facility: HOSPITAL | Age: 62
End: 2022-10-15

## 2022-10-15 ENCOUNTER — READMISSION MANAGEMENT (OUTPATIENT)
Dept: CALL CENTER | Facility: HOSPITAL | Age: 62
End: 2022-10-15

## 2022-10-15 ENCOUNTER — APPOINTMENT (OUTPATIENT)
Dept: CARDIOLOGY | Facility: HOSPITAL | Age: 62
End: 2022-10-15

## 2022-10-15 VITALS
HEART RATE: 90 BPM | TEMPERATURE: 97.4 F | OXYGEN SATURATION: 97 % | RESPIRATION RATE: 18 BRPM | WEIGHT: 216 LBS | SYSTOLIC BLOOD PRESSURE: 117 MMHG | HEIGHT: 71 IN | DIASTOLIC BLOOD PRESSURE: 71 MMHG | BODY MASS INDEX: 30.24 KG/M2

## 2022-10-15 PROBLEM — R20.0 LEFT SIDED NUMBNESS: Status: ACTIVE | Noted: 2022-10-15

## 2022-10-15 LAB
BH CV ECHO MEAS - AO MAX PG: 7.7 MMHG
BH CV ECHO MEAS - AO MEAN PG: 4 MMHG
BH CV ECHO MEAS - AO ROOT DIAM: 4 CM
BH CV ECHO MEAS - AO V2 MAX: 139 CM/SEC
BH CV ECHO MEAS - AO V2 VTI: 25.2 CM
BH CV ECHO MEAS - AVA(I,D): 1.51 CM2
BH CV ECHO MEAS - EDV(CUBED): 161.9 ML
BH CV ECHO MEAS - EDV(MOD-SP4): 122 ML
BH CV ECHO MEAS - EF(MOD-SP4): 46.8 %
BH CV ECHO MEAS - ESV(CUBED): 64 ML
BH CV ECHO MEAS - ESV(MOD-SP4): 64.9 ML
BH CV ECHO MEAS - FS: 26.6 %
BH CV ECHO MEAS - IVS/LVPW: 1 CM
BH CV ECHO MEAS - IVSD: 1.04 CM
BH CV ECHO MEAS - LA DIMENSION: 4.1 CM
BH CV ECHO MEAS - LAT PEAK E' VEL: 10.4 CM/SEC
BH CV ECHO MEAS - LV DIASTOLIC VOL/BSA (35-75): 56 CM2
BH CV ECHO MEAS - LV MASS(C)D: 221.1 GRAMS
BH CV ECHO MEAS - LV MAX PG: 1.5 MMHG
BH CV ECHO MEAS - LV MEAN PG: 1 MMHG
BH CV ECHO MEAS - LV SYSTOLIC VOL/BSA (12-30): 29.8 CM2
BH CV ECHO MEAS - LV V1 MAX: 61.3 CM/SEC
BH CV ECHO MEAS - LV V1 VTI: 12.1 CM
BH CV ECHO MEAS - LVIDD: 5.5 CM
BH CV ECHO MEAS - LVIDS: 4 CM
BH CV ECHO MEAS - LVOT AREA: 3.1 CM2
BH CV ECHO MEAS - LVOT DIAM: 2 CM
BH CV ECHO MEAS - LVPWD: 1.04 CM
BH CV ECHO MEAS - MED PEAK E' VEL: 5.9 CM/SEC
BH CV ECHO MEAS - MV A MAX VEL: 96 CM/SEC
BH CV ECHO MEAS - MV DEC SLOPE: 920 CM/SEC2
BH CV ECHO MEAS - MV DEC TIME: 0.15 MSEC
BH CV ECHO MEAS - MV E MAX VEL: 81.4 CM/SEC
BH CV ECHO MEAS - MV E/A: 0.85
BH CV ECHO MEAS - MV P1/2T: 33.1 MSEC
BH CV ECHO MEAS - MVA(P1/2T): 6.6 CM2
BH CV ECHO MEAS - PA V2 MAX: 115 CM/SEC
BH CV ECHO MEAS - RAP SYSTOLE: 5 MMHG
BH CV ECHO MEAS - SI(MOD-SP4): 26.2 ML/M2
BH CV ECHO MEAS - SV(LVOT): 38 ML
BH CV ECHO MEAS - SV(MOD-SP4): 57.1 ML
BH CV ECHO MEASUREMENTS AVERAGE E/E' RATIO: 9.99
BH CV XLRA - TDI S': 12.6 CM/SEC
CHOLEST SERPL-MCNC: 126 MG/DL (ref 0–200)
GLUCOSE BLDC GLUCOMTR-MCNC: 239 MG/DL (ref 70–130)
GLUCOSE BLDC GLUCOMTR-MCNC: 94 MG/DL (ref 70–130)
HBA1C MFR BLD: 8.7 % (ref 4.8–5.6)
HDLC SERPL-MCNC: 33 MG/DL (ref 40–60)
LDLC SERPL CALC-MCNC: 76 MG/DL (ref 0–100)
LDLC/HDLC SERPL: 2.3 {RATIO}
LEFT ATRIUM VOLUME INDEX: 32.8 ML/M2
LEFT ATRIUM VOLUME: 58 ML
MAXIMAL PREDICTED HEART RATE: 160 BPM
STRESS TARGET HR: 136 BPM
TRIGL SERPL-MCNC: 85 MG/DL (ref 0–150)
VLDLC SERPL-MCNC: 17 MG/DL (ref 5–40)

## 2022-10-15 PROCEDURE — 80061 LIPID PANEL: CPT | Performed by: INTERNAL MEDICINE

## 2022-10-15 PROCEDURE — 82962 GLUCOSE BLOOD TEST: CPT

## 2022-10-15 PROCEDURE — 97165 OT EVAL LOW COMPLEX 30 MIN: CPT

## 2022-10-15 PROCEDURE — 83036 HEMOGLOBIN GLYCOSYLATED A1C: CPT | Performed by: INTERNAL MEDICINE

## 2022-10-15 PROCEDURE — 25010000002 PERFLUTREN 6.52 MG/ML SUSPENSION: Performed by: INTERNAL MEDICINE

## 2022-10-15 PROCEDURE — 93306 TTE W/DOPPLER COMPLETE: CPT

## 2022-10-15 PROCEDURE — 99233 SBSQ HOSP IP/OBS HIGH 50: CPT | Performed by: PSYCHIATRY & NEUROLOGY

## 2022-10-15 PROCEDURE — 70551 MRI BRAIN STEM W/O DYE: CPT

## 2022-10-15 PROCEDURE — 93306 TTE W/DOPPLER COMPLETE: CPT | Performed by: INTERNAL MEDICINE

## 2022-10-15 PROCEDURE — 63710000001 INSULIN LISPRO (HUMAN) PER 5 UNITS: Performed by: INTERNAL MEDICINE

## 2022-10-15 PROCEDURE — 36415 COLL VENOUS BLD VENIPUNCTURE: CPT | Performed by: INTERNAL MEDICINE

## 2022-10-15 RX ADMIN — PERFLUTREN 8.48 MG: 6.52 INJECTION, SUSPENSION INTRAVENOUS at 10:52

## 2022-10-15 RX ADMIN — ASPIRIN 81 MG: 81 TABLET, CHEWABLE ORAL at 11:24

## 2022-10-15 RX ADMIN — CLOPIDOGREL 75 MG: 75 TABLET, FILM COATED ORAL at 11:24

## 2022-10-15 RX ADMIN — PANTOPRAZOLE SODIUM 40 MG: 40 TABLET, DELAYED RELEASE ORAL at 11:26

## 2022-10-15 RX ADMIN — EMPAGLIFLOZIN 25 MG: 25 TABLET, FILM COATED ORAL at 11:24

## 2022-10-15 RX ADMIN — Medication 10 ML: at 11:24

## 2022-10-15 RX ADMIN — INSULIN LISPRO 4 UNITS: 100 INJECTION, SOLUTION INTRAVENOUS; SUBCUTANEOUS at 13:06

## 2022-10-15 RX ADMIN — INSULIN LISPRO 7 UNITS: 100 INJECTION, SOLUTION INTRAVENOUS; SUBCUTANEOUS at 13:09

## 2022-10-15 NOTE — PROGRESS NOTES
Adult Nutrition  Assessment    Patient Name:  Abdullahi MACE  YOB: 1960  MRN: [id number]  Admit Date:  10/14/2022    Assessment Date:  10/15/2022   Reason for Assessment     Row Name 10/15/22 0932          Reason for Assessment    Reason For Assessment physician consult     Diagnosis neurologic conditions     Identified At Risk by Screening Criteria need for education  Inpatient Diabetes Educator per Stroke Protocol                Nutrition/Diet History     Row Name 10/15/22 0932          Nutrition/Diet History    Typical Intake (Food/Fluid/EN/PN) Pt and pt family in room. Pt aware of A1c 8.7%. Discussed multifactorial causes of elevation; likewise, multifactorial approrach to decreasing A1c and DM management. Pt aware of DM MNT; endorses application needs improvement. Encouraged pt to implement changes as ready and informed pt of OP NTN counseling at [facility].                Labs/Tests/Procedures/Meds     Row Name 10/15/22 0934          Labs/Procedures/Meds    Lab Results Comments A1c 8.7               Electronically signed by:  Evelia Najera RD  10/15/22 09:35 CDT

## 2022-10-15 NOTE — PLAN OF CARE
Goal Outcome Evaluation:  Plan of Care Reviewed With: patient        Progress: no change  Outcome Evaluation: OT evaluation completed.  Pt is A&Ox4.  He demo no further need for skilled OT services at this time.  Pt completes bed mobility, functional transfers, and mobility in the hallway independently.  He completes LB dressing and sink side grooming independently as well.  Pt does demonstrate some mild LUE weakness and diminished sensation.  His strength is very functional, however OT provided him with handout and verbal directions for LUE strengthening HEP.  Pt is accurate in report of stimuli, temperature, and localization to sensation to LUE, however he reports it feeling diminished compared to his RUE.  OT provided edu to pt regarding sensory desensitization techniques he can perform at home to maximize sensory function.  Pt verbalized understanding.  OT will sign off at this time and recommend pt to discharge home with assist.

## 2022-10-15 NOTE — OUTREACH NOTE
Prep Survey    Flowsheet Row Responses   [facility] patient discharged from? [facility]   Is LACE score < 7 ? Yes   Emergency Room discharge w/ pulse ox? No   Eligibility [facility]   Date of Admission 10/14/22   Date of Discharge 10/15/22   Discharge Disposition Home or Self Care   Discharge diagnosis L sided numbness and weakness ( Neuro cleared patient no evidence of CVA)   Does the patient have one of the following disease processes/diagnoses(primary or secondary)? Other   Does the patient have Home health ordered? No   Is there a DME ordered? No   Prep survey completed? Yes          LALO MILES - Registered Nurse

## 2022-10-15 NOTE — PLAN OF CARE
Goal Outcome Evaluation:               Resting well throughout the night.  NIHSS has been 1 ever since admitted to the floor - slight numbness / decreased sensation to LLE and LUE.  Voiding.  Ambulating to bathroom with standby assistance.  SCDs for VTE.  No new complaints voiced or neurological changes noted throughout the shift.

## 2022-10-15 NOTE — PLAN OF CARE
Goal Outcome Evaluation:  Plan of Care Reviewed With: patient        Progress: no change (Screening only)       Communication evaluation per stroke protocol. Patient alert and oriented. No impairment present which warrants skilled intervention. Screening only. Please reconsult if any acute changes or concerns.

## 2022-10-15 NOTE — PROGRESS NOTES
Neurology Progress Note      Chief Complaint:    Left-sided weakness    Subjective     Subjective:  Patient is doing about the same this morning without any new focal neurologic complaints.  He continues to report sensory > motor deficits of the left upper and lower extremity.  This is superimposed over diabetic peripheral neuropathy affecting both lower extremities.  Patient has not been out of bed ambulating this morning.  MRI brain is pending.  Echocardiogram pending.    Hemoglobin A1c elevated at 8.70%.  LDL 76.      Past Medical History:   Diagnosis Date   • Cerebrovascular accident (CVA), unspecified mechanism (HCC) 10/14/2022   • Diabetes mellitus (HCC)    • Hypertension    • Premature ventricular beats      Past Surgical History:   Procedure Laterality Date   • APPENDECTOMY     • COLONOSCOPY  10/22/2013   • COLONOSCOPY N/A 9/19/2022    Procedure: COLONOSCOPY WITH ANESTHESIA;  Surgeon: Rian Nobles MD;  Location: [facility] ENDOSCOPY;  Service: Gastroenterology;  Laterality: N/A;  preop; hx of polyps  postop; hemmhroids   PCP Davi Gandhi    • ENDOSCOPY N/A 9/19/2022    Procedure: ESOPHAGOGASTRODUODENOSCOPY WITH ANESTHESIA;  Surgeon: Rian Nobles MD;  Location: [facility] ENDOSCOPY;  Service: Gastroenterology;  Laterality: N/A;  preop; dysphagia  postop; r/o barretts; esophagitis   PCP Davi Gandhi      Family History   Problem Relation Age of Onset   • Diabetes Mother    • Stroke Mother    • Heart disease Father    • Colon cancer Brother      Social History     Tobacco Use   • Smoking status: Never   • Smokeless tobacco: Never   Vaping Use   • Vaping Use: Never used   Substance Use Topics   • Alcohol use: No   • Drug use: No       Medications:  Current Facility-Administered Medications   Medication Dose Route Frequency Provider Last Rate Last Admin   • acetaminophen (TYLENOL) tablet 650 mg  650 mg Oral Q4H PRN Shaun Cortez MD        Or   • acetaminophen (TYLENOL) 160 MG/5ML solution 650 mg   650 mg Oral Q4H PRN Shaun Cortez MD        Or   • acetaminophen (TYLENOL) suppository 650 mg  650 mg Rectal Q4H PRN Shaun Cortez MD       • aspirin chewable tablet 81 mg  81 mg Oral Daily Shaun Cortez MD        Or   • aspirin suppository 300 mg  300 mg Rectal Daily Shaun Cortez MD       • atorvastatin (LIPITOR) tablet 80 mg  80 mg Oral Nightly Shaun Cortez MD   80 mg at 10/14/22 2239   • clopidogrel (PLAVIX) tablet 75 mg  75 mg Oral Daily Shaun Cortez MD   75 mg at 10/14/22 1850   • dextrose (D50W) (25 g/50 mL) IV injection 25 g  25 g Intravenous Q15 Min PRN Shaun Cortez MD       • dextrose (GLUTOSE) oral gel 15 g  15 g Oral Q15 Min PRN Shaun Cortez MD       • empagliflozin (JARDIANCE) tablet 25 mg  25 mg Oral Daily Shaun Cortez MD       • glucagon (human recombinant) (GLUCAGEN DIAGNOSTIC) injection 1 mg  1 mg Intramuscular Q15 Min PRN Shaun Cortez MD       • insulin detemir (LEVEMIR) injection 30 Units  30 Units Subcutaneous Nightly Shaun Cortez MD   30 Units at 10/14/22 2241   • Insulin Lispro (humaLOG) injection 0-9 Units  0-9 Units Subcutaneous TID AC Shaun Cortez MD       • Insulin Lispro (humaLOG) injection 7 Units  7 Units Subcutaneous TID With Meals Shaun Cortez MD       • ondansetron (ZOFRAN) injection 4 mg  4 mg Intravenous Q6H PRN Shaun Cortez MD       • pantoprazole (PROTONIX) EC tablet 40 mg  40 mg Oral Daily Shaun Cortez MD       • sodium chloride 0.9 % flush 10 mL  10 mL Intravenous PRN Shaun Cortez MD       • sodium chloride 0.9 % flush 10 mL  10 mL Intravenous Q12H Shaun Cortez MD   10 mL at 10/14/22 2239   • sodium chloride 0.9 % flush 10 mL  10 mL Intravenous PRN Shaun Cortez MD           Allergies:    Patient has no known allergies.    Review of Systems:   -A 14-point review of systems is completed and is negative.      Objective       Vital Signs  Temp:  [97.6 °F (36.4 °C)-98.2 °F (36.8 °C)] 97.6 °F (36.4 °C)  Heart Rate:  [74-82] 80  Resp:  [14-18] 18  BP: ()/(64-85) 97/64    Physical Exam:    General Exam:  Head:  Normocephalic, atraumatic  HEENT:  Neck supple  CVS:  Regular rate and rhythm.    Carotid Examination:    Lungs:  Clear t  Abdomen:  Non-tender, Non-distended  Extremities:  No signs of peripheral edema  Skin:  No rashes      Neurologic Exam:  Mental Status:    -Awake. Alert. Oriented to person, place & time.  -No word finding difficulties.  -No aphasia.  -No dysarthria.  -Follows simple commands.     CN II:  Full visual fields with confrontation.  Pupils equally reactive to light.  CN III, IV, VI:  Extraocular muscles function intact with no nystagmus.  CN V:  Facial sensory is symmetric.  CN VII:  Facial motor symmetric.  CN VIII:  Gross hearing intact bilaterally.  CN IX/X:  Palate elevates symmetrically.  CN XI:  Shoulder shrug symmetric.  CN XII:  Tongue is midline on protrusion.     Motor: (strength out of 5:  1= minimal movement, 2 = movement in plane of gravity, 3 = movement against gravity, 4 = movement against some resistance, 5 = full strength)     -Right Upper Ext: Proximal: 5 Distal: 5  -Left Upper Ext: Proximal: 5- Distal: 5    -Right Lower Ext: Proximal: 5 Distal: 5  -Left Lower Ext: Proximal: 4+ Distal: 5       Deep Tendon Reflexes:  -Right              Biceps: 2+         Triceps: 2+      Brachioradialis: 2+              Patella: 2+       Ankle: 2+           -Left              Biceps: 2+         Triceps: 2+      Brachioradialis: 2+              Patella: 2+       Ankle: 2+             Tone (Modified Mervat Scale):  No appreciable increase in tone or rigidity noted.     Sensory:  -Subjectively, decree sensation throughout the entirety of the left upper and lower extremity.  -Bilateral lower extremities with decreased sensation in a stocking distribution from the knees distally from diabetic peripheral  neuropathy.     Coordination:  -Finger to nose intact BUEs  -Heel to shin intact BLEs  -No ataxia     Gait  -No signs of ataxia  -ambulates unassisted       Results Review:    I reviewed the patient's new clinical results and findings.    Lab Results (last 24 hours)     Procedure Component Value Units Date/Time    POC Glucose Once [[id number]]  (Normal) Collected: 10/15/22 0800    Specimen: Blood Updated: 10/15/22 0812     Glucose 94 mg/dL      Comment: : [id number] Jamie Ortizeter ID: [id number]       Hemoglobin A1c [[id number]]  (Abnormal) Collected: 10/15/22 0413    Specimen: Blood Updated: 10/15/22 0447     Hemoglobin A1C 8.70 %     Narrative:      Hemoglobin A1C Ranges:    Increased Risk for Diabetes  5.7% to 6.4%  Diabetes                     >= 6.5%  Diabetic Goal                < 7.0%    Lipid Panel [[id number]]  (Abnormal) Collected: 10/15/22 0413    Specimen: Blood Updated: 10/15/22 0447     Total Cholesterol 126 mg/dL      Triglycerides 85 mg/dL      HDL Cholesterol 33 mg/dL      LDL Cholesterol  76 mg/dL      VLDL Cholesterol 17 mg/dL      LDL/HDL Ratio 2.30    Narrative:      Cholesterol Reference Ranges  (U.S. Department of Health and Human Services ATP III Classifications)    Desirable          <200 mg/dL  Borderline High    200-239 mg/dL  High Risk          >240 mg/dL      Triglyceride Reference Ranges  (U.S. Department of Health and Human Services ATP III Classifications)    Normal           <150 mg/dL  Borderline High  150-199 mg/dL  High             200-499 mg/dL  Very High        >500 mg/dL    HDL Reference Ranges  (U.S. Department of Health and Human Services ATP III Classifications)    Low     <40 mg/dl (major risk factor for CHD)  High    >60 mg/dl ('negative' risk factor for CHD)        LDL Reference Ranges  (U.S. Department of Health and Human Services ATP III Classifications)    Optimal          <100 mg/dL  Near Optimal     100-129 mg/dL  Borderline High  130-159 mg/dL  High              160-189 mg/dL  Very High        >189 mg/dL    POC Glucose Once [[id number]]  (Abnormal) Collected: 10/14/22 2238    Specimen: Blood Updated: 10/14/22 2249     Glucose 154 mg/dL      Comment: : rowena RochaMeter ID: [id number]       [place] Draw [[id number]] Collected: 10/14/22 1719    Specimen: Blood Updated: 10/14/22 1831    Narrative:      The following orders were created for panel order [place] Draw.  Procedure                               Abnormality         Status                     ---------                               -----------         ------                     Green Top (Gel)[[id number]]                                  Final result               Lavender Top[[id number]]                                     Final result               Red Top[[id number]]                                          Final result               Light Blue Top[[id number]]                                   Final result                 Please view results for these tests on the individual orders.    Lavender Top [[id number]] Collected: 10/14/22 1719    Specimen: Blood Updated: 10/14/22 1831     Extra Tube hold for add-on     Comment: Auto resulted       Green Top (Gel) [[id number]] Collected: 10/14/22 1719    Specimen: Blood Updated: 10/14/22 1831     Extra Tube Hold for add-ons.     Comment: Auto resulted.       Red Top [[id number]] Collected: 10/14/22 1719    Specimen: Blood Updated: 10/14/22 1831     Extra Tube Hold for add-ons.     Comment: Auto resulted.       Light Blue Top [[id number]] Collected: 10/14/22 1719    Specimen: Blood Updated: 10/14/22 1831     Extra Tube Hold for add-ons.     Comment: Auto resulted       Comprehensive Metabolic Panel [[id number]]  (Abnormal) Collected: 10/14/22 1719    Specimen: Blood Updated: 10/14/22 1754     Glucose 206 mg/dL      BUN 18 mg/dL      Creatinine 1.19 mg/dL      Sodium 136 mmol/L      Potassium 4.6 mmol/L      Comment: Slight hemolysis detected by analyzer. Results  may be affected.        Chloride 100 mmol/L      CO2 22.0 mmol/L      Calcium 9.6 mg/dL      Total Protein 7.6 g/dL      Albumin 4.50 g/dL      ALT (SGPT) 22 U/L      AST (SGOT) 26 U/L      Comment: Slight hemolysis detected by analyzer. Results may be affected.        Alkaline Phosphatase 109 U/L      Total Bilirubin 0.3 mg/dL      Globulin 3.1 gm/dL      A/G Ratio 1.5 g/dL      BUN/Creatinine Ratio 15.1     Anion Gap 14.0 mmol/L      eGFR 69.9 mL/min/1.73      Comment: National Kidney Foundation and American Society of Nephrology (ASN) Task Force recommended calculation based on the Chronic Kidney Disease Epidemiology Collaboration (CKD-EPI) equation refit without adjustment for race.       Narrative:      GFR Normal >60  Chronic Kidney Disease <60  Kidney Failure <15      Troponin [[id number]]  (Normal) Collected: 10/14/22 1719    Specimen: Blood Updated: 10/14/22 1752     Troponin T <0.010 ng/mL     Narrative:      Troponin T Reference Range:  <= 0.03 ng/mL-   Negative for AMI  >0.03 ng/mL-     Abnormal for myocardial necrosis.  Clinicians would have to utilize clinical acumen, EKG, Troponin and serial changes to determine if it is an Acute Myocardial Infarction or myocardial injury due to an underlying chronic condition.       Results may be falsely decreased if patient taking Biotin.      Protime-INR [[id number]]  (Normal) Collected: 10/14/22 1719    Specimen: Blood Updated: 10/14/22 1745     Protime 12.4 Seconds      INR 0.96    aPTT [[id number]]  (Normal) Collected: 10/14/22 1719    Specimen: Blood Updated: 10/14/22 1745     PTT 31.0 seconds     POC Glucose Once [[id number]]  (Abnormal) Collected: 10/14/22 1724    Specimen: Blood Updated: 10/14/22 1737     Glucose 200 mg/dL      Comment: : [id number] Samir ElaineMeter ID: [id number]       CBC & Differential [[id number]]  (Normal) Collected: 10/14/22 1719    Specimen: Blood Updated: 10/14/22 1734    Narrative:      The following orders were created for  panel order CBC & Differential.  Procedure                               Abnormality         Status                     ---------                               -----------         ------                     CBC Auto Differential[[id number]]        Normal              Final result                 Please view results for these tests on the individual orders.    CBC Auto Differential [[id number]]  (Normal) Collected: 10/14/22 3149    Specimen: Blood Updated: 10/14/22 1734     WBC 7.74 10*3/mm3      RBC 5.50 10*6/mm3      Hemoglobin 16.9 g/dL      Hematocrit 47.5 %      MCV 86.4 fL      MCH 30.7 pg      MCHC 35.6 g/dL      RDW 12.8 %      RDW-SD 40.1 fl      MPV 9.8 fL      Platelets 182 10*3/mm3      Neutrophil % 53.3 %      Lymphocyte % 30.9 %      Monocyte % 10.1 %      Eosinophil % 5.0 %      Basophil % 0.4 %      Immature Grans % 0.3 %      Neutrophils, Absolute 4.13 10*3/mm3      Lymphocytes, Absolute 2.39 10*3/mm3      Monocytes, Absolute 0.78 10*3/mm3      Eosinophils, Absolute 0.39 10*3/mm3      Basophils, Absolute 0.03 10*3/mm3      Immature Grans, Absolute 0.02 10*3/mm3      nRBC 0.0 /100 WBC           Imaging Results (Last 24 Hours)     Procedure Component Value Units Date/Time    MRI Brain Without Contrast [[id number]] Resulted: 10/15/22 1043     Updated: 10/15/22 1043    XR Chest 1 View [[id number]] Collected: 10/14/22 1816     Updated: 10/14/22 1820    Narrative:      HISTORY: Acute stroke protocol, left-sided weakness     CXR: Frontal view the chest obtained.     COMPARISON: None     FINDINGS: Density adjacent the left cardiac apex favorable for  epicardial fat. Lungs are free of consolidation, collapse, edema. Heart  is upper limits of normal in size. No pleural effusion or pneumothorax.  No acute regional bony pathology.       Impression:      1. No acute radiographic cardiopulmonary process.  This report was finalized on 10/14/2022 18:16 by Dr. Letitia Cornejo MD.    CT Angiogram Head w AI Analysis of LVO  [[id number]] Collected: 10/14/22 1746     Updated: 10/14/22 1752    Narrative:      CT ANGIOGRAM HEAD W AI ANALYSIS OF LVO- 10/14/2022 5:18 PM CDT     HISTORY: Stroke, follow up; left-sided weakness     COMPARISON: None     DOSE LENGTH PRODUCT: 196 mGy cm. Automated exposure control was also  utilized to decrease patient radiation dose.     TECHNIQUE: Axial images of brain are obtained following IV contrast. 2-D  and maximal intensity projection images are reviewed. AI analysis of LVO  was utilized.        FINDINGS:  Minimal calcification considered within the cavernous  segments of the distal internal carotid arteries creating less than 25%  stenosis. 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  Bilateral anterior middle cerebral arteries are patent and. Hypoplastic  right vertebral artery with the basilar artery supplied by the left  vertebral artery. Mild calcified plaque of the distal left vertebral  artery creating less than 50% stenosis. There is artery slightly small  in caliber with no focal high-grade stenosis. Bilateral posterior  cerebral arteries are patent. No large vessel occlusion. No aneurysm or  dissection. No abnormal intracranial enhancement.       Impression:      1. No large vessel occlusion. No aneurysm or dissection.  This report was finalized on 10/14/2022 17:49 by Dr. Letitia Cornejo MD.    CT Angiogram Neck [[id number]] Collected: 10/14/22 1742     Updated: 10/14/22 1748    Narrative:      CT ANGIOGRAM NECK- 10/14/2022 5:18 PM CDT     HISTORY: Stroke, follow up; left-sided weakness     COMPARISON: None     DOSE LENGTH PRODUCT: 196 mGy cm. Automated exposure control was also  utilized to decrease patient radiation dose.     TECHNIQUE: Axial images the neck are obtained following IV contrast. 2-D  and maximal intensity projection images are reviewed.     FINDINGS:  Mild calcification of the arch of the thoracic aorta. Bovine  variant to the arch with a common origin the right  brachiocephalic and  left common carotid arteries. Right brachiocephalic artery is patent. No  subclavian artery stenosis identified. The bilateral common, internal,  and external carotid arteries are widely patent. Vertebral arteries  originate from the proximal subclavian arteries as expected. Prominent  hypoplasia right vertebral artery with dominant left vertebral artery.  Mild calcified plaque of the distal left vertebral artery creating less  than 30% stenosis. Basilar artery is supplied by the left vertebral  artery. No focal high-grade stenosis. No aneurysm or dissection.     Mosaic appearance to the visible upper lobe parenchyma may be seen with  air trapping. No apical pneumothorax. No pathologic lymphadenopathy or  soft tissue mass identified within the visible plaque. Slight  exaggerated lordosis of the cervical spine.       Impression:      1. Widely patent bilateral extracranial carotid arteries. 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 Basilar artery supplied by the  left vertebral artery. No focal high-grade stenosis. No aneurysm or  dissection.  This report was finalized on 10/14/2022 17:45 by Dr. Letitia Cornejo MD.    CT Head Without Contrast Stroke Protocol [[id number]] Collected: 10/14/22 1723     Updated: 10/14/22 1729    Narrative:      CT HEAD WO CONTRAST STROKE PROTOCOL- 10/14/2022 5:17 PM CDT     HISTORY: Neuro deficit, acute, stroke suspected, left-sided weakness  with numbness     COMPARISON: None     DOSE LENGTH PRODUCT: 696 mGy cm. Automated exposure control was also  utilized to decrease patient radiation dose.     TECHNIQUE: Axial images of the brain are obtained without IV contrast.     FINDINGS:  The ventricles are normal in size and configuration. There is  no intracranial hemorrhage or mass effect. There are no acute signs of  ischemia based on CT imaging. No extra-axial hematoma or subarachnoid  hemorrhage.     No air-fluid levels within the visible paranasal sinuses.  Mastoid air  cells well-aerated. The bony calvarium appears intact.       Impression:      1. No acute intracranial abnormality identified. No intracranial  hemorrhage. No acute signs of ischemia based on CT exam.     Comments: Findings called to Magda in the ER at 5:26 PM on 10/14/2022  This report was finalized on 10/14/2022 17:26 by Dr. Letitia Cornejo MD.          Assessment/Plan     Hospital Problem List      Cerebrovascular accident (CVA), unspecified mechanism (HCC)    Hypertension    Hyperlipidemia-welchlor    Gastroesophageal reflux disease    Type 2 diabetes mellitus with hyperglycemia (HCC)      Impression    • Acute onset left-sided numbness and mild weakness  • Probable right thalamic CVA--awaiting MRI  • Diabetes mellitus  • Hypertension, essential  • Diabetic peripheral polyneuropathy, BLEs    Plan    • Aspirin 81 mg daily  • If MRI positive for stroke, and add Plavix 75 mg for 1 month then back to aspirin 81 mg daily  • High-dose atorvastatin 80 mg daily.  Target LDL less than 70.  • Improved glycemic control with target hemoglobin A1c of 6.5%.  • I have recommended instituting regular cardiovascular exercise in the form of walking, biking or swimming 30-40 minutes at a time at least 3-4 times per week.  • Await MRI brain.  • Await 2D echocardiogram.  • Likely can discharge home later today following MRI and completion of echocardiogram.          Harsh Simmons PA-C  10/15/22  10:44 CDT

## 2022-10-15 NOTE — THERAPY DISCHARGE NOTE
Acute Care - Occupational Therapy Discharge  [facility]    Patient Name: Abdullahi MACE  : 1960    MRN: [id number]                              Today's Date: 10/15/2022       Admit Date: 10/14/2022    Visit Dx:     ICD-10-CM ICD-9-CM   1. Cerebrovascular accident (CVA), unspecified mechanism (HCC)  I63.9 434.91     Patient Active Problem List   Diagnosis   • Wellness examination-done   • Obesity   • Diabetes type 2, controlled (HCC)   • Erectile dysfunction   • Hypertension   • Hyperlipidemia-welchlor   • Neuropathy-offered Rx   • Cardiac arrhythmia-benign PVC   • Laboratory test*   • Cellulitis of groin   • Hypercalcemia-watching   • Diabetes type 2, uncontrolled   • Renal insufficiency   • Diabetic foot infection (HCC)   • History of 2019 novel coronavirus disease (COVID-19)   • History of adenomatous polyp of colon   • Family hx of colon cancer   • Family hx colonic polyps   • Esophageal dysphagia   • Gastroesophageal reflux disease   • H/O gastroesophageal reflux (GERD)   • Cerebrovascular accident (CVA), unspecified mechanism (HCC)   • Type 2 diabetes mellitus with hyperglycemia (HCC)     Past Medical History:   Diagnosis Date   • Cerebrovascular accident (CVA), unspecified mechanism (HCC) 10/14/2022   • Diabetes mellitus (HCC)    • Hypertension    • Premature ventricular beats      Past Surgical History:   Procedure Laterality Date   • APPENDECTOMY     • COLONOSCOPY  10/22/2013   • COLONOSCOPY N/A 2022    Procedure: COLONOSCOPY WITH ANESTHESIA;  Surgeon: Rain Nobles MD;  Location: [facility] ENDOSCOPY;  Service: Gastroenterology;  Laterality: N/A;  preop; hx of polyps  postop; hemmhroids   PCP Davi Gandhi    • ENDOSCOPY N/A 2022    Procedure: ESOPHAGOGASTRODUODENOSCOPY WITH ANESTHESIA;  Surgeon: Rian Nobles MD;  Location: [facility] ENDOSCOPY;  Service: Gastroenterology;  Laterality: N/A;  preop; dysphagia  postop; r/o barretts; esophagitis   PCP Davi Gandhi       General Information      Row Name 10/15/22 08          OT Time and Intention    Document Type evaluation  Pt presents with L sided numbness and weakness (face, arm, and leg).  CVA vs TIA pending MRI  -CS     Mode of Treatment occupational therapy  -CS     Row Name 10/15/22 0840          General Information    Patient Profile Reviewed yes  -CS     Prior Level of Function independent:;all household mobility;ADL's;dressing;bathing;driving  -CS     Existing Precautions/Restrictions fall  -CS     Row Name 10/15/22 08          Living Environment    People in Home spouse  -CS     Row Name 10/15/22 08          Cognition    Orientation Status (Cognition) oriented x 4  -CS     Row Name 10/15/22 0840          Safety Issues, Functional Mobility    Impairments Affecting Function (Mobility) strength;sensation/sensory awareness  -CS           User Key  (r) = Recorded By, (t) = Taken By, (c) = Cosigned By    Initials Name Provider Type    CS Vivian Alonso S, OTR/L, CNT Occupational Therapist               Mobility/ADL's     Row Name 10/15/22 0840          Bed Mobility    Bed Mobility bed mobility (all) activities  -CS     All Activities, [place] (Bed Mobility) independent  -CS     Row Name 10/15/22 0840          Transfers    Transfers sit-stand transfer;stand-sit transfer  -CS     Row Name 10/15/22 0840          Sit-Stand Transfer    Sit-Stand [place] (Transfers) independent  -CS     Row Name 10/15/22 0840          Stand-Sit Transfer    Stand-Sit [place] (Transfers) independent  -CS     Row Name 10/15/22 0840          Functional Mobility    Functional Mobility- Ind. Level independent  -CS     Row Name 10/15/22 0840          Activities of Daily Living    BADL Assessment/Intervention lower body dressing  -CS     Row Name 10/15/22 0840          Lower Body Dressing Assessment/Training    [place] Level (Lower Body Dressing) don;doff;socks;shoes/slippers;independent  -CS     Position (Lower Body Dressing) edge of bed sitting  -CS            User Key  (r) = Recorded By, (t) = Taken By, (c) = Cosigned By    Initials Name Provider Type    Vivian Castro, OTR/L, PORFIRIO Occupational Therapist               Obj/Interventions     Row Name 10/15/22 0840          Sensory Assessment (Somatosensory)    Sensory Assessment (Somatosensory) left UE  -CS     Left UE Sensory Assessment impaired  -CS     Sensory Assessment Pt able to report accurate tactile stimulation and localization to LUE and LLE.  Pt just reports diminished sensation on L side compared to R side.  -CS     Row Name 10/15/22 08          Vision Assessment/Intervention    Visual Impairment/Limitations corrective lenses full-time;WNL  -     Row Name 10/15/22 08          Range of Motion Comprehensive    General Range of Motion bilateral upper extremity ROM WFL  -[facility] Name 10/15/22 08          Strength Comprehensive (MMT)    Comment, General Manual Muscle Testing (MMT) Assessment RUE: 5/5, LUE: 4+/5  -CS     [facility] Name 10/15/22 08          Motor Skills    Motor Skills coordination  -CS     Coordination left;upper extremity;fine motor deficit;minimal impairment;WFL  only due to increased time required to complete opposition compared to L hand  -CS     Row Name 10/15/22 0840          Balance    Balance Assessment sitting static balance;sitting dynamic balance;standing static balance;standing dynamic balance  -CS     Static Sitting Balance independent  -CS     Dynamic Sitting Balance independent  -CS     Position, Sitting Balance unsupported;sitting edge of bed  -CS     Static Standing Balance independent  -CS     Dynamic Standing Balance independent  -CS     Position/Device Used, Standing Balance unsupported  -CS           User Key  (r) = Recorded By, (t) = Taken By, (c) = Cosigned By    Initials Name Provider Type    Vivian Castro, OTR/L, PORFIRIO Occupational Therapist               Goals/Plan    No documentation.                Clinical Impression     Row Name 10/15/22 0840           Pain Assessment    Pretreatment Pain Rating 0/10 - no pain  diminished sensation on L side of body (arm, leg, face)  -CS     Posttreatment Pain Rating 0/10 - no pain  -CS     Row Name 10/15/22 0840          Plan of Care Review    Plan of Care Reviewed With patient  -CS     Progress no change  -CS     Outcome Evaluation OT evaluation completed.  Pt is A&Ox4.  He demo no further need for skilled OT services at this time.  Pt completes bed mobility, functional transfers, and mobility in the hallway independently.  He completes LB dressing and sink side grooming independently as well.  Pt does demonstrate some mild LUE weakness and diminished sensation.  His strength is very functional, however OT provided him with handout and verbal directions for LUE strengthening HEP.  Pt is accurate in report of stimuli, temperature, and localization to sensation to LUE, however he reports it feeling diminished compared to his RUE.  OT provided edu to pt regarding sensory desensitization techniques he can perform at home to maximize sensory function.  Pt verbalized understanding.  OT will sign off at this time and recommend pt to discharge home with assist.  -CS     Row Name 10/15/22 0840          Therapy Assessment/Plan (OT)    Patient/Family Therapy Goal Statement (OT) to go home  -CS     Criteria for Skilled Therapeutic Interventions Met (OT) no;no problems identified which require skilled intervention  -CS     Therapy Frequency (OT) evaluation only  -CS     Row Name 10/15/22 0840          Therapy Plan Review/Discharge Plan (OT)    Anticipated Discharge Disposition (OT) home with assist  -CS     Row Name 10/15/22 0838          Positioning and Restraints    Pre-Treatment Position in bed  -CS     Post Treatment Position chair  -CS     In Chair sitting;call light within reach;encouraged to call for assist;with family/caregiver;notified nsg  -CS           User Key  (r) = Recorded By, (t) = Taken By, (c) = Cosigned By     Initials Name Provider Type    Vivian Castro, OTR/L, PORFIRIO Occupational Therapist               Outcome Measures     Row Name 10/15/22 0840          How much help from another is currently needed...    Putting on and taking off regular lower body clothing? 4  -CS     Bathing (including washing, rinsing, and drying) 4  -CS     Toileting (which includes using toilet bed pan or urinal) 4  -CS     Putting on and taking off regular upper body clothing 4  -CS     Taking care of personal grooming (such as brushing teeth) 4  -CS     Eating meals 4  -CS     AM-PAC 6 Clicks Score (OT) 24  -CS     Row Name 10/15/22 0840          Modified [place] Scale    Pre-Stroke Modified [place] Scale 0 - No Symptoms at all.  -CS     Modified [place] Scale 1 - No significant disability despite symptoms.  Able to carry out all usual duties and activities.  -     Row Name 10/15/22 0840          Functional Assessment    Outcome Measure Options AM-PAC 6 Clicks Daily Activity (OT);Modified [place]  -CS           User Key  (r) = Recorded By, (t) = Taken By, (c) = Cosigned By    Initials Name Provider Type    Vivian Castro OTR/L, PORFIRIO Occupational Therapist              Occupational Therapy Education     Title: PT OT SLP Therapies (Done)     Topic: Occupational Therapy (Done)     Point: ADL training (Done)     Description:   Instruct learner(s) on proper safety adaptation and remediation techniques during self care or transfers.   Instruct in proper use of assistive devices.              Learning Progress Summary           Patient Acceptance, E, VU by  at 10/15/2022 0930                   Point: Home exercise program (Done)     Description:   Instruct learner(s) on appropriate technique for monitoring, assisting and/or progressing therapeutic exercises/activities.              Learning Progress Summary           Patient Acceptance, E, VU by  at 10/15/2022 0930                   Point: Precautions (Done)     Description:   Instruct  learner(s) on prescribed precautions during self-care and functional transfers.              Learning Progress Summary           Patient Acceptance, E, VU by  at 10/15/2022 0930                   Point: Body mechanics (Done)     Description:   Instruct learner(s) on proper positioning and spine alignment during self-care, functional mobility activities and/or exercises.              Learning Progress Summary           Patient Acceptance, E, VU by  at 10/15/2022 0930                               User Key     Initials Effective Dates Name Provider Type Discipline     06/16/21 -  Vivian Alonso, OTR/L, CNT Occupational Therapist OT              OT Recommendation and Plan  Therapy Frequency (OT): evaluation only  Plan of Care Review  Plan of Care Reviewed With: patient  Progress: no change  Outcome Evaluation: OT evaluation completed.  Pt is A&Ox4.  He demo no further need for skilled OT services at this time.  Pt completes bed mobility, functional transfers, and mobility in the hallway independently.  He completes LB dressing and sink side grooming independently as well.  Pt does demonstrate some mild LUE weakness and diminished sensation.  His strength is very functional, however OT provided him with handout and verbal directions for LUE strengthening HEP.  Pt is accurate in report of stimuli, temperature, and localization to sensation to LUE, however he reports it feeling diminished compared to his RUE.  OT provided edu to pt regarding sensory desensitization techniques he can perform at home to maximize sensory function.  Pt verbalized understanding.  OT will sign off at this time and recommend pt to discharge home with assist.  Plan of Care Reviewed With: patient  Outcome Evaluation: OT evaluation completed.  Pt is A&Ox4.  He demo no further need for skilled OT services at this time.  Pt completes bed mobility, functional transfers, and mobility in the hallway independently.  He completes LB dressing and  sink side grooming independently as well.  Pt does demonstrate some mild LUE weakness and diminished sensation.  His strength is very functional, however OT provided him with handout and verbal directions for LUE strengthening HEP.  Pt is accurate in report of stimuli, temperature, and localization to sensation to LUE, however he reports it feeling diminished compared to his RUE.  OT provided edu to pt regarding sensory desensitization techniques he can perform at home to maximize sensory function.  Pt verbalized understanding.  OT will sign off at this time and recommend pt to discharge home with assist.     Time Calculation:    Time Calculation- OT     Row Name 10/15/22 0930             Time Calculation- OT    OT Start Time 0840  -CS      OT Stop Time 0921  -CS      OT Time Calculation (min) 41 min  -CS      OT Received On 10/15/22  -CS         Untimed Charges    OT Eval/Re-eval Minutes 41  -CS         Total Minutes    Untimed Charges Total Minutes 41  -CS       Total Minutes 41  -CS            User Key  (r) = Recorded By, (t) = Taken By, (c) = Cosigned By    Initials Name Provider Type    CS Vivian Alonso OTR/L, PORFIRIO Occupational Therapist              Therapy Charges for Today     Code Description Service Date Service Provider Modifiers Qty    [id number]  OT EVAL LOW COMPLEXITY 3 10/15/2022 Vivian Alonso OTR/L, CNT GO 1             OT Discharge Summary  Anticipated Discharge Disposition (OT): home with assist  Reason for Discharge: Independent  Outcomes Achieved: Other (1x OT eval)  Discharge Destination: Home with assist    ALEJANDRA Moyer/L, CNT  10/15/2022

## 2022-10-15 NOTE — DISCHARGE SUMMARY
[facility] Medicine Services  DISCHARGE SUMMARY       Date of Admission: 10/14/2022  Date of Discharge:  10/15/2022  Primary Care Physician: Davi Gandhi MD    Presenting Problem/Chief Complaint:  Left-sided numbness and weakness    Final Discharge Diagnoses:  Active Hospital Problems    Diagnosis    • **Left sided numbness    • Type 2 diabetes mellitus with hyperglycemia (HCC)    • Gastroesophageal reflux disease      Added automatically from request for surgery [id number]     • Hyperlipidemia-welchlor    • Hypertension    • Class 1 obesity in adult        Consults: Dr. Chisholm with neurology.    Procedures Performed: None.    Pertinent Test Results:   Results for orders placed during the hospital encounter of 10/14/22    Adult Transthoracic Echo Complete W/ Cont if Necessary Per Protocol (With Agitated Saline)    Interpretation Summary  •  Left ventricular ejection fraction appears to be 46 - 50%. Left ventricular systolic function is mildly decreased.  •  Left ventricular diastolic function was normal.  •  Saline test results are negative.  •  Estimated right ventricular systolic pressure from tricuspid regurgitation is normal (<35 mmHg).  •  Mild dilation of the aortic root is present.      Imaging Results (All)     Procedure Component Value Units Date/Time    MRI Brain Without Contrast [[id number]] Collected: 10/15/22 1123     Updated: 10/15/22 1133    Narrative:      EXAMINATION:  MRI BRAIN WO CONTRAST-  10/15/2022 10:35 AM CDT     HISTORY: CODE STROKE. Left-sided weakness and numbness. Possible right  thalamic CVA; I63.9-Cerebral infarction, unspecified.     TECHNIQUE: Multiplanar imaging was performed in a high field magnet.     COMPARISON: No comparison study.     FINDINGS: No structural abnormalities are appreciated. The ventricular  system is nondilated. There are scattered areas of T2 high signal within  the hemispheric white matter. No mass lesions are  identified. There are  no areas of signal abnormality on the diffusion weighted sequence.       Impression:      1. No evidence of acute infarct.  2. Scattered areas of T2 high signal within the hemispheric white matter  are nonspecific and likely due to chronic small vessel disease.     Results were called to [place], the charge nurse on 3A at 11:28 AM.        This report was finalized on 10/15/2022 11:29 by Dr. Kavin Salinas MD.    XR Chest 1 View [[id number]] Collected: 10/14/22 1816     Updated: 10/14/22 1820    Narrative:      HISTORY: Acute stroke protocol, left-sided weakness     CXR: Frontal view the chest obtained.     COMPARISON: None     FINDINGS: Density adjacent the left cardiac apex favorable for  epicardial fat. Lungs are free of consolidation, collapse, edema. Heart  is upper limits of normal in size. No pleural effusion or pneumothorax.  No acute regional bony pathology.       Impression:      1. No acute radiographic cardiopulmonary process.  This report was finalized on 10/14/2022 18:16 by Dr. Letitia Cornejo MD.    CT Angiogram Head w AI Analysis of LVO [[id number]] Collected: 10/14/22 1746     Updated: 10/14/22 1752    Narrative:      CT ANGIOGRAM HEAD W AI ANALYSIS OF LVO- 10/14/2022 5:18 PM CDT     HISTORY: Stroke, follow up; left-sided weakness     COMPARISON: None     DOSE LENGTH PRODUCT: 196 mGy cm. Automated exposure control was also  utilized to decrease patient radiation dose.     TECHNIQUE: Axial images of brain are obtained following IV contrast. 2-D  and maximal intensity projection images are reviewed. AI analysis of LVO  was utilized.        FINDINGS:  Minimal calcification considered within the cavernous  segments of the distal internal carotid arteries creating less than 25%  stenosis. 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  Bilateral anterior middle cerebral arteries are patent and. Hypoplastic  right vertebral artery with the basilar artery supplied by the  left  vertebral artery. Mild calcified plaque of the distal left vertebral  artery creating less than 50% stenosis. There is artery slightly small  in caliber with no focal high-grade stenosis. Bilateral posterior  cerebral arteries are patent. No large vessel occlusion. No aneurysm or  dissection. No abnormal intracranial enhancement.       Impression:      1. No large vessel occlusion. No aneurysm or dissection.  This report was finalized on 10/14/2022 17:49 by Dr. Letitia Cornejo MD.    CT Angiogram Neck [[id number]] Collected: 10/14/22 1742     Updated: 10/14/22 1748    Narrative:      CT ANGIOGRAM NECK- 10/14/2022 5:18 PM CDT     HISTORY: Stroke, follow up; left-sided weakness     COMPARISON: None     DOSE LENGTH PRODUCT: 196 mGy cm. Automated exposure control was also  utilized to decrease patient radiation dose.     TECHNIQUE: Axial images the neck are obtained following IV contrast. 2-D  and maximal intensity projection images are reviewed.     FINDINGS:  Mild calcification of the arch of the thoracic aorta. Bovine  variant to the arch with a common origin the right brachiocephalic and  left common carotid arteries. Right brachiocephalic artery is patent. No  subclavian artery stenosis identified. The bilateral common, internal,  and external carotid arteries are widely patent. Vertebral arteries  originate from the proximal subclavian arteries as expected. Prominent  hypoplasia right vertebral artery with dominant left vertebral artery.  Mild calcified plaque of the distal left vertebral artery creating less  than 30% stenosis. Basilar artery is supplied by the left vertebral  artery. No focal high-grade stenosis. No aneurysm or dissection.     Mosaic appearance to the visible upper lobe parenchyma may be seen with  air trapping. No apical pneumothorax. No pathologic lymphadenopathy or  soft tissue mass identified within the visible plaque. Slight  exaggerated lordosis of the cervical spine.        Impression:      1. Widely patent bilateral extracranial carotid arteries. 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 Basilar artery supplied by the  left vertebral artery. No focal high-grade stenosis. No aneurysm or  dissection.  This report was finalized on 10/14/2022 17:45 by Dr. Letitia Cornejo MD.    CT Head Without Contrast Stroke Protocol [[id number]] Collected: 10/14/22 1723     Updated: 10/14/22 1729    Narrative:      CT HEAD WO CONTRAST STROKE PROTOCOL- 10/14/2022 5:17 PM CDT     HISTORY: Neuro deficit, acute, stroke suspected, left-sided weakness  with numbness     COMPARISON: None     DOSE LENGTH PRODUCT: 696 mGy cm. Automated exposure control was also  utilized to decrease patient radiation dose.     TECHNIQUE: Axial images of the brain are obtained without IV contrast.     FINDINGS:  The ventricles are normal in size and configuration. There is  no intracranial hemorrhage or mass effect. There are no acute signs of  ischemia based on CT imaging. No extra-axial hematoma or subarachnoid  hemorrhage.     No air-fluid levels within the visible paranasal sinuses. Mastoid air  cells well-aerated. The bony calvarium appears intact.       Impression:      1. No acute intracranial abnormality identified. No intracranial  hemorrhage. No acute signs of ischemia based on CT exam.     Comments: Findings called to Magda in the ER at 5:26 PM on 10/14/2022  This report was finalized on 10/14/2022 17:26 by Dr. Letitia Cornejo MD.          LAB RESULTS:      Lab 10/14/22  1719   WBC 7.74   HEMOGLOBIN 16.9   HEMATOCRIT 47.5   PLATELETS 182   NEUTROS ABS 4.13   IMMATURE GRANS (ABS) 0.02   LYMPHS ABS 2.39   MONOS ABS 0.78   EOS ABS 0.39   MCV 86.4   PROTIME 12.4   APTT 31.0         Lab 10/15/22  0413 10/14/22  1719   SODIUM  --  136   POTASSIUM  --  4.6   CHLORIDE  --  100   CO2  --  22.0   ANION GAP  --  14.0   BUN  --  18   CREATININE  --  1.19   EGFR  --  69.9   GLUCOSE  --  206*   CALCIUM  --  9.6    HEMOGLOBIN A1C 8.70*  --          Lab 10/14/22  1719   TOTAL PROTEIN 7.6   ALBUMIN 4.50   GLOBULIN 3.1   ALT (SGPT) 22   AST (SGOT) 26   BILIRUBIN 0.3   ALK PHOS 109         Lab 10/14/22  1719   TROPONIN T <0.010   PROTIME 12.4   INR 0.96         Lab 10/15/22  0413   CHOLESTEROL 126   LDL CHOL 76   HDL CHOL 33*   TRIGLYCERIDES 85         Lab 10/14/22  1719   ABO TYPING A   RH TYPING Positive   ANTIBODY SCREEN Negative         Brief Urine Lab Results     None        Microbiology Results (last 10 days)     ** No results found for the last 240 hours. **          Chief Complaint on Day of Discharge: Denies any acute complaints.  He feels ready for discharge.    Hospital Course:  The patient is a 62 y.o. male who presented to [facility] with left-sided numbness and weakness.  He has a past medical history significant for insulin-dependent diabetes, hypertension, and gastroesophageal reflux disease.  He follows with Dr. Gandhi for his primary care.  Patient has not been to his PCP in over a year due to COVID pandemic.  On 10/14 he was with his family at a [place] patch when he noticed he had difficulty with sensation of the left arm and left leg with some weakness of the left leg.  He does have known diabetic peripheral neuropathy that affects both lower extremities.  In the ED, stat CT of the head negative for acute intracranial process. CT of the head and neck was negative for large vessel occlusion, but did demonstrate mild, diffuse atheromatous disease and a very diminutive right vertebral artery and a left dominant vertebral system.  The patient was not felt to be a candidate for tPA administration as his symptoms were predominantly sensory and NIHSS 2.  Patient elected not to proceed.  He was admitted to the hospitalist service for further evaluation and management.    He was seen in consultation by Dr. Chisholm.  MRI brain showed no evidence of acute infarct.  LDL 76.  Continue aspirin 81 mg daily.  He  "is okay for discharge from neurology standpoint.    PT/OT/SLP have evaluated.  He has no needs.    A1c 8.70.  A1c in September 2021 was 10.4.  Needs improved glycemic control with target hemoglobin A1c less than 7.  He does have an insulin meter at home and is compliant with taking his medications as prescribed.  Patient states that he seldomly checks his blood glucose at home.  Registered dietitian has met with patient today.    Transthoracic echocardiogram showed mildly decreased ejection fraction 46-50%.  He does take lisinopril at home.  He has had a labile blood pressure.  Recommend to check blood pressure at home and keep a log of readings to bring to follow-up appointment with primary care provider.  Could consider low-dose beta-blocker as outpatient if blood pressure will allow it.    All labs reviewed.  Home medication reviewed and resumed as appropriate.  He is medically stable for discharge home.    Condition on Discharge:  Medically stable.    Physical Exam on Discharge:  /71 (BP Location: Right arm, Patient Position: Sitting)   Pulse 90   Temp 97.4 °F (36.3 °C) (Oral)   Resp 18   Ht 180.3 cm (71\")   Wt 98 kg (216 lb)   SpO2 97%   BMI 30.13 kg/m²   Physical Exam  Vitals reviewed.   Constitutional:       General: He is not in acute distress.     Appearance: He is obese. He is not toxic-appearing.      Comments: Up in chair.  Multiple family members at bedside.  No acute distress.  On room air.   HENT:      Head: Normocephalic and atraumatic.      Mouth/Throat:      Mouth: Mucous membranes are moist.      Pharynx: Oropharynx is clear.   Eyes:      Extraocular Movements: Extraocular movements intact.      Conjunctiva/sclera: Conjunctivae normal.      Pupils: Pupils are equal, round, and reactive to light.   Cardiovascular:      Rate and Rhythm: Normal rate and regular rhythm.      Pulses: Normal pulses.      Comments: Normal sinus rhythm 75-81 bpm  Pulmonary:      Effort: Pulmonary effort is " normal. No respiratory distress.      Breath sounds: Normal breath sounds. No wheezing.   Abdominal:      General: Bowel sounds are normal. There is no distension.      Palpations: Abdomen is soft.      Tenderness: There is no abdominal tenderness.   Musculoskeletal:         General: No swelling or tenderness. Normal range of motion.      Cervical back: Normal range of motion and neck supple. No muscular tenderness.   Skin:     General: Skin is warm and dry.      Findings: No erythema or rash.   Neurological:      General: No focal deficit present.      Mental Status: He is alert and oriented to person, place, and time.      Cranial Nerves: No cranial nerve deficit.      Motor: No weakness.   Psychiatric:         Mood and Affect: Mood normal.         Behavior: Behavior normal.       Discharge Disposition:  Home or Self Care    Discharge Medications:     Discharge Medications      Continue These Medications      Instructions Start Date   aspirin 81 MG EC tablet  Notes to patient: Next dose due in am 10-16   81 mg, Oral, Daily      B-D UF III MINI PEN NEEDLES 31G X 5 MM misc  Generic drug: Insulin Pen Needle  Notes to patient: Next dose due tonight 10-15   1 Device, Does not apply, 4 Times Daily      BASAGLAR KWIKPEN 100 UNIT/ML injection pen  Notes to patient: Next dose due in am 10-16   50 Units, Subcutaneous, Daily      colesevelam 625 MG tablet  Commonly known as: WELCHOL  Notes to patient: Next dose due tonight 10-15   TAKE 3 TABLETS BY MOUTH TWICE DAILY WITH MEALS      Dulaglutide 3 MG/0.5ML solution pen-injector  Notes to patient: Next dose due in am 10-16   3 mg, Subcutaneous, Weekly      Jardiance 25 MG tablet tablet  Generic drug: empagliflozin  Notes to patient: Next dose due in am 10-16   TAKE 1 TABLET DAILY      lisinopril 10 MG tablet  Commonly known as: PRINIVILZESTRIL  Notes to patient: Next dose due in am 10-16   10 mg, Oral, Daily      metFORMIN 500 MG tablet  Commonly known as: GLUCOPHAGE  Notes to  patient: Next dose due tonight 10-15   1,000 mg, Oral, 2 Times Daily With Meals      NovoLOG FlexPen 100 UNIT/ML solution pen-injector sc pen  Generic drug: insulin aspart  Notes to patient: Next dose due tonight 10-15   7 Units, Subcutaneous, 3 Times Daily With Meals      pantoprazole 40 MG EC tablet  Commonly known as: PROTONIX  Notes to patient: Next dose due in am 10-16   40 mg, Oral, Daily      tadalafil 20 MG tablet  Commonly known as: CIALIS   20 mg, Oral, Daily PRN             Discharge Diet:   Diet Instructions     Diet: Soft Texture, Consistent Carbohydrate; Thin Liquids, No Restrictions; Whole      Discharge Diet:  Soft Texture  Consistent Carbohydrate       Fluid Consistency: Thin Liquids, No Restrictions    Soft Options: Whole          Activity at Discharge:   Activity Instructions     Activity as Tolerated            Discharge Care Plan/Instructions:   1.  Seek evaluation for worsening symptoms    Follow-up Appointments:   1.  Follow-up with Dr. Gandhi in 1 week.    Test Results Pending at Discharge: none.    Electronically signed by NICHOLAS Bob, 10/15/22, 14:02 CDT.    Time: 35 minutes.

## 2022-10-15 NOTE — PLAN OF CARE
Goal Outcome Evaluation:               Patient to be discharged home with family in stable condition.

## 2022-10-17 ENCOUNTER — OFFICE VISIT (OUTPATIENT)
Dept: FAMILY MEDICINE CLINIC | Facility: CLINIC | Age: 62
End: 2022-10-17

## 2022-10-17 ENCOUNTER — TRANSITIONAL CARE MANAGEMENT TELEPHONE ENCOUNTER (OUTPATIENT)
Dept: CALL CENTER | Facility: HOSPITAL | Age: 62
End: 2022-10-17

## 2022-10-17 ENCOUNTER — TELEPHONE (OUTPATIENT)
Dept: FAMILY MEDICINE CLINIC | Facility: CLINIC | Age: 62
End: 2022-10-17

## 2022-10-17 VITALS
RESPIRATION RATE: 18 BRPM | OXYGEN SATURATION: 99 % | BODY MASS INDEX: 29.99 KG/M2 | HEIGHT: 71 IN | DIASTOLIC BLOOD PRESSURE: 68 MMHG | WEIGHT: 214.2 LBS | SYSTOLIC BLOOD PRESSURE: 124 MMHG | TEMPERATURE: 96.9 F | HEART RATE: 90 BPM

## 2022-10-17 DIAGNOSIS — I10 PRIMARY HYPERTENSION: Chronic | ICD-10-CM

## 2022-10-17 DIAGNOSIS — R09.89 SUSPECTED CEREBROVASCULAR ACCIDENT (CVA): ICD-10-CM

## 2022-10-17 DIAGNOSIS — E78.2 MIXED HYPERLIPIDEMIA: Chronic | ICD-10-CM

## 2022-10-17 DIAGNOSIS — Z79.01 ANTICOAGULATED: ICD-10-CM

## 2022-10-17 DIAGNOSIS — Z79.4 TYPE 2 DIABETES MELLITUS WITH HYPERGLYCEMIA, WITH LONG-TERM CURRENT USE OF INSULIN: ICD-10-CM

## 2022-10-17 DIAGNOSIS — E11.65 TYPE 2 DIABETES MELLITUS WITH HYPERGLYCEMIA, WITH LONG-TERM CURRENT USE OF INSULIN: ICD-10-CM

## 2022-10-17 DIAGNOSIS — E11.65 UNCONTROLLED TYPE 2 DIABETES MELLITUS WITH HYPERGLYCEMIA: ICD-10-CM

## 2022-10-17 DIAGNOSIS — R20.0 LEFT SIDED NUMBNESS: ICD-10-CM

## 2022-10-17 PROBLEM — IMO0002 DIABETES TYPE 2, UNCONTROLLED: Status: RESOLVED | Noted: 2018-07-13 | Resolved: 2022-10-17

## 2022-10-17 PROCEDURE — 99496 TRANSJ CARE MGMT HIGH F2F 7D: CPT | Performed by: FAMILY MEDICINE

## 2022-10-17 RX ORDER — DULAGLUTIDE 4.5 MG/.5ML
4.5 INJECTION, SOLUTION SUBCUTANEOUS WEEKLY
Qty: 2 ML | Refills: 4 | Status: SHIPPED | OUTPATIENT
Start: 2022-10-17 | End: 2022-10-17

## 2022-10-17 RX ORDER — DULAGLUTIDE 3 MG/.5ML
3 INJECTION, SOLUTION SUBCUTANEOUS
COMMUNITY
End: 2022-10-17 | Stop reason: DRUGHIGH

## 2022-10-17 RX ORDER — ATORVASTATIN CALCIUM 20 MG/1
20 TABLET, FILM COATED ORAL DAILY
Qty: 90 TABLET | Refills: 1 | Status: SHIPPED | OUTPATIENT
Start: 2022-10-17

## 2022-10-17 RX ORDER — DULAGLUTIDE 4.5 MG/.5ML
4.5 INJECTION, SOLUTION SUBCUTANEOUS WEEKLY
Qty: 12 ML | Refills: 3 | Status: SHIPPED | OUTPATIENT
Start: 2022-10-17

## 2022-10-17 NOTE — PAYOR COMM NOTE
"FROM: AMARA OROZCO  PHONE: [phone]  FAX: [phone]    [facility] [id number]  [address] [place] [address]    ADMIT DATE 10/14/2022 INPATIENT  DIAGNOSIS: I63.9    DISCHARGE: 10/15/2022--HOME     DR. SHAUN JOLLEY [id number]      Rafa MACE (62 y.o. Male)     Date of Birth   1960    Social Security Number       Address   [address] [place] [facility] [facility] [address]    Home Phone   [phone]    MRN   [id number]       Rastafarian   Jain    Marital Status                               Admission Date   10/14/22    Admission Type   Emergency    Admitting Provider       Attending Provider       Department, Room/Bed   [facility] 3A, 350/1       Discharge Date   10/15/2022    Discharge Disposition   Home or Self Care    Discharge Destination                               Attending Provider: (none)   Allergies: No Known Allergies    Isolation: None   Infection: None   Code Status: Prior    Ht: 180.3 cm (71\")   Wt: 98 kg (216 lb)    Admission Cmt: None   Principal Problem: Left sided numbness [R20.0]                 Active Insurance as of 10/14/2022     Primary Coverage     Payor Plan Insurance Group Employer/Plan Group    HE[employer] HE[employer] [id number]     Payor Plan Address Payor Plan Phone Number Payor Plan Fax Number Effective Dates    [address] [phone]  7/1/2021 - None Entered    [place] [address]       Subscriber Name Subscriber Birth Date Member ID       RAFA MACE 1960 [id number]                 Emergency Contacts      (Rel.) Home Phone Work Phone Mobile Phone    JasonArlet (Spouse) [phone] -- --               History & Physical      Shaun Jolley MD at 10/14/22 1812              [facility] Medicine Services  HISTORY AND PHYSICAL    Date of Admission: 10/14/2022  Primary Care Physician: Davi Gandhi MD    Subjective     Chief Complaint: Left sided numbness " "and weakness    History of Present Illness  Patient is a 62-year-old  male with past medical history significant for insulin-dependent diabetes, hypertension, gastroesophageal reflux disease the presented to our hospital given concern for strokelike symptoms.  Patient reports that he was out at a local pumpkin patch with his grandchildren, and reports that he was trying to gather one of his grandkids back to their car as it was time to leave.  He states that he had onset of left-sided numbness.  He describes numbness that was on the left side of his face and extending all the way down his left upper extremity and left lower extremity.  He reports that he was able to walk, but states \"I really had to think about it.\"  He denied any speech difficulty.  He denied any headache.  Denied any vision change.  He reported that his left side did feel a little bit heavy and weak.  He feels like that his symptoms have improved since being in the emergency department, but he also states that he is not back to normal.    Neurology has already evaluated the patient in the emergency department.  I discussion did take place regarding consideration of tPA-this mode of therapy was not pursued.    Patient does report that he takes a baby aspirin nightly on an outpatient basis.  He does not smoke.  He denies any recent medication adjustments.  Denies any chest pain or chest pressure.  Denies any shortness of breath.    Review of Systems     Otherwise complete ROS reviewed and negative except as mentioned in the HPI.    Past Medical History:   Past Medical History:   Diagnosis Date   • Cerebrovascular accident (CVA), unspecified mechanism (HCC) 10/14/2022   • Diabetes mellitus (HCC)    • Hypertension    • Premature ventricular beats      Past Surgical History:  Past Surgical History:   Procedure Laterality Date   • APPENDECTOMY     • COLONOSCOPY  10/22/2013   • COLONOSCOPY N/A 9/19/2022    Procedure: COLONOSCOPY WITH ANESTHESIA; "  Surgeon: Rian Nobles MD;  Location:  [facility];  Service: Gastroenterology;  Laterality: N/A;  preop; hx of polyps  postop; hemmhroids   PCP Davi Gandhi    • ENDOSCOPY N/A 9/19/2022    Procedure: ESOPHAGOGASTRODUODENOSCOPY WITH ANESTHESIA;  Surgeon: Rian Nobles MD;  Location:  [facility];  Service: Gastroenterology;  Laterality: N/A;  preop; dysphagia  postop; r/o barretts; esophagitis   PCP Davi Gandhi      Social History:  reports that he has never smoked. He has never used smokeless tobacco. He reports that he does not drink alcohol and does not use drugs.    Family History: family history includes Colon cancer in his brother; Diabetes in his mother; Heart disease in his father; Stroke in his mother.       Allergies:  No Known Allergies    Medications:  Prior to Admission medications    Medication Sig Start Date End Date Taking? Authorizing Provider   Insulin Glargine (BASAGLAR KWIKPEN) 100 UNIT/ML injection pen Inject 50 Units under the skin into the appropriate area as directed Daily for 90 days. 10/14/22 1/12/23  Zack Copeland APRN   aspirin 81 MG EC tablet Take 81 mg by mouth Daily.    Provider, MD Oleksandr   colesevelam (WELCHOL) 625 MG tablet TAKE 3 TABLETS BY MOUTH TWICE DAILY WITH MEALS 1/12/22   Zack Copeland APRN   Dulaglutide 3 MG/0.5ML solution pen-injector Inject 3 mg under the skin into the appropriate area as directed 1 (One) Time Per Week. 12/28/20   Davi Gandhi MD   insulin aspart (NovoLOG FlexPen) 100 UNIT/ML solution pen-injector sc pen Inject 7 Units under the skin into the appropriate area as directed 3 (Three) Times a Day With Meals. 9/21/21   Davi Gandhi MD   Insulin Pen Needle (B-D UF III MINI PEN NEEDLES) 31G X 5 MM misc 1 Device 4 (Four) Times a Day. 9/21/21   Davi Gandhi MD   Jardiance 25 MG tablet tablet TAKE 1 TABLET DAILY 9/12/22   Davi Gandhi MD   lisinopril (PRINIVIL,ZESTRIL) 10 MG tablet TAKE 1 TABLET BY  "MOUTH DAILY 8/2/22   Davi Gandhi MD   metFORMIN (GLUCOPHAGE) 500 MG tablet Take 2 tablets by mouth 2 (Two) Times a Day With Meals. 8/5/21   Davi Gandhi MD   pantoprazole (PROTONIX) 40 MG EC tablet Take 1 tablet by mouth Daily. 9/19/22   Rian Nobles MD   tadalafil (CIALIS) 20 MG tablet Take 20 mg by mouth Daily As Needed for erectile dysfunction.    Provider, MD Oleksandr   Insulin Glargine (BASAGLAR KWIKPEN) 100 UNIT/ML injection pen INJECT 50 UNITS            SUBCUTANEOUSLY INTO        APPROPRIATE AREA DAILY AS  DIRECTED 9/12/22 10/14/22  Davi Gandhi MD     I have utilized all available immediate resources to obtain, update, and review the patient's current medications.    Objective     Vital Signs: /85   Pulse 75   Temp 98.2 °F (36.8 °C)   Resp 14   Ht 180.3 cm (71\")   Wt 98 kg (216 lb)   SpO2 98%   BMI 30.13 kg/m²   Physical Exam  Vitals reviewed.   Constitutional:       General: He is not in acute distress.     Appearance: He is not toxic-appearing.   HENT:      Head: Normocephalic.      Mouth/Throat:      Mouth: Mucous membranes are moist.   Eyes:      Pupils: Pupils are equal, round, and reactive to light.   Cardiovascular:      Rate and Rhythm: Normal rate and regular rhythm.   Pulmonary:      Effort: Pulmonary effort is normal. No respiratory distress.      Breath sounds: No wheezing or rales.   Abdominal:      Palpations: Abdomen is soft.   Musculoskeletal:         General: No swelling.   Skin:     General: Skin is warm.      Capillary Refill: Capillary refill takes less than 2 seconds.   Neurological:      Mental Status: He is alert and oriented to person, place, and time.      Cranial Nerves: No cranial nerve deficit.      Comments: Face symmetric; speech fluent.  ? atypical findings on motor assessment of left upper extremity - sputtering symptoms   Psychiatric:         Mood and Affect: Mood normal.          Results Reviewed:  Lab Results (last 24 " hours)     Procedure Component Value Units Date/Time    Comprehensive Metabolic Panel [[id number]]  (Abnormal) Collected: 10/14/22 1719    Specimen: Blood Updated: 10/14/22 1754     Glucose 206 mg/dL      BUN 18 mg/dL      Creatinine 1.19 mg/dL      Sodium 136 mmol/L      Potassium 4.6 mmol/L      Comment: Slight hemolysis detected by analyzer. Results may be affected.        Chloride 100 mmol/L      CO2 22.0 mmol/L      Calcium 9.6 mg/dL      Total Protein 7.6 g/dL      Albumin 4.50 g/dL      ALT (SGPT) 22 U/L      AST (SGOT) 26 U/L      Comment: Slight hemolysis detected by analyzer. Results may be affected.        Alkaline Phosphatase 109 U/L      Total Bilirubin 0.3 mg/dL      Globulin 3.1 gm/dL      A/G Ratio 1.5 g/dL      BUN/Creatinine Ratio 15.1     Anion Gap 14.0 mmol/L      eGFR 69.9 mL/min/1.73      Comment: National Kidney Foundation and American Society of Nephrology (ASN) Task Force recommended calculation based on the Chronic Kidney Disease Epidemiology Collaboration (CKD-EPI) equation refit without adjustment for race.       Narrative:      GFR Normal >60  Chronic Kidney Disease <60  Kidney Failure <15      Troponin [[id number]]  (Normal) Collected: 10/14/22 1719    Specimen: Blood Updated: 10/14/22 1752     Troponin T <0.010 ng/mL     Narrative:      Troponin T Reference Range:  <= 0.03 ng/mL-   Negative for AMI  >0.03 ng/mL-     Abnormal for myocardial necrosis.  Clinicians would have to utilize clinical acumen, EKG, Troponin and serial changes to determine if it is an Acute Myocardial Infarction or myocardial injury due to an underlying chronic condition.       Results may be falsely decreased if patient taking Biotin.      Protime-INR [[id number]]  (Normal) Collected: 10/14/22 1719    Specimen: Blood Updated: 10/14/22 1745     Protime 12.4 Seconds      INR 0.96    aPTT [[id number]]  (Normal) Collected: 10/14/22 1719    Specimen: Blood Updated: 10/14/22 1745     PTT 31.0 seconds     POC Glucose Once  [[id number]]  (Abnormal) Collected: 10/14/22 1724    Specimen: Blood Updated: 10/14/22 1737     Glucose 200 mg/dL      Comment: : [id number] Samir Ashrafer ID: [id number]       CBC & Differential [[id number]]  (Normal) Collected: 10/14/22 1719    Specimen: Blood Updated: 10/14/22 1734    Narrative:      The following orders were created for panel order CBC & Differential.  Procedure                               Abnormality         Status                     ---------                               -----------         ------                     CBC Auto Differential[[id number]]        Normal              Final result                 Please view results for these tests on the individual orders.    CBC Auto Differential [[id number]]  (Normal) Collected: 10/14/22 1719    Specimen: Blood Updated: 10/14/22 1734     WBC 7.74 10*3/mm3      RBC 5.50 10*6/mm3      Hemoglobin 16.9 g/dL      Hematocrit 47.5 %      MCV 86.4 fL      MCH 30.7 pg      MCHC 35.6 g/dL      RDW 12.8 %      RDW-SD 40.1 fl      MPV 9.8 fL      Platelets 182 10*3/mm3      Neutrophil % 53.3 %      Lymphocyte % 30.9 %      Monocyte % 10.1 %      Eosinophil % 5.0 %      Basophil % 0.4 %      Immature Grans % 0.3 %      Neutrophils, Absolute 4.13 10*3/mm3      Lymphocytes, Absolute 2.39 10*3/mm3      Monocytes, Absolute 0.78 10*3/mm3      Eosinophils, Absolute 0.39 10*3/mm3      Basophils, Absolute 0.03 10*3/mm3      Immature Grans, Absolute 0.02 10*3/mm3      nRBC 0.0 /100 WBC     Lavender Top [[id number]] Collected: 10/14/22 1719    Specimen: Blood Updated: 10/14/22 1730    Light Blue Top [[id number]] Collected: 10/14/22 1719    Specimen: Blood Updated: 10/14/22 1730    Red Top [[id number]] Collected: 10/14/22 1719    Specimen: Blood Updated: 10/14/22 1730    [place] Draw [[id number]] Collected: 10/14/22 1719    Specimen: Blood Updated: 10/14/22 1730    Narrative:      The following orders were created for panel order [place] Draw.  Procedure                                Abnormality         Status                     ---------                               -----------         ------                     Green Top (Gel)[[id number]]                                  In process                 Lavender Top[[id number]]                                     In process                 Red Top[[id number]]                                          In process                 Light Blue Top[[id number]]                                   In process                   Please view results for these tests on the individual orders.    Green Top (Gel) [[id number]] Collected: 10/14/22 1719    Specimen: Blood Updated: 10/14/22 1730        Imaging Results (Last 24 Hours)     Procedure Component Value Units Date/Time    CT Angiogram Head w AI Analysis of LVO [[id number]] Collected: 10/14/22 1746     Updated: 10/14/22 1752    Narrative:      CT ANGIOGRAM HEAD W AI ANALYSIS OF LVO- 10/14/2022 5:18 PM CDT     HISTORY: Stroke, follow up; left-sided weakness     COMPARISON: None     DOSE LENGTH PRODUCT: 196 mGy cm. Automated exposure control was also  utilized to decrease patient radiation dose.     TECHNIQUE: Axial images of brain are obtained following IV contrast. 2-D  and maximal intensity projection images are reviewed. AI analysis of LVO  was utilized.        FINDINGS:  Minimal calcification considered within the cavernous  segments of the distal internal carotid arteries creating less than 25%  stenosis. 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  Bilateral anterior middle cerebral arteries are patent and. Hypoplastic  right vertebral artery with the basilar artery supplied by the left  vertebral artery. Mild calcified plaque of the distal left vertebral  artery creating less than 50% stenosis. There is artery slightly small  in caliber with no focal high-grade stenosis. Bilateral posterior  cerebral arteries are patent. No large vessel occlusion. No aneurysm or  dissection.  No abnormal intracranial enhancement.       Impression:      1. No large vessel occlusion. No aneurysm or dissection.  This report was finalized on 10/14/2022 17:49 by Dr. Letitia Cornejo MD.    XR Chest 1 View [[id number]] Resulted: 10/14/22 1744     Updated: 10/14/22 1751    CT Angiogram Neck [[id number]] Collected: 10/14/22 1742     Updated: 10/14/22 1748    Narrative:      CT ANGIOGRAM NECK- 10/14/2022 5:18 PM CDT     HISTORY: Stroke, follow up; left-sided weakness     COMPARISON: None     DOSE LENGTH PRODUCT: 196 mGy cm. Automated exposure control was also  utilized to decrease patient radiation dose.     TECHNIQUE: Axial images the neck are obtained following IV contrast. 2-D  and maximal intensity projection images are reviewed.     FINDINGS:  Mild calcification of the arch of the thoracic aorta. Bovine  variant to the arch with a common origin the right brachiocephalic and  left common carotid arteries. Right brachiocephalic artery is patent. No  subclavian artery stenosis identified. The bilateral common, internal,  and external carotid arteries are widely patent. Vertebral arteries  originate from the proximal subclavian arteries as expected. Prominent  hypoplasia right vertebral artery with dominant left vertebral artery.  Mild calcified plaque of the distal left vertebral artery creating less  than 30% stenosis. Basilar artery is supplied by the left vertebral  artery. No focal high-grade stenosis. No aneurysm or dissection.     Mosaic appearance to the visible upper lobe parenchyma may be seen with  air trapping. No apical pneumothorax. No pathologic lymphadenopathy or  soft tissue mass identified within the visible plaque. Slight  exaggerated lordosis of the cervical spine.       Impression:      1. Widely patent bilateral extracranial carotid arteries. 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 Basilar artery supplied by the  left vertebral artery. No focal high-grade stenosis. No aneurysm  or  dissection.  This report was finalized on 10/14/2022 17:45 by Dr. Letitia Cornejo MD.    CT Head Without Contrast Stroke Protocol [[id number]] Collected: 10/14/22 1723     Updated: 10/14/22 1729    Narrative:      CT HEAD WO CONTRAST STROKE PROTOCOL- 10/14/2022 5:17 PM CDT     HISTORY: Neuro deficit, acute, stroke suspected, left-sided weakness  with numbness     COMPARISON: None     DOSE LENGTH PRODUCT: 696 mGy cm. Automated exposure control was also  utilized to decrease patient radiation dose.     TECHNIQUE: Axial images of the brain are obtained without IV contrast.     FINDINGS:  The ventricles are normal in size and configuration. There is  no intracranial hemorrhage or mass effect. There are no acute signs of  ischemia based on CT imaging. No extra-axial hematoma or subarachnoid  hemorrhage.     No air-fluid levels within the visible paranasal sinuses. Mastoid air  cells well-aerated. The bony calvarium appears intact.       Impression:      1. No acute intracranial abnormality identified. No intracranial  hemorrhage. No acute signs of ischemia based on CT exam.     Comments: Findings called to Magda in the ER at 5:26 PM on 10/14/2022  This report was finalized on 10/14/2022 17:26 by Dr. Letitia Cornejo MD.        I have personally reviewed and interpreted the radiology studies and ECG obtained at time of admission.     Assessment / Plan     Assessment:   Active Hospital Problems    Diagnosis    • **Cerebrovascular accident (CVA), unspecified mechanism (HCC)    • Type 2 diabetes mellitus with hyperglycemia (HCC)    • Gastroesophageal reflux disease      Added automatically from request for surgery [id number]     • Hyperlipidemia-[facility]    • Hypertension      Plan:   1.  MRI Brain  2.  Echo with bubble  3.  Telemetry monitoring  4.  Neuro has already evaluated patient; appreciate their assistance  5.  ASA/Plavix; trial of statin  6.  Check lipids and A1c  7.  Levemir 30 units QHS; insulin lispro 7 units  with meals  8.  Allow for permissive HTN in the short term  9.  PT/OT/ST  10. SCDs  11.  Workup ongoing      Electronically signed by Shaun BROWN. MD [place], 10/14/22, 18:17 CDT.              Electronically signed by Shaun Cortez MD at 10/14/22 1819          Emergency Department Notes      Katy Velasquez MD at 10/14/22 1716          Subjective   History of Present Illness  Patient is a 62-year-old male who presents to the ER with strokelike symptoms.  Patient states that 1 hour ago he had the sudden onset of left-sided numbness.  Patient states his arm face and leg are all involved.  Patient states his symptoms have persisted so he came here.  Patient states he is also a little weak to his left arm and leg.  He denies any recent trauma.  He denies any right-sided symptoms.  He denies any fever, chest pain, shortness of air, abdominal pain,  nausea vomiting diarrhea, urinary changes.        Review of Systems   HENT: Negative.    Eyes: Negative.    Respiratory: Negative.    Cardiovascular: Negative.    Gastrointestinal: Negative.    Endocrine: Negative.    Genitourinary: Negative.    Musculoskeletal: Negative.    Skin: Negative.    Allergic/Immunologic: Negative.    Neurological: Positive for weakness and numbness.   Hematological: Negative.    Psychiatric/Behavioral: Negative.    All other systems reviewed and are negative.      Past Medical History:   Diagnosis Date   • Cerebrovascular accident (CVA), unspecified mechanism (HCC) 10/14/2022   • Diabetes mellitus (HCC)    • Hypertension    • Premature ventricular beats        No Known Allergies    Past Surgical History:   Procedure Laterality Date   • APPENDECTOMY     • COLONOSCOPY  10/22/2013   • COLONOSCOPY N/A 9/19/2022    Procedure: COLONOSCOPY WITH ANESTHESIA;  Surgeon: Rian Nobles MD;  Location: [facility] ENDOSCOPY;  Service: Gastroenterology;  Laterality: N/A;  preop; hx of polyps  postop; hemmhroids   PCP Davi Gandhi    • ENDOSCOPY N/A  9/19/2022    Procedure: ESOPHAGOGASTRODUODENOSCOPY WITH ANESTHESIA;  Surgeon: Rian Nobles MD;  [facility] END[facility]COPY;  Service: Gastroenterology;  Laterality: N/A;  preop; dysphagia  postop; r/o barretts; esophagitis   PCP Davi Gandhi        Family History   Problem Relation Age of Onset   • Diabetes Mother    • Stroke Mother    • Heart disease Father    • Colon cancer Brother        Social History     Socioeconomic History   • Marital status:      Spouse name: Ada   • Number of children: 3   • Years of education: 14   Tobacco Use   • Smoking status: Never   • Smokeless tobacco: Never   Vaping Use   • Vaping Use: Never used   Substance and Sexual Activity   • Alcohol use: No   • Drug use: No   • Sexual activity: Defer           Objective   Physical Exam  Vitals and nursing note reviewed.   Constitutional:       Appearance: He is well-developed.   HENT:      Head: Normocephalic and atraumatic.   Eyes:      Conjunctiva/sclera: Conjunctivae normal.      Pupils: Pupils are equal, round, and reactive to light.   Cardiovascular:      Rate and Rhythm: Normal rate and regular rhythm.      Heart sounds: Normal heart sounds.   Pulmonary:      Effort: Pulmonary effort is normal.      Breath sounds: Normal breath sounds.   Abdominal:      Palpations: Abdomen is soft.      Tenderness: There is no abdominal tenderness.   Musculoskeletal:         General: No deformity. Normal range of motion.      Cervical back: Normal range of motion.   Skin:     General: Skin is warm.   Neurological:      Mental Status: He is alert and oriented to person, place, and time.      Cranial Nerves: Cranial nerves 2-12 are intact.      Coordination: Coordination is intact.      Comments: Decreased sensation to the left face, arm, leg.  Decreased strength to the left leg and arm.   Psychiatric:         Behavior: Behavior normal.         Procedures          ED Course          A code stroke was called.  Rob Simmons with neurology  evaluated the patient.  Patient is not a tPA candidate due to low NIH stroke scale.    EKG: Sinus rhythm with a rate of 88 with PVCs, no acute ischemia     NIH Stroke Scale/Score (NIHSS) - MDCalc  Calculated on Oct 14 2022 7:03 PM  1 points -> NIH Stroke Scale     Lab Results (last 24 hours)     Procedure Component Value Units Date/Time    CBC & Differential [[id number]]  (Normal) Collected: 10/14/22 1719    Specimen: Blood Updated: 10/14/22 1734    Narrative:      The following orders were created for panel order CBC & Differential.  Procedure                               Abnormality         Status                     ---------                               -----------         ------                     CBC Auto Differential[[id number]]        Normal              Final result                 Please view results for these tests on the individual orders.    Comprehensive Metabolic Panel [[id number]]  (Abnormal) Collected: 10/14/22 1719    Specimen: Blood Updated: 10/14/22 1754     Glucose 206 mg/dL      BUN 18 mg/dL      Creatinine 1.19 mg/dL      Sodium 136 mmol/L      Potassium 4.6 mmol/L      Comment: Slight hemolysis detected by analyzer. Results may be affected.        Chloride 100 mmol/L      CO2 22.0 mmol/L      Calcium 9.6 mg/dL      Total Protein 7.6 g/dL      Albumin 4.50 g/dL      ALT (SGPT) 22 U/L      AST (SGOT) 26 U/L      Comment: Slight hemolysis detected by analyzer. Results may be affected.        Alkaline Phosphatase 109 U/L      Total Bilirubin 0.3 mg/dL      Globulin 3.1 gm/dL      A/G Ratio 1.5 g/dL      BUN/Creatinine Ratio 15.1     Anion Gap 14.0 mmol/L      eGFR 69.9 mL/min/1.73      Comment: National Kidney Foundation and American Society of Nephrology (ASN) Task Force recommended calculation based on the Chronic Kidney Disease Epidemiology Collaboration (CKD-EPI) equation refit without adjustment for race.       Narrative:      GFR Normal >60  Chronic Kidney Disease <60  Kidney Failure <15       Protime-INR [[id number]]  (Normal) Collected: 10/14/22 1719    Specimen: Blood Updated: 10/14/22 1745     Protime 12.4 Seconds      INR 0.96    aPTT [[id number]]  (Normal) Collected: 10/14/22 1719    Specimen: Blood Updated: 10/14/22 1745     PTT 31.0 seconds     Troponin [[id number]]  (Normal) Collected: 10/14/22 1719    Specimen: Blood Updated: 10/14/22 1752     Troponin T <0.010 ng/mL     Narrative:      Troponin T Reference Range:  <= 0.03 ng/mL-   Negative for AMI  >0.03 ng/mL-     Abnormal for myocardial necrosis.  Clinicians would have to utilize clinical acumen, EKG, Troponin and serial changes to determine if it is an Acute Myocardial Infarction or myocardial injury due to an underlying chronic condition.       Results may be falsely decreased if patient taking Biotin.      CBC Auto Differential [[id number]]  (Normal) Collected: 10/14/22 1719    Specimen: Blood Updated: 10/14/22 1734     WBC 7.74 10*3/mm3      RBC 5.50 10*6/mm3      Hemoglobin 16.9 g/dL      Hematocrit 47.5 %      MCV 86.4 fL      MCH 30.7 pg      MCHC 35.6 g/dL      RDW 12.8 %      RDW-SD 40.1 fl      MPV 9.8 fL      Platelets 182 10*3/mm3      Neutrophil % 53.3 %      Lymphocyte % 30.9 %      Monocyte % 10.1 %      Eosinophil % 5.0 %      Basophil % 0.4 %      Immature Grans % 0.3 %      Neutrophils, Absolute 4.13 10*3/mm3      Lymphocytes, Absolute 2.39 10*3/mm3      Monocytes, Absolute 0.78 10*3/mm3      Eosinophils, Absolute 0.39 10*3/mm3      Basophils, Absolute 0.03 10*3/mm3      Immature Grans, Absolute 0.02 10*3/mm3      nRBC 0.0 /100 WBC     POC Glucose Once [[id number]]  (Abnormal) Collected: 10/14/22 1724    Specimen: Blood Updated: 10/14/22 1737     Glucose 200 mg/dL      Comment: : [id number] Samir ElaineMeter ID: [id number]           CT Angiogram Head w AI Analysis of LVO   Final Result   1. No large vessel occlusion. No aneurysm or dissection.   This report was finalized on 10/14/2022 17:49 by Dr. Letitia Cornejo MD.       CT Angiogram Neck   Final Result   1. Widely patent bilateral extracranial carotid arteries. 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 Basilar artery supplied by the   left vertebral artery. No focal high-grade stenosis. No aneurysm or   dissection.   This report was finalized on 10/14/2022 17:45 by Dr. Letitia Cornejo MD.      CT Head Without Contrast Stroke Protocol   Final Result   1. No acute intracranial abnormality identified. No intracranial   hemorrhage. No acute signs of ischemia based on CT exam.       Comments: Findings called to Magda in the ER at 5:26 PM on 10/14/2022   This report was finalized on 10/14/2022 17:26 by Dr. Letitia Cornejo MD.      XR Chest 1 View    (Results Pending)   MRI Brain Without Contrast    (Results Pending)                    Labs showed hyperglycemia.  Otherwise negative.  CT scan of the head showed no acute findings.  CTA of head and neck showed no large vessel occlusions.  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  There is no high-grade stenosis.  There is no dissection or aneurysm.  Dr. Chisholm with neurology has also evaluated the patient.  Patient was then admitted to the hospitalist service for further treatment and work-up.          [facility]    Final diagnoses:   Cerebrovascular accident (CVA), unspecified mechanism (HCC)       ED Disposition  ED Disposition     ED Disposition   Decision to Admit    Condition   --    Comment   Level of Care: Telemetry [5]   Diagnosis: Cerebrovascular accident (CVA), unspecified mechanism (HCC) [[id number]]   Admitting Physician: ALLEGRA JOHNSON [[id number]]   Attending Physician: ALLEGRA JOHNSON [[id number]]   Certification: I Certify That Inpatient Hospital Services Are Medically Necessary For Greater Than 2 Midnights               No follow-up provider specified.       Medication List      No changes were made to your prescriptions during this visit.          Katy Velasquez MD  10/14/22 2186        Katy Velasquez MD  10/14/22 1812       Katy Velasquez MD  10/14/22 2040      Electronically signed by Katy Velasquez MD at 10/14/22 2040       Vital Signs (last 3 days) before discharge     Date/Time Temp Temp src Pulse Resp BP Patient Position SpO2    10/15/22 1100 97.4 (36.3) Oral 90 18 117/71 Sitting 97    10/15/22 1055 -- -- -- -- 164/85 -- --    10/15/22 0330 97.6 (36.4) Oral 80 18 97/64 Lying 96    10/14/22 2311 97.6 (36.4) Oral 81 16 110/77 Lying 97    10/14/22 2034 98 (36.7) Oral 74 14 135/72 Lying 96    10/14/22 1846 -- -- 82 16 137/69 Lying 96    10/14/22 1709 98.2 (36.8) -- 75 14 169/85 -- 98            Facility-Administered Medications as of 10/15/2022   Medication Dose Route Frequency Provider Last Rate Last Admin   • [COMPLETED] iopamidol (ISOVUE-370) 76 % injection 125 mL  125 mL Intravenous Once in imaging Katy Velasquez MD   125 mL at 10/14/22 1728   • [COMPLETED] perflutren (DEFINITY) lipid microspheres injection 8.476 mg  1.3 mL Intravenous Once Shaun Cortez MD   8.476 mg at 10/15/22 1052     Lab Results (last 72 hours)     Procedure Component Value Units Date/Time    POC Glucose Once [[id number]]  (Abnormal) Collected: 10/15/22 1305    Specimen: Blood Updated: 10/15/22 1317     Glucose 239 mg/dL      Comment: : [id number] CMP TherapeuticsyMeter ID: [id number]       POC Glucose Once [[id number]]  (Normal) Collected: 10/15/22 0800    Specimen: Blood Updated: 10/15/22 0812     Glucose 94 mg/dL      Comment: : [id number] CMP TherapeuticsyMeter ID: [id number]       Hemoglobin A1c [[id number]]  (Abnormal) Collected: 10/15/22 0413    Specimen: Blood Updated: 10/15/22 0447     Hemoglobin A1C 8.70 %     Narrative:      Hemoglobin A1C Ranges:    Increased Risk for Diabetes  5.7% to 6.4%  Diabetes                     >= 6.5%  Diabetic Goal                < 7.0%    Lipid Panel [[id number]]  (Abnormal) Collected: 10/15/22 0413    Specimen: Blood Updated: 10/15/22 0447     Total  Cholesterol 126 mg/dL      Triglycerides 85 mg/dL      HDL Cholesterol 33 mg/dL      LDL Cholesterol  76 mg/dL      VLDL Cholesterol 17 mg/dL      LDL/HDL Ratio 2.30    Narrative:      Cholesterol Reference Ranges  (U.S. Department of Health and Human Services ATP III Classifications)    Desirable          <200 mg/dL  Borderline High    200-239 mg/dL  High Risk          >240 mg/dL      Triglyceride Reference Ranges  (U.S. Department of Health and Human Services ATP III Classifications)    Normal           <150 mg/dL  Borderline High  150-199 mg/dL  High             200-499 mg/dL  Very High        >500 mg/dL    HDL Reference Ranges  (U.S. Department of Health and Human Services ATP III Classifications)    Low     <40 mg/dl (major risk factor for CHD)  High    >60 mg/dl ('negative' risk factor for CHD)        LDL Reference Ranges  (U.S. Department of Health and Human Services ATP III Classifications)    Optimal          <100 mg/dL  Near Optimal     100-129 mg/dL  Borderline High  130-159 mg/dL  High             160-189 mg/dL  Very High        >189 mg/dL    POC Glucose Once [[id number]]  (Abnormal) Collected: 10/14/22 2238    Specimen: Blood Updated: 10/14/22 2249     Glucose 154 mg/dL      Comment: : rowena RamirezhanMeter ID: [id number]       Rosman Draw [[id number]] Collected: 10/14/22 1719    Specimen: Blood Updated: 10/14/22 1831    Narrative:      The following orders were created for panel order Rosman Draw.  Procedure                               Abnormality         Status                     ---------                               -----------         ------                     Green Top (Gel)[[id number]]                                  Final result               Lavender Top[[id number]]                                     Final result               Red Top[[id number]]                                          Final result               Light Blue Top[[id number]]                                    Final result                 Please view results for these tests on the individual orders.    Lavender Top [[id number]] Collected: 10/14/22 1719    Specimen: Blood Updated: 10/14/22 1831     Extra Tube hold for add-on     Comment: Auto resulted       Green Top (Gel) [[id number]] Collected: 10/14/22 1719    Specimen: Blood Updated: 10/14/22 1831     Extra Tube Hold for add-ons.     Comment: Auto resulted.       Red Top [[id number]] Collected: 10/14/22 1719    Specimen: Blood Updated: 10/14/22 1831     Extra Tube Hold for add-ons.     Comment: Auto resulted.       Light Blue Top [[id number]] Collected: 10/14/22 1719    Specimen: Blood Updated: 10/14/22 1831     Extra Tube Hold for add-ons.     Comment: Auto resulted       Comprehensive Metabolic Panel [[id number]]  (Abnormal) Collected: 10/14/22 1719    Specimen: Blood Updated: 10/14/22 1754     Glucose 206 mg/dL      BUN 18 mg/dL      Creatinine 1.19 mg/dL      Sodium 136 mmol/L      Potassium 4.6 mmol/L      Comment: Slight hemolysis detected by analyzer. Results may be affected.        Chloride 100 mmol/L      CO2 22.0 mmol/L      Calcium 9.6 mg/dL      Total Protein 7.6 g/dL      Albumin 4.50 g/dL      ALT (SGPT) 22 U/L      AST (SGOT) 26 U/L      Comment: Slight hemolysis detected by analyzer. Results may be affected.        Alkaline Phosphatase 109 U/L      Total Bilirubin 0.3 mg/dL      Globulin 3.1 gm/dL      A/G Ratio 1.5 g/dL      BUN/Creatinine Ratio 15.1     Anion Gap 14.0 mmol/L      eGFR 69.9 mL/min/1.73      Comment: National Kidney Foundation and American Society of Nephrology (ASN) Task Force recommended calculation based on the Chronic Kidney Disease Epidemiology Collaboration (CKD-EPI) equation refit without adjustment for race.       Narrative:      GFR Normal >60  Chronic Kidney Disease <60  Kidney Failure <15      Troponin [[id number]]  (Normal) Collected: 10/14/22 1719    Specimen: Blood Updated: 10/14/22 1752     Troponin T <0.010 ng/mL      Narrative:      Troponin T Reference Range:  <= 0.03 ng/mL-   Negative for AMI  >0.03 ng/mL-     Abnormal for myocardial necrosis.  Clinicians would have to utilize clinical acumen, EKG, Troponin and serial changes to determine if it is an Acute Myocardial Infarction or myocardial injury due to an underlying chronic condition.       Results may be falsely decreased if patient taking Biotin.      Protime-INR [[id number]]  (Normal) Collected: 10/14/22 1719    Specimen: Blood Updated: 10/14/22 1745     Protime 12.4 Seconds      INR 0.96    aPTT [[id number]]  (Normal) Collected: 10/14/22 1719    Specimen: Blood Updated: 10/14/22 1745     PTT 31.0 seconds     POC Glucose Once [[id number]]  (Abnormal) Collected: 10/14/22 1724    Specimen: Blood Updated: 10/14/22 1737     Glucose 200 mg/dL      Comment: : [id number] Samir AcevedobethMeter ID: [id number]       CBC & Differential [[id number]]  (Normal) Collected: 10/14/22 1719    Specimen: Blood Updated: 10/14/22 1734    Narrative:      The following orders were created for panel order CBC & Differential.  Procedure                               Abnormality         Status                     ---------                               -----------         ------                     CBC Auto Differential[[id number]]        Normal              Final result                 Please view results for these tests on the individual orders.    CBC Auto Differential [[id number]]  (Normal) Collected: 10/14/22 1719    Specimen: Blood Updated: 10/14/22 1734     WBC 7.74 10*3/mm3      RBC 5.50 10*6/mm3      Hemoglobin 16.9 g/dL      Hematocrit 47.5 %      MCV 86.4 fL      MCH 30.7 pg      MCHC 35.6 g/dL      RDW 12.8 %      RDW-SD 40.1 fl      MPV 9.8 fL      Platelets 182 10*3/mm3      Neutrophil % 53.3 %      Lymphocyte % 30.9 %      Monocyte % 10.1 %      Eosinophil % 5.0 %      Basophil % 0.4 %      Immature Grans % 0.3 %      Neutrophils, Absolute 4.13 10*3/mm3      Lymphocytes, Absolute 2.39  10*3/mm3      Monocytes, Absolute 0.78 10*3/mm3      Eosinophils, Absolute 0.39 10*3/mm3      Basophils, Absolute 0.03 10*3/mm3      Immature Grans, Absolute 0.02 10*3/mm3      nRBC 0.0 /100 WBC           Imaging Results (Last 72 Hours)     Procedure Component Value Units Date/Time    MRI Brain Without Contrast [[id number]] Collected: 10/15/22 1123     Updated: 10/15/22 1133    Narrative:      EXAMINATION:  MRI BRAIN WO CONTRAST-  10/15/2022 10:35 AM CDT     HISTORY: CODE STROKE. Left-sided weakness and numbness. Possible right  thalamic CVA; I63.9-Cerebral infarction, unspecified.     TECHNIQUE: Multiplanar imaging was performed in a high field magnet.     COMPARISON: No comparison study.     FINDINGS: No structural abnormalities are appreciated. The ventricular  system is nondilated. There are scattered areas of T2 high signal within  the hemispheric white matter. No mass lesions are identified. There are  no areas of signal abnormality on the diffusion weighted sequence.       Impression:      1. No evidence of acute infarct.  2. Scattered areas of T2 high signal within the hemispheric white matter  are nonspecific and likely due to chronic small vessel disease.     Results were called to aWleska, the charge nurse on 3A at 11:28 AM.        This report was finalized on 10/15/2022 11:29 by Dr. Kavin Salinas MD.    XR Chest 1 View [[id number]] Collected: 10/14/22 1816     Updated: 10/14/22 1820    Narrative:      HISTORY: Acute stroke protocol, left-sided weakness     CXR: Frontal view the chest obtained.     COMPARISON: None     FINDINGS: Density adjacent the left cardiac apex favorable for  epicardial fat. Lungs are free of consolidation, collapse, edema. Heart  is upper limits of normal in size. No pleural effusion or pneumothorax.  No acute regional bony pathology.       Impression:      1. No acute radiographic cardiopulmonary process.  This report was finalized on 10/14/2022 18:16 by Dr. Letitia Cornejo MD.    CT  Angiogram Head w AI Analysis of LVO [[id number]] Collected: 10/14/22 1746     Updated: 10/14/22 1752    Narrative:      CT ANGIOGRAM HEAD W AI ANALYSIS OF LVO- 10/14/2022 5:18 PM CDT     HISTORY: Stroke, follow up; left-sided weakness     COMPARISON: None     DOSE LENGTH PRODUCT: 196 mGy cm. Automated exposure control was also  utilized to decrease patient radiation dose.     TECHNIQUE: Axial images of brain are obtained following IV contrast. 2-D  and maximal intensity projection images are reviewed. AI analysis of LVO  was utilized.        FINDINGS:  Minimal calcification considered within the cavernous  segments of the distal internal carotid arteries creating less than 25%  stenosis. 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  Bilateral anterior middle cerebral arteries are patent and. Hypoplastic  right vertebral artery with the basilar artery supplied by the left  vertebral artery. Mild calcified plaque of the distal left vertebral  artery creating less than 50% stenosis. There is artery slightly small  in caliber with no focal high-grade stenosis. Bilateral posterior  cerebral arteries are patent. No large vessel occlusion. No aneurysm or  dissection. No abnormal intracranial enhancement.       Impression:      1. No large vessel occlusion. No aneurysm or dissection.  This report was finalized on 10/14/2022 17:49 by Dr. Letitia Cornejo MD.    CT Angiogram Neck [[id number]] Collected: 10/14/22 1742     Updated: 10/14/22 1748    Narrative:      CT ANGIOGRAM NECK- 10/14/2022 5:18 PM CDT     HISTORY: Stroke, follow up; left-sided weakness     COMPARISON: None     DOSE LENGTH PRODUCT: 196 mGy cm. Automated exposure control was also  utilized to decrease patient radiation dose.     TECHNIQUE: Axial images the neck are obtained following IV contrast. 2-D  and maximal intensity projection images are reviewed.     FINDINGS:  Mild calcification of the arch of the thoracic aorta. Bovine  variant to the  arch with a common origin the right brachiocephalic and  left common carotid arteries. Right brachiocephalic artery is patent. No  subclavian artery stenosis identified. The bilateral common, internal,  and external carotid arteries are widely patent. Vertebral arteries  originate from the proximal subclavian arteries as expected. Prominent  hypoplasia right vertebral artery with dominant left vertebral artery.  Mild calcified plaque of the distal left vertebral artery creating less  than 30% stenosis. Basilar artery is supplied by the left vertebral  artery. No focal high-grade stenosis. No aneurysm or dissection.     Mosaic appearance to the visible upper lobe parenchyma may be seen with  air trapping. No apical pneumothorax. No pathologic lymphadenopathy or  soft tissue mass identified within the visible plaque. Slight  exaggerated lordosis of the cervical spine.       Impression:      1. Widely patent bilateral extracranial carotid arteries. 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 Basilar artery supplied by the  left vertebral artery. No focal high-grade stenosis. No aneurysm or  dissection.  This report was finalized on 10/14/2022 17:45 by Dr. Letitia Cornejo MD.    CT Head Without Contrast Stroke Protocol [[id number]] Collected: 10/14/22 1723     Updated: 10/14/22 1729    Narrative:      CT HEAD WO CONTRAST STROKE PROTOCOL- 10/14/2022 5:17 PM CDT     HISTORY: Neuro deficit, acute, stroke suspected, left-sided weakness  with numbness     COMPARISON: None     DOSE LENGTH PRODUCT: 696 mGy cm. Automated exposure control was also  utilized to decrease patient radiation dose.     TECHNIQUE: Axial images of the brain are obtained without IV contrast.     FINDINGS:  The ventricles are normal in size and configuration. There is  no intracranial hemorrhage or mass effect. There are no acute signs of  ischemia based on CT imaging. No extra-axial hematoma or subarachnoid  hemorrhage.     No air-fluid levels  within the visible paranasal sinuses. Mastoid air  cells well-aerated. The bony calvarium appears intact.       Impression:      1. No acute intracranial abnormality identified. No intracranial  hemorrhage. No acute signs of ischemia based on CT exam.     Comments: Findings called to Magda in the ER at 5:26 PM on 10/14/2022  This report was finalized on 10/14/2022 17:26 by Dr. Letitia Cornejo MD.        ECG/EMG Results (last 72 hours)     Procedure Component Value Units Date/Time    SCANNED - TELEMETRY   [[id number]] Resulted: 10/14/22     Updated: 10/14/22 2023    Adult Transthoracic Echo Complete W/ Cont if Necessary Per Protocol (With Agitated Saline) [[id number]] Resulted: 10/15/22 1149     Updated: 10/15/22 1156     Target HR (85%) 136 bpm      Max. Pred. HR (100%) 160 bpm      LVIDd 5.5 cm      LVIDs 4.0 cm      IVSd 1.04 cm      LVPWd 1.04 cm      FS 26.6 %      IVS/LVPW 1.00 cm      ESV(cubed) 64.0 ml      LV Sys Vol (BSA corrected) 29.8 cm2      EDV(cubed) 161.9 ml      LV Acosta Vol (BSA corrected) 56.0 cm2      LVOT area 3.1 cm2      LV mass(C)d 221.1 grams      LVOT diam 2.00 cm      EDV(MOD-sp4) 122.0 ml      ESV(MOD-sp4) 64.9 ml      SV(MOD-sp4) 57.1 ml      SI(MOD-sp4) 26.2 ml/m2      EF(MOD-sp4) 46.8 %      MV E max yomi 81.4 cm/sec      MV A max yomi 96.0 cm/sec      MV dec time 0.15 msec      MV E/A 0.85     LA ESV Index (BP) 32.8 ml/m2      Med Peak E' Yomi 5.9 cm/sec      Lat Peak E' Yomi 10.4 cm/sec      Avg E/e' ratio 9.99     SV(LVOT) 38.0 ml      RV S' 12.6 cm/sec      LA dimension (2D)  4.1 cm      LV V1 max 61.3 cm/sec      LV V1 max PG 1.50 mmHg      LV V1 mean PG 1.00 mmHg      LV V1 VTI 12.1 cm      Ao pk yomi 139.0 cm/sec      Ao max PG 7.7 mmHg      Ao mean PG 4.0 mmHg      Ao V2 VTI 25.2 cm      VELASQUEZ(I,D) 1.51 cm2      MV P1/2t 33.1 msec      MVA(P1/2t) 6.6 cm2      MV dec slope 920.0 cm/sec2      RAP systole 5.0 mmHg      PA V2 max 115.0 cm/sec      Ao root diam 4.0 cm      LA ESV (BP) 58.0  ml     Narrative:      •  Left ventricular ejection fraction appears to be 46 - 50%. Left   ventricular systolic function is mildly decreased.  •  Left ventricular diastolic function was normal.  •  Saline test results are negative.  •  Estimated right ventricular systolic pressure from tricuspid   regurgitation is normal (<35 mmHg).  •  Mild dilation of the aortic root is present.            Orders (last 72 hrs)      Start     Ordered    10/15/22 1318  POC Glucose Once  PROCEDURE ONCE         10/15/22 1305    10/15/22 1258  Discontinue IV  Once,   Status:  Canceled         10/15/22 1258    10/15/22 1256  Discharge patient  Once         10/15/22 1258    10/15/22 1145  perflutren (DEFINITY) lipid microspheres injection 8.476 mg  Once         10/15/22 1052    10/15/22 0933  OT Plan of Care Cert / Re-Cert  Once        Comments: Occupational Therapy Plan of Care  Initial Certification  Certification Period: 10/15/2022 - 2023    Patient Name: Abdullahi MACE  : 1960    (I63.9) Cerebrovascular accident (CVA), unspecified mechanism (HCC)  (primary encounter diagnosis)                Assessment  OT Assessment  Criteria for Skilled Therapeutic Interventions Met (OT): no, no problems identified which require skilled intervention                  Plan    OT Plan  Therapy Frequency (OT): evaluation only  Outcome Evaluation: OT evaluation completed.  Pt is A&Ox4.  He demo no further need for skilled OT services at this time.  Pt completes bed mobility, functional transfers, and mobility in the hallway independently.  He completes LB dressing and sink side grooming independently as well.  Pt does demonstrate some mild LUE weakness and diminished sensation.  His strength is very functional, however OT provided him with handout and verbal directions for LUE strengthening HEP.  Pt is accurate in report of stimuli, temperature, and localization to sensation to LUE, however he reports it feeling diminished compared to his  MONA.  OT provided edu to pt regarding sensory desensitization techniques he can perform at home to maximize sensory function.  Pt verbalized understanding.  OT will sign off at this time and recommend pt to discharge home with assist.      Vivian Alonso, OTR/L, CNT  10/15/2022        By cosigning this order, either electronically or physically, I certify that the therapy services are furnished while this patient is under my care, the services outline above are required by this patient, and will be reviewed every 90 days.        M.D.:__________________________________________ Date: ______________    10/15/22 0932    10/15/22 0813  POC Glucose Once  PROCEDURE ONCE         10/15/22 0800    10/15/22 0800  Insulin Aspart (novoLOG) injection 7 Units  3 Times Daily With Meals,   Status:  Discontinued         10/14/22 1943    10/15/22 0800  Oral Care  2 Times Daily,   Status:  Canceled       10/14/22 1943    10/15/22 0800  Insulin Lispro (humaLOG) injection 7 Units  3 Times Daily With Meals,   Status:  Discontinued         10/14/22 1953    10/15/22 0730  Insulin Lispro (humaLOG) injection 0-9 Units  3 Times Daily Before Meals,   Status:  Discontinued         10/14/22 1943    10/15/22 0700  POC Glucose TID AC  3 Times Daily Before Meals,   Status:  Canceled       10/14/22 1943    10/15/22 0600  Hemoglobin A1c  Morning Draw         10/14/22 1759    10/15/22 0600  Lipid Panel  Morning Draw         10/14/22 1759    10/15/22 0000  Discharge Follow-up with PCP         10/15/22 1258    10/15/22 0000  Activity as Tolerated         10/15/22 1258    10/15/22 0000  Diet: Soft Texture, Consistent Carbohydrate; Thin Liquids, No Restrictions; Whole         10/15/22 1258    10/14/22 2250  POC Glucose Once  PROCEDURE ONCE         10/14/22 2238    10/14/22 2100  atorvastatin (LIPITOR) tablet 80 mg  Nightly,   Status:  Discontinued         10/14/22 1759    10/14/22 2100  insulin detemir (LEVEMIR) injection 30 Units  Nightly,   Status:   Discontinued         10/14/22 1943    10/14/22 2100  sodium chloride 0.9 % flush 10 mL  Every 12 Hours Scheduled,   Status:  Discontinued         10/14/22 1943    10/14/22 2045  Diet Soft Texture; Whole Foods; Consistent Carbohydrate  Diet Effective Now,   Status:  Canceled         10/14/22 2045    10/14/22 2030  empagliflozin (JARDIANCE) tablet 25 mg  Daily,   Status:  Discontinued         10/14/22 1943    10/14/22 2030  pantoprazole (PROTONIX) EC tablet 40 mg  Daily,   Status:  Discontinued         10/14/22 1943    10/14/22 2000  Intake and Output  Every 4 Hours,   Status:  Canceled       10/14/22 1759    10/14/22 2000  Vital Signs  Every 4 Hours,   Status:  Canceled       10/14/22 1943    10/14/22 1944  Intake & Output  Every Shift,   Status:  Canceled       10/14/22 1943    10/14/22 1944  Weigh Patient  Once,   Status:  Canceled         10/14/22 1943    10/14/22 1944  Insert Peripheral IV  Once,   Status:  Canceled         10/14/22 1943    10/14/22 1944  Saline Lock & Maintain IV Access  Continuous,   Status:  Canceled         10/14/22 1943    10/14/22 1944  Place Sequential Compression Device  Once         10/14/22 1943    10/14/22 1944  Maintain Sequential Compression Device  Continuous,   Status:  Canceled         10/14/22 1943    10/14/22 1944  Do NOT Hold Basal or Correction Scale Insulin When Patient is NPO, Hold Scheduled Mealtime (Bolus) Insulin if NPO  Continuous,   Status:  Canceled         10/14/22 1943    10/14/22 1943  sodium chloride 0.9 % flush 10 mL  As Needed,   Status:  Discontinued         10/14/22 1943    10/14/22 1943  acetaminophen (TYLENOL) tablet 650 mg  Every 4 Hours PRN,   Status:  Discontinued        See Hyperspace for full Linked Orders Report.    10/14/22 1943    10/14/22 1943  acetaminophen (TYLENOL) 160 MG/5ML solution 650 mg  Every 4 Hours PRN,   Status:  Discontinued        See Hyperspace for full Linked Orders Report.    10/14/22 1943    10/14/22 1943  acetaminophen (TYLENOL)  suppository 650 mg  Every 4 Hours PRN,   Status:  Discontinued        See Hyperspace for full Linked Orders Report.    10/14/22 1943    10/14/22 1943  ondansetron (ZOFRAN) injection 4 mg  Every 6 Hours PRN,   Status:  Discontinued         10/14/22 1943    10/14/22 1943  Follow Hypoglycemia Standing Orders (Blood Glucose <70)  As Needed,   Status:  Canceled      Comments: Follow Protocol As Outlined in Process Instructions (Open Order Report to View Full Instructions)    10/14/22 1943    10/14/22 1943  dextrose (GLUTOSE) oral gel 15 g  Every 15 Minutes PRN,   Status:  Discontinued         10/14/22 1943    10/14/22 1943  dextrose (D50W) (25 g/50 mL) IV injection 25 g  Every 15 Minutes PRN,   Status:  Discontinued         10/14/22 1943    10/14/22 1943  glucagon (human recombinant) (GLUCAGEN DIAGNOSTIC) injection 1 mg  Every 15 Minutes PRN,   Status:  Discontinued         10/14/22 1943    10/14/22 1802  Inpatient Admission  Once         10/14/22 1801    10/14/22 1801  aspirin chewable tablet 81 mg  Daily,   Status:  Discontinued        See Hyperspace for full Linked Orders Report.    10/14/22 1759    10/14/22 1801  aspirin suppository 300 mg  Daily,   Status:  Discontinued        See Hyperspace for full Linked Orders Report.    10/14/22 1759    10/14/22 1801  clopidogrel (PLAVIX) tablet 75 mg  Daily,   Status:  Discontinued         10/14/22 1759    10/14/22 1800  Neuro checks  Every 2 Hours,   Status:  Canceled      Comments: On arrival and handoff, increase frequency as clinically indicated or for thrombolytic administration, otherwise Q2 hours. Documentation of arrival assessment and any neuro change required.    10/14/22 1714    10/14/22 1800  Notify Provider  Until Discontinued         10/14/22 1759    10/14/22 1757  Adult Transthoracic Echo Complete W/ Cont if Necessary Per Protocol (With Agitated Saline)  Once         10/14/22 1759    10/14/22 1755  Vital Signs  Per Order Details        Comments: For ICU  Admission: Vital Signs Every 2 Hours  For Telemetry Unit Admission: Vital Signs Every 4 Hours    10/14/22 1759    10/14/22 1755  Pulse Oximetry, Continuous  Continuous         10/14/22 1759    10/14/22 1755  Nursing Dysphagia Screening (Complete Prior to Giving Anything By Mouth)  Once         10/14/22 1759    10/14/22 1755  RN to Place Order SLP Consult - Eval & Treat Choosing Reason of RN Dysphagia Screen Failed  Per Order Details        Comments: RN to Place Order SLP Consult - Eval & Treat Choosing Reason of RN Dysphagia Screen Failed    10/14/22 1759    10/14/22 1755  Nurse to Call MD or Nutrition Services for Diet if Patient Passes Dysphagia Screen  Once,   Status:  Canceled         10/14/22 1759    10/14/22 1755  NIHSS Assessment  Every Shift,   Status:  Canceled      Comments: Turn off all sedation medications prior to performing assessment. Assessment to be performed upon admission, transfer to another unit, discharge, and with neurological decline. If NIHSS change is greater than or equal to 4 and/or neurological decline is noted notify physician.    10/14/22 1759    10/14/22 1755  Provide Stroke Education Material  Prior to Discharge,   Status:  Canceled        Comments: Educate patient PRN and daily during hospitalization.    10/14/22 1759    10/14/22 1755  OT Consult: Eval & Treat  Once         10/14/22 1759    10/14/22 1755  PT Consult: Eval & Treat As Tolerated  Once,   Status:  Canceled         10/14/22 1759    10/14/22 1755  SLP Consult: Eval & Treat Communication Disorder  Once        Comments: Per stroke protocol.    10/14/22 1759    10/14/22 1755  Inpatient Case Management  Consult  Once        Provider:  (Not yet assigned)    10/14/22 1759    10/14/22 1755  Inpatient Diabetes Educator Consult  Once,   Status:  Canceled        Provider:  (Not yet assigned)    10/14/22 1759    10/14/22 1755  Assessed for Rehabilitation Services  Once         10/14/22 1759    10/14/22 1755  Reason  for Not Administering IV Thrombolytic  Once         10/14/22 1759    10/14/22 1755  Insert Peripheral IV  Once,   Status:  Canceled         10/14/22 1759    10/14/22 1755  Saline Lock & Maintain IV Access  Continuous,   Status:  Canceled         10/14/22 1759    10/14/22 1755  Place Sequential Compression Device  Once,   Status:  Canceled         10/14/22 1759    10/14/22 1755  Maintain Sequential Compression Device  Continuous,   Status:  Canceled         10/14/22 1759    10/14/22 1755  Code Status and Medical Interventions:  Continuous,   Status:  Canceled         10/14/22 1759    10/14/22 1755  Activity As Tolerated  Until Discontinued         10/14/22 1759    10/14/22 1754  Order CT Head Without Contrast for Neurological Decline  As Needed,   Status:  Canceled       10/14/22 1759    10/14/22 1754  MRI Brain Without Contrast  1 Time Imaging         10/14/22 1753    10/14/22 1738  POC Glucose Once  PROCEDURE ONCE         10/14/22 1724    10/14/22 1722  iopamidol (ISOVUE-370) 76 % injection 125 mL  Once in Imaging         10/14/22 1720    10/14/22 1715  Initiate Department's Acute Stroke Process (Team D, Code 19, etc.)  Once,   Status:  Canceled         10/14/22 1714    10/14/22 1715  Inpatient Neurology Consult Stroke  Once        Specialty:  Neurology  Provider:  Jeff Chisholm MD    10/14/22 1714    10/14/22 1715  Inpatient Neurology Consult Stroke  Once,   Status:  Canceled        Specialty:  Neurology  Provider:  (Not yet assigned)    10/14/22 1714    10/14/22 1715  Perform NIH Stroke Scale  Once        Comments: Perform NIH Stroke Scale frequency: Stat, prior to thrombolytic/intervention, repeat as needed per protocol.    10/14/22 1714    10/14/22 1715  Measure Actual Weight  Once         10/14/22 1714    10/14/22 1715  Notify MD for SBP < 80 or > 200  Until Discontinued,   Status:  Canceled        Comments: Do not treat until SBP >= 220, unless patient is a thrombolytic candidate.    10/14/22 1714     10/14/22 1715  Notify Provider for SBP greater than 140 if hemorrhagic stroke  Once,   Status:  Canceled        Comments: Notify Provider for SBP greater than 140 if hemorrhagic Stroke and seek Nicardipine infusion for Hemorrhagic CVA order.    10/14/22 1714    10/14/22 1715  Head of bed 30 degrees or less  Until Discontinued         10/14/22 1714    10/14/22 1715  Cardiac monitoring  Continuous,   Status:  Canceled         10/14/22 1714    10/14/22 1715  Undress and Gown  Once         10/14/22 1714    10/14/22 1715  NPO Diet NPO Type: Strict NPO  Diet Effective Now,   Status:  Canceled         10/14/22 1714    10/14/22 1715  Cardiac Monitoring  Continuous,   Status:  Canceled         10/14/22 1714    10/14/22 1715  Continuous Pulse Oximetry  Continuous,   Status:  Canceled         10/14/22 1714    10/14/22 1715  Vital signs  Per Hospital Policy        Comments: Every 30 minutes - Increase frequency as clinically indicated or for thrombolytic administration.    10/14/22 1714    10/14/22 1715  No Hypotonic Fluids  Until Discontinued,   Status:  Canceled         10/14/22 1714    10/14/22 1715  Nursing swallow assessment  Once         10/14/22 1714    10/14/22 1715  CT Head Without Contrast Stroke Protocol  1 Time Imaging         10/14/22 1714    10/14/22 1715  CT Angiogram Head w AI Analysis of LVO  1 Time Imaging        Comments: Neuro deficit, acute, stroke suspected    10/14/22 1714    10/14/22 1715  CT Angiogram Neck  1 Time Imaging        Comments: Neuro deficit, acute, stroke suspected    10/14/22 1714    10/14/22 1715  CT CEREBRAL PERFUSION WITH & WITHOUT CONTRAST  1 Time Imaging,   Status:  Canceled        Comments: Neuro deficit, acute, stroke suspected    10/14/22 1714    10/14/22 1715  XR Chest 1 View  1 Time Imaging        Comments: Do not delay CT Head to obtain    10/14/22 1714    10/14/22 1715  ECG 12 Lead  Once,   Status:  Canceled        Comments: Do not delay CT Head to obtain    10/14/22 1714     10/14/22 1715  Insert large-bore peripheral IV - Right AC preferred  Once,   Status:  Canceled         10/14/22 1714    10/14/22 1715  [place] Draw  Once         10/14/22 1714    10/14/22 1715  POC Glucose Once  Once         10/14/22 1714    10/14/22 1715  CBC & Differential  Once         10/14/22 1714    10/14/22 1715  Comprehensive Metabolic Panel  Once         10/14/22 1714    10/14/22 1715  Protime-INR  Once         10/14/22 1714    10/14/22 1715  aPTT  Once         10/14/22 1714    10/14/22 1715  Troponin  Once         10/14/22 1714    10/14/22 1715  Type & Screen  Once         10/14/22 1714    10/14/22 1715  Green Top (Gel)  PROCEDURE ONCE         10/14/22 1714    10/14/22 1715  Lavender Top  PROCEDURE ONCE         10/14/22 1714    10/14/22 1715  Red Top  PROCEDURE ONCE         10/14/22 1714    10/14/22 1715  Light Blue Top  PROCEDURE ONCE         10/14/22 1714    10/14/22 1715  CBC Auto Differential  PROCEDURE ONCE         10/14/22 1714    10/14/22 1714  Oxygen Therapy- Nasal Cannula; 2 LPM; Titrate for SPO2: equal to or greater than, 94%  Continuous PRN,   Status:  Canceled      Comments: 2-6 Lpm    10/14/22 1714    10/14/22 1714  sodium chloride 0.9 % flush 10 mL  As Needed,   Status:  Discontinued         10/14/22 1714    --  SCANNED - TELEMETRY           10/14/22 0000                   Physician Progress Notes (last 72 hours)      Harsh Simmons PA-C at 10/15/22 1043     Attestation signed by Jeff Chisholm MD at 10/15/22 1115    I have reviewed this documentation and agree.  Patient seen and examined by me.  Doing well today.  No neuro changes overnight.  MRI of the brain shows no acute features.  There are some small vessel disease noted on the scan.  The official results are still pending.  Therapy is recommending the patient be discharged home with assist.  His work-up does show an LDL of 76 which is just above goal.  His hemoglobin A1c is currently 8.7 and we recommended increasing his glycemic  control to achieve a hemoglobin A1c goal of less than 6.  Patient is cleared from a neurology standpoint be discharged home with follow-up in cardiac echo for later today.    Electronically signed by Jeff Chisholm MD, 10/15/22, 11:15 AM CDT.                      Neurology Progress Note      Chief Complaint:    Left-sided weakness    Subjective     Subjective:  Patient is doing about the same this morning without any new focal neurologic complaints.  He continues to report sensory > motor deficits of the left upper and lower extremity.  This is superimposed over diabetic peripheral neuropathy affecting both lower extremities.  Patient has not been out of bed ambulating this morning.  MRI brain is pending.  Echocardiogram pending.    Hemoglobin A1c elevated at 8.70%.  LDL 76.      Past Medical History:   Diagnosis Date   • Cerebrovascular accident (CVA), unspecified mechanism (HCC) 10/14/2022   • Diabetes mellitus (HCC)    • Hypertension    • Premature ventricular beats      Past Surgical History:   Procedure Laterality Date   • APPENDECTOMY     • COLONOSCOPY  10/22/2013   • COLONOSCOPY N/A 9/19/2022    Procedure: COLONOSCOPY WITH ANESTHESIA;  Surgeon: Rian Nobles MD;  Location: [facility] ENDOSCOPY;  Service: Gastroenterology;  Laterality: N/A;  preop; hx of polyps  postop; hemmhroids   PCP Davi Gandhi    • ENDOSCOPY N/A 9/19/2022    Procedure: ESOPHAGOGASTRODUODENOSCOPY WITH ANESTHESIA;  Surgeon: Rian Nobles MD;  Location: [facility] ENDOSCOPY;  Service: Gastroenterology;  Laterality: N/A;  preop; dysphagia  postop; r/o barretts; esophagitis   PCP Davi Gandhi      Family History   Problem Relation Age of Onset   • Diabetes Mother    • Stroke Mother    • Heart disease Father    • Colon cancer Brother      Social History     Tobacco Use   • Smoking status: Never   • Smokeless tobacco: Never   Vaping Use   • Vaping Use: Never used   Substance Use Topics   • Alcohol use: No   • Drug use: No        Medications:  Current Facility-Administered Medications   Medication Dose Route Frequency Provider Last Rate Last Admin   • acetaminophen (TYLENOL) tablet 650 mg  650 mg Oral Q4H PRN Shaun Cortez MD        Or   • acetaminophen (TYLENOL) 160 MG/5ML solution 650 mg  650 mg Oral Q4H PRN Shaun Cortez MD        Or   • acetaminophen (TYLENOL) suppository 650 mg  650 mg Rectal Q4H PRN Shaun Cortez MD       • aspirin chewable tablet 81 mg  81 mg Oral Daily Shaun Cortez MD        Or   • aspirin suppository 300 mg  300 mg Rectal Daily Shaun Cortez MD       • atorvastatin (LIPITOR) tablet 80 mg  80 mg Oral Nightly Shaun Cortez MD   80 mg at 10/14/22 2239   • clopidogrel (PLAVIX) tablet 75 mg  75 mg Oral Daily Shaun Cortez MD   75 mg at 10/14/22 1850   • dextrose (D50W) (25 g/50 mL) IV injection 25 g  25 g Intravenous Q15 Min PRN Shaun Cortez MD       • dextrose (GLUTOSE) oral gel 15 g  15 g Oral Q15 Min PRN Shaun Cortez MD       • empagliflozin (JARDIANCE) tablet 25 mg  25 mg Oral Daily Shaun Cortez MD       • glucagon (human recombinant) (GLUCAGEN DIAGNOSTIC) injection 1 mg  1 mg Intramuscular Q15 Min PRN Shaun Cortez MD       • insulin detemir (LEVEMIR) injection 30 Units  30 Units Subcutaneous Nightly Shaun Cortez MD   30 Units at 10/14/22 2241   • Insulin Lispro (humaLOG) injection 0-9 Units  0-9 Units Subcutaneous TID AC Shaun Cortez MD       • Insulin Lispro (humaLOG) injection 7 Units  7 Units Subcutaneous TID With Meals Shaun Cortez MD       • ondansetron (ZOFRAN) injection 4 mg  4 mg Intravenous Q6H PRN Shaun Cortez MD       • pantoprazole (PROTONIX) EC tablet 40 mg  40 mg Oral Daily Shaun Cortez MD       • sodium chloride 0.9 % flush 10 mL  10 mL Intravenous PRN Shaun Cortez MD       • sodium chloride 0.9 % flush 10 mL  10 mL Intravenous Q12H Shaun Cortez  MD KEVIN   10 mL at 10/14/22 2239   • sodium chloride 0.9 % flush 10 mL  10 mL Intravenous PRN Shaun Cortez MD           Allergies:    Patient has no known allergies.    Review of Systems:   -A 14-point review of systems is completed and is negative.      Objective      Vital Signs  Temp:  [97.6 °F (36.4 °C)-98.2 °F (36.8 °C)] 97.6 °F (36.4 °C)  Heart Rate:  [74-82] 80  Resp:  [14-18] 18  BP: ()/(64-85) 97/64    Physical Exam:    General Exam:  Head:  Normocephalic, atraumatic  HEENT:  Neck supple  CVS:  Regular rate and rhythm.    Carotid Examination:    Lungs:  Clear t  Abdomen:  Non-tender, Non-distended  Extremities:  No signs of peripheral edema  Skin:  No rashes      Neurologic Exam:  Mental Status:    -Awake. Alert. Oriented to person, place & time.  -No word finding difficulties.  -No aphasia.  -No dysarthria.  -Follows simple commands.     CN II:  Full visual fields with confrontation.  Pupils equally reactive to light.  CN III, IV, VI:  Extraocular muscles function intact with no nystagmus.  CN V:  Facial sensory is symmetric.  CN VII:  Facial motor symmetric.  CN VIII:  Gross hearing intact bilaterally.  CN IX/X:  Palate elevates symmetrically.  CN XI:  Shoulder shrug symmetric.  CN XII:  Tongue is midline on protrusion.     Motor: (strength out of 5:  1= minimal movement, 2 = movement in plane of gravity, 3 = movement against gravity, 4 = movement against some resistance, 5 = full strength)     -Right Upper Ext: Proximal: 5 Distal: 5  -Left Upper Ext: Proximal: 5- Distal: 5    -Right Lower Ext: Proximal: 5 Distal: 5  -Left Lower Ext: Proximal: 4+ Distal: 5       Deep Tendon Reflexes:  -Right              Biceps: 2+         Triceps: 2+      Brachioradialis: 2+              Patella: 2+       Ankle: 2+           -Left              Biceps: 2+         Triceps: 2+      Brachioradialis: 2+              Patella: 2+       Ankle: 2+             Tone (Modified Mervat Scale):  No appreciable increase  in tone or rigidity noted.     Sensory:  -Subjectively, decree sensation throughout the entirety of the left upper and lower extremity.  -Bilateral lower extremities with decreased sensation in a stocking distribution from the knees distally from diabetic peripheral neuropathy.     Coordination:  -Finger to nose intact BUEs  -Heel to shin intact BLEs  -No ataxia     Gait  -No signs of ataxia  -ambulates unassisted       Results Review:    I reviewed the patient's new clinical results and findings.    Lab Results (last 24 hours)     Procedure Component Value Units Date/Time    POC Glucose Once [[id number]]  (Normal) Collected: 10/15/22 0800    Specimen: Blood Updated: 10/15/22 0812     Glucose 94 mg/dL      Comment: : [id number] Jamie Deep-SecureyMeter ID: [id number]       Hemoglobin A1c [[id number]]  (Abnormal) Collected: 10/15/22 0413    Specimen: Blood Updated: 10/15/22 0447     Hemoglobin A1C 8.70 %     Narrative:      Hemoglobin A1C Ranges:    Increased Risk for Diabetes  5.7% to 6.4%  Diabetes                     >= 6.5%  Diabetic Goal                < 7.0%    Lipid Panel [[id number]]  (Abnormal) Collected: 10/15/22 0413    Specimen: Blood Updated: 10/15/22 0447     Total Cholesterol 126 mg/dL      Triglycerides 85 mg/dL      HDL Cholesterol 33 mg/dL      LDL Cholesterol  76 mg/dL      VLDL Cholesterol 17 mg/dL      LDL/HDL Ratio 2.30    Narrative:      Cholesterol Reference Ranges  (U.S. Department of Health and Human Services ATP III Classifications)    Desirable          <200 mg/dL  Borderline High    200-239 mg/dL  High Risk          >240 mg/dL      Triglyceride Reference Ranges  (U.S. Department of Health and Human Services ATP III Classifications)    Normal           <150 mg/dL  Borderline High  150-199 mg/dL  High             200-499 mg/dL  Very High        >500 mg/dL    HDL Reference Ranges  (U.S. Department of Health and Human Services ATP III Classifications)    Low     <40 mg/dl (major risk factor for  CHD)  High    >60 mg/dl ('negative' risk factor for CHD)        LDL Reference Ranges  (U.S. Department of Health and Human Services ATP III Classifications)    Optimal          <100 mg/dL  Near Optimal     100-129 mg/dL  Borderline High  130-159 mg/dL  High             160-189 mg/dL  Very High        >189 mg/dL    POC Glucose Once [[id number]]  (Abnormal) Collected: 10/14/22 2238    Specimen: Blood Updated: 10/14/22 2249     Glucose 154 mg/dL      Comment: : [email or web address] French RochaMeter ID: [id number]       [place] Draw [[id number]] Collected: 10/14/22 1719    Specimen: Blood Updated: 10/14/22 1831    Narrative:      The following orders were created for panel order [place] Draw.  Procedure                               Abnormality         Status                     ---------                               -----------         ------                     Green Top (Gel)[[id number]]                                  Final result               Lavender Top[[id number]]                                     Final result               Red Top[[id number]]                                          Final result               Light Blue Top[[id number]]                                   Final result                 Please view results for these tests on the individual orders.    Lavender Top [[id number]] Collected: 10/14/22 1719    Specimen: Blood Updated: 10/14/22 1831     Extra Tube hold for add-on     Comment: Auto resulted       Green Top (Gel) [[id number]] Collected: 10/14/22 1719    Specimen: Blood Updated: 10/14/22 1831     Extra Tube Hold for add-ons.     Comment: Auto resulted.       Red Top [[id number]] Collected: 10/14/22 1719    Specimen: Blood Updated: 10/14/22 1831     Extra Tube Hold for add-ons.     Comment: Auto resulted.       Light Blue Top [[id number]] Collected: 10/14/22 1719    Specimen: Blood Updated: 10/14/22 1831     Extra Tube Hold for add-ons.     Comment: Auto resulted       Comprehensive Metabolic Panel  [[id number]]  (Abnormal) Collected: 10/14/22 1719    Specimen: Blood Updated: 10/14/22 1754     Glucose 206 mg/dL      BUN 18 mg/dL      Creatinine 1.19 mg/dL      Sodium 136 mmol/L      Potassium 4.6 mmol/L      Comment: Slight hemolysis detected by analyzer. Results may be affected.        Chloride 100 mmol/L      CO2 22.0 mmol/L      Calcium 9.6 mg/dL      Total Protein 7.6 g/dL      Albumin 4.50 g/dL      ALT (SGPT) 22 U/L      AST (SGOT) 26 U/L      Comment: Slight hemolysis detected by analyzer. Results may be affected.        Alkaline Phosphatase 109 U/L      Total Bilirubin 0.3 mg/dL      Globulin 3.1 gm/dL      A/G Ratio 1.5 g/dL      BUN/Creatinine Ratio 15.1     Anion Gap 14.0 mmol/L      eGFR 69.9 mL/min/1.73      Comment: National Kidney Foundation and American Society of Nephrology (ASN) Task Force recommended calculation based on the Chronic Kidney Disease Epidemiology Collaboration (CKD-EPI) equation refit without adjustment for race.       Narrative:      GFR Normal >60  Chronic Kidney Disease <60  Kidney Failure <15      Troponin [[id number]]  (Normal) Collected: 10/14/22 1719    Specimen: Blood Updated: 10/14/22 1752     Troponin T <0.010 ng/mL     Narrative:      Troponin T Reference Range:  <= 0.03 ng/mL-   Negative for AMI  >0.03 ng/mL-     Abnormal for myocardial necrosis.  Clinicians would have to utilize clinical acumen, EKG, Troponin and serial changes to determine if it is an Acute Myocardial Infarction or myocardial injury due to an underlying chronic condition.       Results may be falsely decreased if patient taking Biotin.      Protime-INR [[id number]]  (Normal) Collected: 10/14/22 1719    Specimen: Blood Updated: 10/14/22 1745     Protime 12.4 Seconds      INR 0.96    aPTT [[id number]]  (Normal) Collected: 10/14/22 1719    Specimen: Blood Updated: 10/14/22 1745     PTT 31.0 seconds     POC Glucose Once [[id number]]  (Abnormal) Collected: 10/14/22 1724    Specimen: Blood Updated: 10/14/22  1737     Glucose 200 mg/dL      Comment: : [id number] Samir Ashrafer ID: [id number]       CBC & Differential [[id number]]  (Normal) Collected: 10/14/22 1719    Specimen: Blood Updated: 10/14/22 1734    Narrative:      The following orders were created for panel order CBC & Differential.  Procedure                               Abnormality         Status                     ---------                               -----------         ------                     CBC Auto Differential[[id number]]        Normal              Final result                 Please view results for these tests on the individual orders.    CBC Auto Differential [[id number]]  (Normal) Collected: 10/14/22 1719    Specimen: Blood Updated: 10/14/22 1734     WBC 7.74 10*3/mm3      RBC 5.50 10*6/mm3      Hemoglobin 16.9 g/dL      Hematocrit 47.5 %      MCV 86.4 fL      MCH 30.7 pg      MCHC 35.6 g/dL      RDW 12.8 %      RDW-SD 40.1 fl      MPV 9.8 fL      Platelets 182 10*3/mm3      Neutrophil % 53.3 %      Lymphocyte % 30.9 %      Monocyte % 10.1 %      Eosinophil % 5.0 %      Basophil % 0.4 %      Immature Grans % 0.3 %      Neutrophils, Absolute 4.13 10*3/mm3      Lymphocytes, Absolute 2.39 10*3/mm3      Monocytes, Absolute 0.78 10*3/mm3      Eosinophils, Absolute 0.39 10*3/mm3      Basophils, Absolute 0.03 10*3/mm3      Immature Grans, Absolute 0.02 10*3/mm3      nRBC 0.0 /100 WBC           Imaging Results (Last 24 Hours)     Procedure Component Value Units Date/Time    MRI Brain Without Contrast [[id number]] Resulted: 10/15/22 1043     Updated: 10/15/22 1043    XR Chest 1 View [[id number]] Collected: 10/14/22 1816     Updated: 10/14/22 1820    Narrative:      HISTORY: Acute stroke protocol, left-sided weakness     CXR: Frontal view the chest obtained.     COMPARISON: None     FINDINGS: Density adjacent the left cardiac apex favorable for  epicardial fat. Lungs are free of consolidation, collapse, edema. Heart  is upper limits of normal in  size. No pleural effusion or pneumothorax.  No acute regional bony pathology.       Impression:      1. No acute radiographic cardiopulmonary process.  This report was finalized on 10/14/2022 18:16 by Dr. Letitia Cornejo MD.    CT Angiogram Head w AI Analysis of LVO [[id number]] Collected: 10/14/22 1746     Updated: 10/14/22 1752    Narrative:      CT ANGIOGRAM HEAD W AI ANALYSIS OF LVO- 10/14/2022 5:18 PM CDT     HISTORY: Stroke, follow up; left-sided weakness     COMPARISON: None     DOSE LENGTH PRODUCT: 196 mGy cm. Automated exposure control was also  utilized to decrease patient radiation dose.     TECHNIQUE: Axial images of brain are obtained following IV contrast. 2-D  and maximal intensity projection images are reviewed. AI analysis of LVO  was utilized.        FINDINGS:  Minimal calcification considered within the cavernous  segments of the distal internal carotid arteries creating less than 25%  stenosis. 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  Bilateral anterior middle cerebral arteries are patent and. Hypoplastic  right vertebral artery with the basilar artery supplied by the left  vertebral artery. Mild calcified plaque of the distal left vertebral  artery creating less than 50% stenosis. There is artery slightly small  in caliber with no focal high-grade stenosis. Bilateral posterior  cerebral arteries are patent. No large vessel occlusion. No aneurysm or  dissection. No abnormal intracranial enhancement.       Impression:      1. No large vessel occlusion. No aneurysm or dissection.  This report was finalized on 10/14/2022 17:49 by Dr. Letitia Cornejo MD.    CT Angiogram Neck [[id number]] Collected: 10/14/22 1742     Updated: 10/14/22 1748    Narrative:      CT ANGIOGRAM NECK- 10/14/2022 5:18 PM CDT     HISTORY: Stroke, follow up; left-sided weakness     COMPARISON: None     DOSE LENGTH PRODUCT: 196 mGy cm. Automated exposure control was also  utilized to decrease patient radiation  dose.     TECHNIQUE: Axial images the neck are obtained following IV contrast. 2-D  and maximal intensity projection images are reviewed.     FINDINGS:  Mild calcification of the arch of the thoracic aorta. Bovine  variant to the arch with a common origin the right brachiocephalic and  left common carotid arteries. Right brachiocephalic artery is patent. No  subclavian artery stenosis identified. The bilateral common, internal,  and external carotid arteries are widely patent. Vertebral arteries  originate from the proximal subclavian arteries as expected. Prominent  hypoplasia right vertebral artery with dominant left vertebral artery.  Mild calcified plaque of the distal left vertebral artery creating less  than 30% stenosis. Basilar artery is supplied by the left vertebral  artery. No focal high-grade stenosis. No aneurysm or dissection.     Mosaic appearance to the visible upper lobe parenchyma may be seen with  air trapping. No apical pneumothorax. No pathologic lymphadenopathy or  soft tissue mass identified within the visible plaque. Slight  exaggerated lordosis of the cervical spine.       Impression:      1. Widely patent bilateral extracranial carotid arteries. 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 Basilar artery supplied by the  left vertebral artery. No focal high-grade stenosis. No aneurysm or  dissection.  This report was finalized on 10/14/2022 17:45 by Dr. Letitia Cornejo MD.    CT Head Without Contrast Stroke Protocol [[id number]] Collected: 10/14/22 1723     Updated: 10/14/22 1729    Narrative:      CT HEAD WO CONTRAST STROKE PROTOCOL- 10/14/2022 5:17 PM CDT     HISTORY: Neuro deficit, acute, stroke suspected, left-sided weakness  with numbness     COMPARISON: None     DOSE LENGTH PRODUCT: 696 mGy cm. Automated exposure control was also  utilized to decrease patient radiation dose.     TECHNIQUE: Axial images of the brain are obtained without IV contrast.     FINDINGS:  The  ventricles are normal in size and configuration. There is  no intracranial hemorrhage or mass effect. There are no acute signs of  ischemia based on CT imaging. No extra-axial hematoma or subarachnoid  hemorrhage.     No air-fluid levels within the visible paranasal sinuses. Mastoid air  cells well-aerated. The bony calvarium appears intact.       Impression:      1. No acute intracranial abnormality identified. No intracranial  hemorrhage. No acute signs of ischemia based on CT exam.     Comments: Findings called to Magda in the ER at 5:26 PM on 10/14/2022  This report was finalized on 10/14/2022 17:26 by Dr. Letitia Cornejo MD.          Assessment/Plan     Hospital Problem List      Cerebrovascular accident (CVA), unspecified mechanism (HCC)    Hypertension    Hyperlipidemia-welchlor    Gastroesophageal reflux disease    Type 2 diabetes mellitus with hyperglycemia (HCC)      Impression    • Acute onset left-sided numbness and mild weakness  • Probable right thalamic CVA--awaiting MRI  • Diabetes mellitus  • Hypertension, essential  • Diabetic peripheral polyneuropathy, BLEs    Plan    • Aspirin 81 mg daily  • If MRI positive for stroke, and add Plavix 75 mg for 1 month then back to aspirin 81 mg daily  • High-dose atorvastatin 80 mg daily.  Target LDL less than 70.  • Improved glycemic control with target hemoglobin A1c of 6.5%.  • I have recommended instituting regular cardiovascular exercise in the form of walking, biking or swimming 30-40 minutes at a time at least 3-4 times per week.  • Await MRI brain.  • Await 2D echocardiogram.  • Likely can discharge home later today following MRI and completion of echocardiogram.          Harsh Simmons PA-C  10/15/22  10:44 CDT      Electronically signed by Jeff Chisholm MD at 10/15/22 1115          Consult Notes (last 72 hours)      Harsh Simmons PA-C at 10/14/22 9348      Consult Orders    1. Inpatient Neurology Consult Stroke [[id number]] ordered by Eva  Katy GERARDO MD at 10/14/22 0652          Attestation signed by Jeff Chisholm MD at 10/15/22 7018    I have reviewed this documentation and agree.  Patient seen and examined by me.  Patient is a 62-year-old male with known stroke risk factors of hypertension and diabetes mellitus.  He presents with an acute onset of decree sensation left arm and leg with potentially some mild weakness as well.  Code stroke was called.  I spoke with the ER provider at that time.  Presented to the ER and found him to have sensory deficit in the left arm and leg which were mild in nature but no objective weakness on my examination.  Head CT without contrast showed no acute findings and CT angiography of the head and neck showed no evidence of large vessel.  We did offer tPA to the patient and the patient refused.  We are recommending admission for an MRI, cardiac echo, cardiac telemetry, blood pressure control aspirin and high-dose statin.    50 minutes of critical care was performed as this is a code stroke    Evaluation patient, rapid radiographic imaging, and Tpa decision making.    Electronically signed by Jeff Chisholm MD, 10/15/22, 11:09 AM CDT.                        Neurology Consultation Note    Referring Provider:   Dr. Dez Morin MD    Reason for Consultation:    Code stroke    Subjective     History of Present Illness:  This is a very pleasant 62-year-old right-hand-dominant male routinely cared for by Davi Gandhi MD, who has a history of hypertension and diabetes mellitus.  Patient does not know how well-controlled his diabetes is as his last hemoglobin A1c was more than a year ago due to avoidance of outpatient appointments from COVID-19.    Today, the patient was with his family at a [employer] patch when he noticed he had difficulty with sensation of the left arm and left leg with some weakness of the left leg.  The patient does have known diabetic peripheral neuropathy that affects both lower extremities  and he also has a history of a poorly healing wound and the left foot that required hyperbaric oxygen chamber treatment which was resolved poorly controlled diabetes mellitus.    The patient presented approximately 2 hours after the onset of symptoms.  Initial CT of the head was negative for acute intracranial process and CT of the head and neck was negative for large vessel occlusion, but did demonstrate mild, diffuse atheromatous disease and a very diminutive right vertebral artery and a left dominant vertebral system.    On arrival, NIHSS is 2 for sensory deficit in the left arm and leg and very subtle weakness in the distal left upper extremity.    Because of these concerns, the patient was not felt to be a candidate for tPA administration.  We did discuss the risks, benefits & alternatives to the administration of the medication with the patient and he elected not to proceed with this.      Past Medical History:   Diagnosis Date   • Diabetes mellitus (HCC)    • Hypertension    • Premature ventricular beats        No Known Allergies  No current facility-administered medications on file prior to encounter.     Current Outpatient Medications on File Prior to Encounter   Medication Sig   • Insulin Glargine (BASAGLAR KWIKPEN) 100 UNIT/ML injection pen Inject 50 Units under the skin into the appropriate area as directed Daily for 90 days.   • aspirin 81 MG EC tablet Take 81 mg by mouth Daily.   • colesevelam (WELCHOL) 625 MG tablet TAKE 3 TABLETS BY MOUTH TWICE DAILY WITH MEALS   • Dulaglutide 3 MG/0.5ML solution pen-injector Inject 3 mg under the skin into the appropriate area as directed 1 (One) Time Per Week.   • insulin aspart (NovoLOG FlexPen) 100 UNIT/ML solution pen-injector sc pen Inject 7 Units under the skin into the appropriate area as directed 3 (Three) Times a Day With Meals.   • Insulin Pen Needle (B-D UF III MINI PEN NEEDLES) 31G X 5 MM misc 1 Device 4 (Four) Times a Day.   • Jardiance 25 MG tablet  tablet TAKE 1 TABLET DAILY   • lisinopril (PRINIVIL,ZESTRIL) 10 MG tablet TAKE 1 TABLET BY MOUTH DAILY   • metFORMIN (GLUCOPHAGE) 500 MG tablet Take 2 tablets by mouth 2 (Two) Times a Day With Meals.   • pantoprazole (PROTONIX) 40 MG EC tablet Take 1 tablet by mouth Daily.   • tadalafil (CIALIS) 20 MG tablet Take 20 mg by mouth Daily As Needed for erectile dysfunction.   • [DISCONTINUED] Insulin Glargine (BASAGLAR KWIKPEN) 100 UNIT/ML injection pen INJECT 50 UNITS            SUBCUTANEOUSLY INTO        APPROPRIATE AREA DAILY AS  DIRECTED       Social History     Socioeconomic History   • Marital status:      Spouse name: Ada   • Number of children: 3   • Years of education: 14   Tobacco Use   • Smoking status: Never   • Smokeless tobacco: Never   Vaping Use   • Vaping Use: Never used   Substance and Sexual Activity   • Alcohol use: No   • Drug use: No   • Sexual activity: Defer     Family History   Problem Relation Age of Onset   • Diabetes Mother    • Stroke Mother    • Heart disease Father    • Colon cancer Brother        Review of Systems  A 14 point review of systems was reviewed and was negative.    Objective      Vital Signs  Temp:  [98.2 °F (36.8 °C)] 98.2 °F (36.8 °C)  Heart Rate:  [75] 75  Resp:  [14] 14  BP: (169)/(85) 169/85    General Exam:  Head:  Normocephalic, atraumatic  HEENT:  Neck supple  CVS:  Regular rate and rhythm.    Carotid Examination:    Lungs:  Clear t  Abdomen:  Non-tender, Non-distended  Extremities:  No signs of peripheral edema  Skin:  No rashes      Neurologic Exam:  Mental Status:    -Awake. Alert. Oriented to person, place & time.  -No word finding difficulties.  -No aphasia.  -No dysarthria.  -Follows simple commands.     CN II:  Full visual fields with confrontation.  Pupils equally reactive to light.  CN III, IV, VI:  Extraocular muscles function intact with no nystagmus.  CN V:  Facial sensory is symmetric.  CN VII:  Facial motor symmetric.  CN VIII:  Gross hearing intact  bilaterally.  CN IX/X:  Palate elevates symmetrically.  CN XI:  Shoulder shrug symmetric.  CN XII:  Tongue is midline on protrusion.     Motor: (strength out of 5:  1= minimal movement, 2 = movement in plane of gravity, 3 = movement against gravity, 4 = movement against some resistance, 5 = full strength)     -Right Upper Ext: Proximal: 5 Distal: 5  -Left Upper Ext: Proximal: 5 Distal: 5    -Right Lower Ext: Proximal: 5 Distal: 5  -Left Lower Ext: Proximal: 5 Distal: 5       Deep Tendon Reflexes:  -Right              Biceps: 2+         Triceps: 2+      Brachioradialis: 2+              Patella: 2+       Ankle: 2+         Babinski:  negative  -Left              Biceps: 2+         Triceps: 2+      Brachioradialis: 2+              Patella: 2+       Ankle: 2+         Babinski:  negative    Tone (Modified Mervat Scale):  No appreciable increase in tone or rigidity noted.     Sensory:  -Intact to light touch, pinprick BUE (C5-T1) and BLE (L2-S1).     Coordination:  -Finger to nose intact BUEs  -Heel to shin intact BLEs  -No ataxia     Gait  -No signs of ataxia  -ambulates unassisted    Results Review:  Lab Results (last 24 hours)     Procedure Component Value Units Date/Time    POC Glucose Once [[id number]]  (Abnormal) Collected: 10/14/22 1724    Specimen: Blood Updated: 10/14/22 1737     Glucose 200 mg/dL      Comment: : [id number] Samir PinedaantoinettebethMeter ID: [id number]       CBC & Differential [[id number]]  (Normal) Collected: 10/14/22 1719    Specimen: Blood Updated: 10/14/22 1734    Narrative:      The following orders were created for panel order CBC & Differential.  Procedure                               Abnormality         Status                     ---------                               -----------         ------                     CBC Auto Differential[[id number]]        Normal              Final result                 Please view results for these tests on the individual orders.    CBC Auto Differential  [[id number]]  (Normal) Collected: 10/14/22 1719    Specimen: Blood Updated: 10/14/22 1734     WBC 7.74 10*3/mm3      RBC 5.50 10*6/mm3      Hemoglobin 16.9 g/dL      Hematocrit 47.5 %      MCV 86.4 fL      MCH 30.7 pg      MCHC 35.6 g/dL      RDW 12.8 %      RDW-SD 40.1 fl      MPV 9.8 fL      Platelets 182 10*3/mm3      Neutrophil % 53.3 %      Lymphocyte % 30.9 %      Monocyte % 10.1 %      Eosinophil % 5.0 %      Basophil % 0.4 %      Immature Grans % 0.3 %      Neutrophils, Absolute 4.13 10*3/mm3      Lymphocytes, Absolute 2.39 10*3/mm3      Monocytes, Absolute 0.78 10*3/mm3      Eosinophils, Absolute 0.39 10*3/mm3      Basophils, Absolute 0.03 10*3/mm3      Immature Grans, Absolute 0.02 10*3/mm3      nRBC 0.0 /100 WBC     Lavender Top [[id number]] Collected: 10/14/22 1719    Specimen: Blood Updated: 10/14/22 1730    Protime-INR [[id number]] Collected: 10/14/22 1719    Specimen: Blood Updated: 10/14/22 1730    aPTT [[id number]] Collected: 10/14/22 1719    Specimen: Blood Updated: 10/14/22 1730    Light Blue Top [[id number]] Collected: 10/14/22 1719    Specimen: Blood Updated: 10/14/22 1730    Red Top [[id number]] Collected: 10/14/22 1719    Specimen: Blood Updated: 10/14/22 1730    [place] Draw [[id number]] Collected: 10/14/22 1719    Specimen: Blood Updated: 10/14/22 1730    Narrative:      The following orders were created for panel order [place] Draw.  Procedure                               Abnormality         Status                     ---------                               -----------         ------                     Green Top (Gel)[[id number]]                                  In process                 Lavender Top[[id number]]                                     In process                 Red Top[[id number]]                                          In process                 Light Blue Top[[id number]]                                   In process                   Please view results for these tests on the  individual orders.    Comprehensive Metabolic Panel [[id number]] Collected: 10/14/22 1719    Specimen: Blood Updated: 10/14/22 1730    Troponin [[id number]] Collected: 10/14/22 1719    Specimen: Blood Updated: 10/14/22 1730    Green Top (Gel) [[id number]] Collected: 10/14/22 1719    Specimen: Blood Updated: 10/14/22 1730          .  Imaging Results (Last 24 Hours)     Procedure Component Value Units Date/Time    CT CEREBRAL PERFUSION WITH & WITHOUT CONTRAST [[id number]] Resulted: 10/14/22 1729     Updated: 10/14/22 1735    CT Head Without Contrast Stroke Protocol [[id number]] Collected: 10/14/22 1723     Updated: 10/14/22 1729    Narrative:      CT HEAD WO CONTRAST STROKE PROTOCOL- 10/14/2022 5:17 PM CDT     HISTORY: Neuro deficit, acute, stroke suspected, left-sided weakness  with numbness     COMPARISON: None     DOSE LENGTH PRODUCT: 696 mGy cm. Automated exposure control was also  utilized to decrease patient radiation dose.     TECHNIQUE: Axial images of the brain are obtained without IV contrast.     FINDINGS:  The ventricles are normal in size and configuration. There is  no intracranial hemorrhage or mass effect. There are no acute signs of  ischemia based on CT imaging. No extra-axial hematoma or subarachnoid  hemorrhage.     No air-fluid levels within the visible paranasal sinuses. Mastoid air  cells well-aerated. The bony calvarium appears intact.       Impression:      1. No acute intracranial abnormality identified. No intracranial  hemorrhage. No acute signs of ischemia based on CT exam.     Comments: Findings called to Magda in the ER at 5:26 PM on 10/14/2022  This report was finalized on 10/14/2022 17:26 by Dr. Letitia Cornejo MD.    CT Angiogram Head w AI Analysis of LVO [[id number]] Resulted: 10/14/22 1720     Updated: 10/14/22 1729    CT Angiogram Neck [[id number]] Resulted: 10/14/22 1720     Updated: 10/14/22 1729            Assessment/Plan     Hospital Problem List      * No active hospital problems.  *      Impression    • Acute onset left-sided numbness and mild weakness  • Probable right thalamic CVA  • Diabetes mellitus  • Hypertension, essential    Plan    · CT and CTA of the head & neck is reviewed with the patient and his wife at bedside.  We discussed the potential of administration of tPA and the patient has declined administration of tPA.  Although this could be considered, the patient's NIHSS is only 2.  As his deficits at this time are predominantly sensory, he elected not to proceed and I am in agreement with this.  · MRI brain without  · 2D echocardiogram with bubble study  · Cardiac telemetry  · SCDs  · Fasting lipid panel  · Hemoglobin A1c  · Tighter blood pressure less than 140/90, but may allow for permissive hypertension, initially.  · PT, OT & ST evaluations, however, patient likely has no therapy needs.  · Continue aspirin 81 mg daily  · Atorvastatin 80 mg daily.  Patient is presently on WelChol 625 mg 3 tablets twice daily.    Thank you, Dr. Morin, for the consultation and the opportunity to participate in care of your patient.        Harsh Simmons PA-C  10/14/22  17:38 CDT        Electronically signed by Jeff Chisholm MD at 10/15/22 1109          Discharge Summary      Laurie Munguia APRN at 10/15/22 1220     Attestation signed by Shaun Cortez MD at 10/16/22 1457    I have reviewed this documentation and agree.    Electronically signed by Shaun Cortez MD, 10/16/22, 14:57 CDT.                          [facility]  DISCHARGE SUMMARY       Date of Admission: 10/14/2022  Date of Discharge:  10/15/2022  Primary Care Physician: Davi Gandhi MD    Presenting Problem/Chief Complaint:  Left-sided numbness and weakness    Final Discharge Diagnoses:  Active Hospital Problems    Diagnosis    • **Left sided numbness    • Type 2 diabetes mellitus with hyperglycemia (HCC)    • Gastroesophageal reflux disease       Added automatically from request for surgery [id number]     • Hyperlipidemia-welchlor    • Hypertension    • Class 1 obesity in adult        Consults: Dr. Chisholm with neurology.    Procedures Performed: None.    Pertinent Test Results:   Results for orders placed during the hospital encounter of 10/14/22    Adult Transthoracic Echo Complete W/ Cont if Necessary Per Protocol (With Agitated Saline)    Interpretation Summary  •  Left ventricular ejection fraction appears to be 46 - 50%. Left ventricular systolic function is mildly decreased.  •  Left ventricular diastolic function was normal.  •  Saline test results are negative.  •  Estimated right ventricular systolic pressure from tricuspid regurgitation is normal (<35 mmHg).  •  Mild dilation of the aortic root is present.      Imaging Results (All)     Procedure Component Value Units Date/Time    MRI Brain Without Contrast [[id number]] Collected: 10/15/22 1123     Updated: 10/15/22 1133    Narrative:      EXAMINATION:  MRI BRAIN WO CONTRAST-  10/15/2022 10:35 AM CDT     HISTORY: CODE STROKE. Left-sided weakness and numbness. Possible right  thalamic CVA; I63.9-Cerebral infarction, unspecified.     TECHNIQUE: Multiplanar imaging was performed in a high field magnet.     COMPARISON: No comparison study.     FINDINGS: No structural abnormalities are appreciated. The ventricular  system is nondilated. There are scattered areas of T2 high signal within  the hemispheric white matter. No mass lesions are identified. There are  no areas of signal abnormality on the diffusion weighted sequence.       Impression:      1. No evidence of acute infarct.  2. Scattered areas of T2 high signal within the hemispheric white matter  are nonspecific and likely due to chronic small vessel disease.     Results were called to [place], the charge nurse on 3A at 11:28 AM.        This report was finalized on 10/15/2022 11:29 by Dr. Kavin Salinas MD.    XR Chest 1 View [[id number]] Collected:  10/14/22 1816     Updated: 10/14/22 1820    Narrative:      HISTORY: Acute stroke protocol, left-sided weakness     CXR: Frontal view the chest obtained.     COMPARISON: None     FINDINGS: Density adjacent the left cardiac apex favorable for  epicardial fat. Lungs are free of consolidation, collapse, edema. Heart  is upper limits of normal in size. No pleural effusion or pneumothorax.  No acute regional bony pathology.       Impression:      1. No acute radiographic cardiopulmonary process.  This report was finalized on 10/14/2022 18:16 by Dr. Letitia Cornejo MD.    CT Angiogram Head w AI Analysis of LVO [[id number]] Collected: 10/14/22 1746     Updated: 10/14/22 1752    Narrative:      CT ANGIOGRAM HEAD W AI ANALYSIS OF LVO- 10/14/2022 5:18 PM CDT     HISTORY: Stroke, follow up; left-sided weakness     COMPARISON: None     DOSE LENGTH PRODUCT: 196 mGy cm. Automated exposure control was also  utilized to decrease patient radiation dose.     TECHNIQUE: Axial images of brain are obtained following IV contrast. 2-D  and maximal intensity projection images are reviewed. AI analysis of LVO  was utilized.        FINDINGS:  Minimal calcification considered within the cavernous  segments of the distal internal carotid arteries creating less than 25%  stenosis. 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  Bilateral anterior middle cerebral arteries are patent and. Hypoplastic  right vertebral artery with the basilar artery supplied by the left  vertebral artery. Mild calcified plaque of the distal left vertebral  artery creating less than 50% stenosis. There is artery slightly small  in caliber with no focal high-grade stenosis. Bilateral posterior  cerebral arteries are patent. No large vessel occlusion. No aneurysm or  dissection. No abnormal intracranial enhancement.       Impression:      1. No large vessel occlusion. No aneurysm or dissection.  This report was finalized on 10/14/2022 17:49 by   Letitia Cornejo MD.    CT Angiogram Neck [[id number]] Collected: 10/14/22 1742     Updated: 10/14/22 1748    Narrative:      CT ANGIOGRAM NECK- 10/14/2022 5:18 PM CDT     HISTORY: Stroke, follow up; left-sided weakness     COMPARISON: None     DOSE LENGTH PRODUCT: 196 mGy cm. Automated exposure control was also  utilized to decrease patient radiation dose.     TECHNIQUE: Axial images the neck are obtained following IV contrast. 2-D  and maximal intensity projection images are reviewed.     FINDINGS:  Mild calcification of the arch of the thoracic aorta. Bovine  variant to the arch with a common origin the right brachiocephalic and  left common carotid arteries. Right brachiocephalic artery is patent. No  subclavian artery stenosis identified. The bilateral common, internal,  and external carotid arteries are widely patent. Vertebral arteries  originate from the proximal subclavian arteries as expected. Prominent  hypoplasia right vertebral artery with dominant left vertebral artery.  Mild calcified plaque of the distal left vertebral artery creating less  than 30% stenosis. Basilar artery is supplied by the left vertebral  artery. No focal high-grade stenosis. No aneurysm or dissection.     Mosaic appearance to the visible upper lobe parenchyma may be seen with  air trapping. No apical pneumothorax. No pathologic lymphadenopathy or  soft tissue mass identified within the visible plaque. Slight  exaggerated lordosis of the cervical spine.       Impression:      1. Widely patent bilateral extracranial carotid arteries. 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 Basilar artery supplied by the  left vertebral artery. No focal high-grade stenosis. No aneurysm or  dissection.  This report was finalized on 10/14/2022 17:45 by Dr. Letitia Cornejo MD.    CT Head Without Contrast Stroke Protocol [[id number]] Collected: 10/14/22 1723     Updated: 10/14/22 1729    Narrative:      CT HEAD WO CONTRAST STROKE PROTOCOL-  10/14/2022 5:17 PM CDT     HISTORY: Neuro deficit, acute, stroke suspected, left-sided weakness  with numbness     COMPARISON: None     DOSE LENGTH PRODUCT: 696 mGy cm. Automated exposure control was also  utilized to decrease patient radiation dose.     TECHNIQUE: Axial images of the brain are obtained without IV contrast.     FINDINGS:  The ventricles are normal in size and configuration. There is  no intracranial hemorrhage or mass effect. There are no acute signs of  ischemia based on CT imaging. No extra-axial hematoma or subarachnoid  hemorrhage.     No air-fluid levels within the visible paranasal sinuses. Mastoid air  cells well-aerated. The bony calvarium appears intact.       Impression:      1. No acute intracranial abnormality identified. No intracranial  hemorrhage. No acute signs of ischemia based on CT exam.     Comments: Findings called to Magda in the ER at 5:26 PM on 10/14/2022  This report was finalized on 10/14/2022 17:26 by Dr. Letitia Cornejo MD.          LAB RESULTS:      Lab 10/14/22  1719   WBC 7.74   HEMOGLOBIN 16.9   HEMATOCRIT 47.5   PLATELETS 182   NEUTROS ABS 4.13   IMMATURE GRANS (ABS) 0.02   LYMPHS ABS 2.39   MONOS ABS 0.78   EOS ABS 0.39   MCV 86.4   PROTIME 12.4   APTT 31.0         Lab 10/15/22  0413 10/14/22  1719   SODIUM  --  136   POTASSIUM  --  4.6   CHLORIDE  --  100   CO2  --  22.0   ANION GAP  --  14.0   BUN  --  18   CREATININE  --  1.19   EGFR  --  69.9   GLUCOSE  --  206*   CALCIUM  --  9.6   HEMOGLOBIN A1C 8.70*  --          Lab 10/14/22  1719   TOTAL PROTEIN 7.6   ALBUMIN 4.50   GLOBULIN 3.1   ALT (SGPT) 22   AST (SGOT) 26   BILIRUBIN 0.3   ALK PHOS 109         Lab 10/14/22  1719   TROPONIN T <0.010   PROTIME 12.4   INR 0.96         Lab 10/15/22  0413   CHOLESTEROL 126   LDL CHOL 76   HDL CHOL 33*   TRIGLYCERIDES 85         Lab 10/14/22  1719   ABO TYPING A   RH TYPING Positive   ANTIBODY SCREEN Negative         Brief Urine Lab Results     None        Microbiology  Results (last 10 days)     ** No results found for the last 240 hours. **          Chief Complaint on Day of Discharge: Denies any acute complaints.  He feels ready for discharge.    Hospital Course:  The patient is a 62 y.o. male who presented to [facility] with left-sided numbness and weakness.  He has a past medical history significant for insulin-dependent diabetes, hypertension, and gastroesophageal reflux disease.  He follows with Dr. Gandhi for his primary care.  Patient has not been to his PCP in over a year due to COVID pandemic.  On 10/14 he was with his family at a [employer] patch when he noticed he had difficulty with sensation of the left arm and left leg with some weakness of the left leg.  He does have known diabetic peripheral neuropathy that affects both lower extremities.  In the ED, stat CT of the head negative for acute intracranial process. CT of the head and neck was negative for large vessel occlusion, but did demonstrate mild, diffuse atheromatous disease and a very diminutive right vertebral artery and a left dominant vertebral system.  The patient was not felt to be a candidate for tPA administration as his symptoms were predominantly sensory and NIHSS 2.  Patient elected not to proceed.  He was admitted to the hospitalist service for further evaluation and management.    He was seen in consultation by Dr. Chisholm.  MRI brain showed no evidence of acute infarct.  LDL 76.  Continue aspirin 81 mg daily.  He is okay for discharge from neurology standpoint.    PT/OT/SLP have evaluated.  He has no needs.    A1c 8.70.  A1c in September 2021 was 10.4.  Needs improved glycemic control with target hemoglobin A1c less than 7.  He does have an insulin meter at home and is compliant with taking his medications as prescribed.  Patient states that he seldomly checks his blood glucose at home.  Registered dietitian has met with patient today.    Transthoracic echocardiogram showed mildly  "decreased ejection fraction 46-50%.  He does take lisinopril at home.  He has had a labile blood pressure.  Recommend to check blood pressure at home and keep a log of readings to bring to follow-up appointment with primary care provider.  Could consider low-dose beta-blocker as outpatient if blood pressure will allow it.    All labs reviewed.  Home medication reviewed and resumed as appropriate.  He is medically stable for discharge home.    Condition on Discharge:  Medically stable.    Physical Exam on Discharge:  /71 (BP Location: Right arm, Patient Position: Sitting)   Pulse 90   Temp 97.4 °F (36.3 °C) (Oral)   Resp 18   Ht 180.3 cm (71\")   Wt 98 kg (216 lb)   SpO2 97%   BMI 30.13 kg/m²   Physical Exam  Vitals reviewed.   Constitutional:       General: He is not in acute distress.     Appearance: He is obese. He is not toxic-appearing.      Comments: Up in chair.  Multiple family members at bedside.  No acute distress.  On room air.   HENT:      Head: Normocephalic and atraumatic.      Mouth/Throat:      Mouth: Mucous membranes are moist.      Pharynx: Oropharynx is clear.   Eyes:      Extraocular Movements: Extraocular movements intact.      Conjunctiva/sclera: Conjunctivae normal.      Pupils: Pupils are equal, round, and reactive to light.   Cardiovascular:      Rate and Rhythm: Normal rate and regular rhythm.      Pulses: Normal pulses.      Comments: Normal sinus rhythm 75-81 bpm  Pulmonary:      Effort: Pulmonary effort is normal. No respiratory distress.      Breath sounds: Normal breath sounds. No wheezing.   Abdominal:      General: Bowel sounds are normal. There is no distension.      Palpations: Abdomen is soft.      Tenderness: There is no abdominal tenderness.   Musculoskeletal:         General: No swelling or tenderness. Normal range of motion.      Cervical back: Normal range of motion and neck supple. No muscular tenderness.   Skin:     General: Skin is warm and dry.      Findings: " No erythema or rash.   Neurological:      General: No focal deficit present.      Mental Status: He is alert and oriented to person, place, and time.      Cranial Nerves: No cranial nerve deficit.      Motor: No weakness.   Psychiatric:         Mood and Affect: Mood normal.         Behavior: Behavior normal.       Discharge Disposition:  Home or Self Care    Discharge Medications:     Discharge Medications      Continue These Medications      Instructions Start Date   aspirin 81 MG EC tablet  Notes to patient: Next dose due in am 10-16   81 mg, Oral, Daily      B-D UF III MINI PEN NEEDLES 31G X 5 MM misc  Generic drug: Insulin Pen Needle  Notes to patient: Next dose due tonight 10-15   1 Device, Does not apply, 4 Times Daily      BASAGLAR KWIKPEN 100 UNIT/ML injection pen  Notes to patient: Next dose due in am 10-16   50 Units, Subcutaneous, Daily      colesevelam 625 MG tablet  Commonly known as: WELCHOL  Notes to patient: Next dose due tonight 10-15   TAKE 3 TABLETS BY MOUTH TWICE DAILY WITH MEALS      Dulaglutide 3 MG/0.5ML solution pen-injector  Notes to patient: Next dose due in am 10-16   3 mg, Subcutaneous, Weekly      Jardiance 25 MG tablet tablet  Generic drug: empagliflozin  Notes to patient: Next dose due in am 10-16   TAKE 1 TABLET DAILY      lisinopril 10 MG tablet  Commonly known as: PRINIVIL,ZESTRIL  Notes to patient: Next dose due in am 10-16   10 mg, Oral, Daily      metFORMIN 500 MG tablet  Commonly known as: GLUCOPHAGE  Notes to patient: Next dose due tonight 10-15   1,000 mg, Oral, 2 Times Daily With Meals      NovoLOG FlexPen 100 UNIT/ML solution pen-injector sc pen  Generic drug: insulin aspart  Notes to patient: Next dose due tonight 10-15   7 Units, Subcutaneous, 3 Times Daily With Meals      pantoprazole 40 MG EC tablet  Commonly known as: PROTONIX  Notes to patient: Next dose due in am 10-16   40 mg, Oral, Daily      tadalafil 20 MG tablet  Commonly known as: CIALIS   20 mg, Oral, Daily  PRN             Discharge Diet:   Diet Instructions     Diet: Soft Texture, Consistent Carbohydrate; Thin Liquids, No Restrictions; Whole      Discharge Diet:  Soft Texture  Consistent Carbohydrate       Fluid Consistency: Thin Liquids, No Restrictions    Soft Options: Whole          Activity at Discharge:   Activity Instructions     Activity as Tolerated            Discharge Care Plan/Instructions:   1.  Seek evaluation for worsening symptoms    Follow-up Appointments:   1.  Follow-up with Dr. Gandhi in 1 week.    Test Results Pending at Discharge: none.    Electronically signed by NICHOLAS Bob, 10/15/22, 14:02 CDT.    Time: 35 minutes.          Electronically signed by Shaun Cortez MD at 10/16/22 [id number]

## 2022-10-17 NOTE — PROGRESS NOTES
Transitional Care Follow Up Visit  Subjective     Abdullahi MACE is a 62 y.o. male who presents for a transitional care management visit.  Alone.    Within 48 business hours after discharge our office contacted him via telephone to coordinate his care and needs.      I reviewed and discussed the details of that call along with the discharge summary, hospital problems, inpatient lab results, inpatient diagnostic studies, and consultation reports with Abdullahi.     Current outpatient and discharge medications have been reconciled for the patient.    Date of TCM Phone Call 10/15/2022   [facility]   Date of Admission 10/14/2022   Date of Discharge 10/15/2022   Discharge Disposition Home or Self Care     Presenting Problem/Chief Complaint:  Left-sided numbness and weakness     Final Discharge Diagnoses:        Active Hospital Problems     Diagnosis     • **Left sided numbness     • Type 2 diabetes mellitus with hyperglycemia (HCC)     • Gastroesophageal reflux disease         Added automatically from request for surgery [id number]      • Hyperlipidemia-[facility]     • Hypertension     • Class 1 obesity in adult          Consults: Dr. Chisholm with neurology.  • Acute onset left-sided numbness and mild weakness  • Probable right thalamic CVA  • Diabetes mellitus  • Hypertension, [facility]    Added lipitor 80 to [place]  Close bp control  Close DM control    Risk for Readmission (LACE) Score: 8 (10/15/2022  5:00 AM)    History of Present Illness the day of admission was having onset of a  Feeling to the left face left chest upper abdomen and left arm.  Initial CTA in [facility], [place], operated by [facility] was unremarkable; discussed TPN but was felt since this was only sensory did not significantly meet the criteria.    Course During Hospital Stay:  [place]/MRI no CVA     The following portions of the patient's history were reviewed and updated as appropriate: allergies, current medications, past family history, past medical history,  past social history, past surgical history and problem list.       Current Outpatient Medications:   •  aspirin 81 MG EC tablet, Take 81 mg by mouth Daily., Disp: , Rfl:   •  colesevelam (WELCHOL) 625 MG tablet, TAKE 3 TABLETS BY MOUTH TWICE DAILY WITH MEALS, Disp: 540 tablet, Rfl: 1  •  Dulaglutide (Trulicity) 3 MG/0.5ML solution pen-injector, Inject 3 mg under the skin into the appropriate area as directed., Disp: , Rfl:   •  Dulaglutide 3 MG/0.5ML solution pen-injector, Inject 0.5 mL under the skin into the appropriate area as directed 1 (One) Time Per Week., Disp: 12 mL, Rfl: 3  •  insulin aspart (NovoLOG FlexPen) 100 UNIT/ML solution pen-injector sc pen, Inject 7 Units under the skin into the appropriate area as directed 3 (Three) Times a Day With Meals., Disp: 20 mL, Rfl: 3  •  Insulin Glargine (BASAGLAR KWIKPEN) 100 UNIT/ML injection pen, Inject 50 Units under the skin into the appropriate area as directed Daily for 90 days., Disp: 45 mL, Rfl: 3  •  Insulin Pen Needle (B-D UF III MINI PEN NEEDLES) 31G X 5 MM misc, 1 Device 4 (Four) Times a Day., Disp: 400 each, Rfl: 3  •  Jardiance 25 MG tablet tablet, TAKE 1 TABLET DAILY, Disp: 90 tablet, Rfl: 1  •  lisinopril (PRINIVIL,ZESTRIL) 10 MG tablet, TAKE 1 TABLET BY MOUTH DAILY, Disp: 90 tablet, Rfl: 3  •  metFORMIN (GLUCOPHAGE) 500 MG tablet, Take 2 tablets by mouth 2 (Two) Times a Day With Meals., Disp: 360 tablet, Rfl: 3  •  pantoprazole (PROTONIX) 40 MG EC tablet, Take 1 tablet by mouth Daily., Disp: 30 tablet, Rfl: 11  •  tadalafil (CIALIS) 20 MG tablet, Take 20 mg by mouth Daily As Needed for erectile dysfunction., Disp: , Rfl:      Current Outpatient Medications:   •  Dulaglutide 3 MG/0.5ML solution pen-injector, Inject 0.5 mL under the skin into the appropriate area as directed 1 (One) Time Per Week., Disp: 12 mL, Rfl: 3  •  insulin aspart (NovoLOG FlexPen) 100 UNIT/ML solution pen-injector sc pen, Inject 7 Units under the skin into the appropriate area as  directed 3 (Three) Times a Day With Meals., Disp: 20 mL, Rfl: 3  •  Insulin Glargine (BASAGLAR KWIKPEN) 100 UNIT/ML injection pen, Inject 50 Units under the skin into the appropriate area as directed Daily for 90 days., Disp: 45 mL, Rfl: 3  •  Jardiance 25 MG tablet tablet, TAKE 1 TABLET DAILY, Disp: 90 tablet, Rfl: 1  •  metFORMIN (GLUCOPHAGE) 500 MG tablet, Take 2 tablets by mouth 2 (Two) Times a Day With Meals., Disp: 360 tablet, Rfl: 3      Review of Systems  GENERAL:  Active/slower with limits, speed, stamina for age.  Sleep is ok. No fever now.  ENDO:  No syncope, near or diaphoretic sweaty spells.  BS variable;  no download noted.  HEENT: No head injury or headache.   No vision change.  No significant hearing loss.  Ears without pain/drainage.  No sore throat.  No usual significant nasal/sinus congestion/drainage; worse couple weeks.  No epistaxis.  CHEST: No chest wall tenderness or mass. No usual significant cough, without wheeze.  No SOB; no hemoptysis.  CV: No chest pain, palpitations, ankle edema.  GI: No heartburn, dysphagia.  No abdominal pain, diarrhea, constipation.  No rectal bleeding, or melena.    :  Voids without dysuria, or incontinence to completion.   ORTHO: No painful/swollen joints but various on /off sore.  No sore neck or back.  No acute neck or back pain without recent injury.   NEURO: No dizziness, weakness of extremities.    Above a new numbness/paresthesias.     PSYCH: No memory loss.  Mood good; not anxious, depressed   Screening:  Mammogram: NA  Bone density: NA  Low dose CT chest: Tobacco-smoker/never: NA  GI: Colon-nl/[facility]/6.12.17/10y  Prostate: 10.17.22 ro deferred  12.23.20  12.11.19 AUA 3/1  7.13.18  Usual lab order  6m CMP, A1c  12m CBC, CMP, A1c, LIPID, TSH, PSAs, B12, folate      Results for orders placed or performed during the hospital encounter of 10/14/22   Comprehensive Metabolic Panel    Specimen: Blood   Result Value Ref Range    Glucose 206 (H) 65 - 99 mg/dL     BUN 18 8 - 23 mg/dL    Creatinine 1.19 0.76 - 1.27 mg/dL    Sodium 136 136 - 145 mmol/L    Potassium 4.6 3.5 - 5.2 mmol/L    Chloride 100 98 - 107 mmol/L    CO2 22.0 22.0 - 29.0 mmol/L    Calcium 9.6 8.6 - 10.5 mg/dL    Total Protein 7.6 6.0 - 8.5 g/dL    Albumin 4.50 3.50 - 5.20 g/dL    ALT (SGPT) 22 1 - 41 U/L    AST (SGOT) 26 1 - 40 U/L    Alkaline Phosphatase 109 39 - 117 U/L    Total Bilirubin 0.3 0.0 - 1.2 mg/dL    Globulin 3.1 gm/dL    A/G Ratio 1.5 g/dL    BUN/Creatinine Ratio 15.1 7.0 - 25.0    Anion Gap 14.0 5.0 - 15.0 mmol/L    eGFR 69.9 >60.0 mL/min/1.73   Protime-INR    Specimen: Blood   Result Value Ref Range    Protime 12.4 11.9 - 14.6 Seconds    INR 0.96 0.91 - 1.09   aPTT    Specimen: Blood   Result Value Ref Range    PTT 31.0 24.1 - 35.0 seconds   Troponin    Specimen: Blood   Result Value Ref Range    Troponin T <0.010 0.000 - 0.030 ng/mL   CBC Auto Differential    Specimen: Blood   Result Value Ref Range    WBC 7.74 3.40 - 10.80 10*3/mm3    RBC 5.50 4.14 - 5.80 10*6/mm3    Hemoglobin 16.9 13.0 - 17.7 g/dL    Hematocrit 47.5 37.5 - 51.0 %    MCV 86.4 79.0 - 97.0 fL    MCH 30.7 26.6 - 33.0 pg    MCHC 35.6 31.5 - 35.7 g/dL    RDW 12.8 12.3 - 15.4 %    RDW-SD 40.1 37.0 - 54.0 fl    MPV 9.8 6.0 - 12.0 fL    Platelets 182 140 - 450 10*3/mm3    Neutrophil % 53.3 42.7 - 76.0 %    Lymphocyte % 30.9 19.6 - 45.3 %    Monocyte % 10.1 5.0 - 12.0 %    Eosinophil % 5.0 0.3 - 6.2 %    Basophil % 0.4 0.0 - 1.5 %    Immature Grans % 0.3 0.0 - 0.5 %    Neutrophils, Absolute 4.13 1.70 - 7.00 10*3/mm3    Lymphocytes, Absolute 2.39 0.70 - 3.10 10*3/mm3    Monocytes, Absolute 0.78 0.10 - 0.90 10*3/mm3    Eosinophils, Absolute 0.39 0.00 - 0.40 10*3/mm3    Basophils, Absolute 0.03 0.00 - 0.20 10*3/mm3    Immature Grans, Absolute 0.02 0.00 - 0.05 10*3/mm3    nRBC 0.0 0.0 - 0.2 /100 WBC   Hemoglobin A1c    Specimen: Blood   Result Value Ref Range    Hemoglobin A1C 8.70 (H) 4.80 - 5.60 %   Lipid Panel    Specimen: Blood    Result Value Ref Range    Total Cholesterol 126 0 - 200 mg/dL    Triglycerides 85 0 - 150 mg/dL    HDL Cholesterol 33 (L) 40 - 60 mg/dL    LDL Cholesterol  76 0 - 100 mg/dL    VLDL Cholesterol 17 5 - 40 mg/dL    LDL/HDL Ratio 2.30    POC Glucose Once    Specimen: Blood   Result Value Ref Range    Glucose 200 (H) 70 - 130 mg/dL   POC Glucose Once    Specimen: Blood   Result Value Ref Range    Glucose 154 (H) 70 - 130 mg/dL   POC Glucose Once    Specimen: Blood   Result Value Ref Range    Glucose 94 70 - 130 mg/dL   POC Glucose Once    Specimen: Blood   Result Value Ref Range    Glucose 239 (H) 70 - 130 mg/dL   Type & Screen    Specimen: Blood   Result Value Ref Range    ABO Type A     RH type Positive     Antibody Screen Negative     T&S Expiration Date 10/17/2022 11:59:59 PM    Green Top (Gel)   Result Value Ref Range    Extra Tube Hold for add-ons.    Lavender Top   Result Value Ref Range    Extra Tube hold for add-on    Red Top   Result Value Ref Range    Extra Tube Hold for add-ons.    Light Blue Top   Result Value Ref Range    Extra Tube Hold for add-ons.        Lab Results   Component Value Date    PSA 0.677 12/21/2020    PSA 0.488 12/04/2019    PSA 0.518 07/11/2018        Lab Results:  CBC:  Lab Results - Last 18 Months   Lab Units 10/14/22  1719   WBC 10*3/mm3 7.74   HEMOGLOBIN g/dL 16.9   HEMATOCRIT % 47.5   PLATELETS 10*3/mm3 182      BMP/CMP:  Lab Results - Last 18 Months   Lab Units 10/14/22  1719 09/16/21  0742 06/21/21  0710   SODIUM mmol/L 136 135* 140   POTASSIUM mmol/L 4.6 4.6 4.6   CHLORIDE mmol/L 100 100 103   TOTAL CO2 mmol/L  --  25.8 25.5   CO2 mmol/L 22.0  --   --    BUN mg/dL 18 19 22*   CREATININE mg/dL 1.19 1.33* 1.36*   EGFR IF NONAFRICN AM mL/min/1.73  --  55* 54*   EGFR IF AFRICN AM mL/min/1.73  --  67 65   CALCIUM mg/dL 9.6 10.0 9.5     HEPATIC:  Lab Results - Last 18 Months   Lab Units 10/14/22  1719 09/16/21  0742 06/21/21  0710   ALT (SGPT) U/L 22 14 19   AST (SGOT) U/L 26 17 17    ALK PHOS U/L 109 114 107     THYROID:No results for input(s): TSH, T3FREE, FREET4, FTI in the last 51010 hours.    Invalid input(s): T3, T4, TEUP  A1C:  Lab Results - Last 18 Months   Lab Units 10/15/22  0413 09/16/21  0742 06/21/21  0710   HEMOGLOBIN A1C % 8.70* 10.40* 10.10*     PSA:No results for input(s): PSA in the last 75412 hours.    BS: 8.7 10.15.22; down 9.16.21 10.4  LIPID: 88 LDL 12.21.20  PSA: ok 12.21.20  CBC: ok 10.15.22  Renal: ok 10.15.22  Liver: ok 10.14.22  Vit D: NA  Thyroid: TSH ok 12.21.20    CT Angiogram Neck    Result Date: 10/14/2022  1. Widely patent bilateral extracranial carotid arteries. 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 Basilar artery supplied by the left vertebral artery. No focal high-grade stenosis. No aneurysm or dissection. This report was finalized on 10/14/2022 17:45 by Dr. Letitia Cornejo MD.    MRI Brain Without Contrast    Result Date: 10/15/2022  1. No evidence of acute infarct. 2. Scattered areas of T2 high signal within the hemispheric white matter are nonspecific and likely due to chronic small vessel disease.  Results were called to [place], the charge nurse on 3A at 11:28 AM.   This report was finalized on 10/15/2022 11:29 by Dr. Kavin Salinas MD.    XR Chest 1 View    Result Date: 10/14/2022  1. No acute radiographic cardiopulmonary process. This report was finalized on 10/14/2022 18:16 by Dr. Letitia Cornejo MD.    CT Head Without Contrast Stroke Protocol    Result Date: 10/14/2022  1. No acute intracranial abnormality identified. No intracranial hemorrhage. No acute signs of ischemia based on CT exam.  Comments: Findings called to Magda in the ER at 5:26 PM on 10/14/2022 This report was finalized on 10/14/2022 17:26 by Dr. Letitia Cornejo MD.    CT Angiogram Head w AI Analysis of LVO    Result Date: 10/14/2022  1. No large vessel occlusion. No aneurysm or dissection. This report was finalized on 10/14/2022 17:49 by Dr. Letitia Cornejo,  "MD.    Results for orders placed during the hospital encounter of 10/14/22    Adult Transthoracic Echo Complete W/ Cont if Necessary Per Protocol (With Agitated Saline)    Interpretation Summary  •  Left ventricular ejection fraction appears to be 46 - 50%. Left ventricular systolic function is mildly decreased.  •  Left ventricular diastolic function was normal.  •  Saline test results are negative.  •  Estimated right ventricular systolic pressure from tricuspid regurgitation is normal (<35 mmHg).  •  Mild dilation of the aortic root is present.    Objective   /68 (BP Location: Right arm, Patient Position: Sitting, Cuff Size: Adult)   Pulse 90   Temp 96.9 °F (36.1 °C) (Infrared)   Resp 18   Ht 180.3 cm (71\")   Wt 97.2 kg (214 lb 3.2 oz)   SpO2 99%   BMI 29.87 kg/m²   Body mass index is 29.87 kg/m².    Physical Exam  GENERAL:  Well nourished/developed in no acute distress. Obese   SKIN: Turgor excellent, without wound, rash, lesion    HEENT: Normal cephalic without trauma.  Pupils equal round reactive to light. Extraocular motions full without nystagmus.   External canals nonobstructive nontender without reddness. Tymphatic membranes vale with ottoniel structures intact.   Oral cavity without growths, exudates, and moist.  Posterior pharynx without mass, obstruction, redness.  No thyromegaly, mass, tenderness, lymphadenopathy and supple.  CV: Regular rhythm.  No murmur, gallop,  edema. Posterior pulses intact/weakly.  No carotid bruits.  CHEST: No chest wall tenderness or mass.   LUNGS: Symmetric motion with clear to auscultation.   ABD: Soft, nontender without mass.    ORTHO: Symmetric extremities without swelling/point tenderness.  Full gross range of motion.  NEURO: CN 2-12 grossly intact.  Symmetric facies. 1/4 x bicep knee equal reflexes.  UE/LE   3-4/5 strength throughout.  Nonfocal use extremities. Speech clear.     PSYCH: Oriented x 3.  Pleasant calm, well kept.  Purposeful/directed conservation " with intact short/long gross memory    Wt Readings from Last 15 Encounters:   10/17/22 1504 97.2 kg (214 lb 3.2 oz)   10/15/22 1055 98 kg (216 lb)   10/14/22 2034 95.5 kg (210 lb 8 oz)   10/14/22 1709 98 kg (216 lb)   10/14/22 1828 97.5 kg (215 lb)   09/19/22 1117 97.5 kg (215 lb)   09/08/22 0833 96.6 kg (213 lb)   09/20/21 1038 91.6 kg (202 lb)   06/23/21 0930 90.7 kg (200 lb)   12/23/20 0902 91.8 kg (202 lb 6.4 oz)   12/11/19 0905 88 kg (194 lb)   11/12/19 1051 88.5 kg (195 lb)   11/04/19 0933 88.5 kg (195 lb)   10/21/19 0932 88.9 kg (196 lb)   10/07/19 0921 88.9 kg (196 lb)   09/30/19 0904 88.5 kg (195 lb)   09/23/19 0921 89.4 kg (197 lb)       Assessment & Plan     1. Primary hypertension    2. Mixed hyperlipidemia    3. Type 2 diabetes mellitus with hyperglycemia, with long-term current use of insulin (HCC)    4. Suspected cerebrovascular accident (CVA)    5. Left sided numbness    6. Anticoagulated: CVA/ASA 81      Rx: reviewed/changes:  No orders of the defined types were placed in this encounter.    LAB/Testing/Referrals: reviewed/orders:   Today: None  No orders of the defined types were placed in this encounter.    Usual:   Same    BS: 8.7 10.15.22; down 9.16.21 10.4  LIPID: 88 LDL 12.21.20  PSA: ok 12.21.20  CBC: ok 10.15.22  Renal: ok 10.15.22  Liver: ok 10.14.22  Vit D: NA  Thyroid: TSH ok 12.21.20    Discussions:   Needs to tighten attention to diet, exercise, weight  Options new Rx-increase truliicty 3 to 4.5  Usually hold non-emergent testing 6m after CVA; noting however only EGD    Body mass index is 29.87 kg/m².   BMI is >= 30 and <35. (Class 1 Obesity). The following options were offered after discussion;: exercise counseling/recommendations and nutrition counseling/recommendations  Non-smoker  Abdullahi RODRIGUEZ NAKITA  reports that he has never smoked. He has never used smokeless tobacco..  There are no Patient Instructions on file for this visit.    Follow up: No follow-ups on file.  No future  appointments.           Current outpatient and discharge medications have been reconciled for the patient.    Transitional Care Management Certification  I certify that the following are true:  1. Communication was not medically needed prior to appointment because face to face visit occurred within two business days of discharge.  2. Complexity of Medical Decision Making is high.  3. Face to face visit occurred within 2 days.    *Note: [id number] is for high complexity patients with a face to face visit within 7 days of discharge.  [id number] is for high complexity patients with a face to face on days 8-14 post discharge or medium complexity with face to face visit within 14 days post discharge.

## 2022-10-17 NOTE — OUTREACH NOTE
Call Center TCM Note    Flowsheet Row Responses   [facility] patient discharged from? [place]   Does the patient have one of the following disease processes/diagnoses(primary or secondary)? Other   TCM attempt successful? Yes  [Patient being seen in the office at present time. 10/17/2022]   Call start time 1516   Call end time 1517   Discharge diagnosis L sided numbness and weakness ( Neuro cleared patient no evidence of CVA)   TCM call completed? Yes          Buzz Monreal RN    10/17/2022, 15:17 CDT

## 2022-10-25 NOTE — PAYOR COMM NOTE
"FROM: AMARA OROZCO  PHONE: [phone]  FAX: [phone]    PENDING: [id number]    Rafa MACE (62 y.o. Male)     Date of Birth   1960    Social Security Number       Address   [address] [place] [address]    Home Phone   [phone]    MRN   [id number]       Amish   Anglican    Marital Status                               Admission Date   10/14/22    Admission Type   Emergency    Admitting Provider       Attending Provider       Department, Room/Bed   [facility] 3A, 350/1       Discharge Date   10/15/2022    Discharge Disposition   Home or Self Care    Discharge Destination                               Attending Provider: (none)   Allergies: No Known Allergies    Isolation: None   Infection: None   Code Status: Prior    Ht: 180.3 cm (71\")   Wt: 98 kg (216 lb)    Admission Cmt: None   Principal Problem: Left sided numbness [R20.0]                 Active Insurance as of 10/14/2022     Primary Coverage     Payor Plan Insurance Group Employer/Plan Group    [id number]     Payor Plan Address Payor Plan Phone Number Payor Plan Fax Number Effective Dates    [address] [phone]  7/1/2021 - None Entered    [place] [address]       Subscriber Name Subscriber Birth Date Member ID       RAFA MACE 1960 [id number]                 Emergency Contacts      (Rel.) Home Phone Work Phone Mobile Phone    Arlet Mace (Spouse) [phone] -- --               History & Physical      Shaun Cortez MD at 10/14/22 1812              [facility]  HISTORY AND PHYSICAL    Date of Admission: 10/14/2022  Primary Care Physician: Davi Gandhi MD    Subjective     Chief Complaint: Left sided numbness and weakness    History of Present Illness  Patient is a 62-year-old  male with past medical history significant for insulin-dependent diabetes, hypertension, " "gastroesophageal reflux disease the presented to our hospital given concern for strokelike symptoms.  Patient reports that he was out at a local pumpkin patch with his grandchildren, and reports that he was trying to gather one of his grandkids back to their car as it was time to leave.  He states that he had onset of left-sided numbness.  He describes numbness that was on the left side of his face and extending all the way down his left upper extremity and left lower extremity.  He reports that he was able to walk, but states \"I really had to think about it.\"  He denied any speech difficulty.  He denied any headache.  Denied any vision change.  He reported that his left side did feel a little bit heavy and weak.  He feels like that his symptoms have improved since being in the emergency department, but he also states that he is not back to normal.    Neurology has already evaluated the patient in the emergency department.  I discussion did take place regarding consideration of tPA-this mode of therapy was not pursued.    Patient does report that he takes a baby aspirin nightly on an outpatient basis.  He does not smoke.  He denies any recent medication adjustments.  Denies any chest pain or chest pressure.  Denies any shortness of breath.    Review of Systems     Otherwise complete ROS reviewed and negative except as mentioned in the HPI.    Past Medical History:   Past Medical History:   Diagnosis Date   • Cerebrovascular accident (CVA), unspecified mechanism (HCC) 10/14/2022   • Diabetes mellitus (HCC)    • Hypertension    • Premature ventricular beats      Past Surgical History:  Past Surgical History:   Procedure Laterality Date   • APPENDECTOMY     • COLONOSCOPY  10/22/2013   • COLONOSCOPY N/A 9/19/2022    Procedure: COLONOSCOPY WITH ANESTHESIA;  Surgeon: Rian Nobles MD;  Location: [facility] ENDOSCOPY;  Service: Gastroenterology;  Laterality: N/A;  preop; hx of polyps  postop; hemmhroids   PCP Davi Gandhi "    • ENDOSCOPY N/A 9/19/2022    Procedure: ESOPHAGOGASTRODUODENOSCOPY WITH ANESTHESIA;  Surgeon: Rian Nobles MD;  Location: [facility] ENDOSCOPY;  Service: Gastroenterology;  Laterality: N/A;  preop; dysphagia  postop; r/o barretts; esophagitis   PCP Davi Gandhi      Social History:  reports that he has never smoked. He has never used smokeless tobacco. He reports that he does not drink alcohol and does not use drugs.    Family History: family history includes Colon cancer in his brother; Diabetes in his mother; Heart disease in his father; Stroke in his mother.       Allergies:  No Known Allergies    Medications:  Prior to Admission medications    Medication Sig Start Date End Date Taking? Authorizing Provider   Insulin Glargine (BASAGLAR KWIKPEN) 100 UNIT/ML injection pen Inject 50 Units under the skin into the appropriate area as directed Daily for 90 days. 10/14/22 1/12/23  Zack Copeland APRN   aspirin 81 MG EC tablet Take 81 mg by mouth Daily.    Provider, MD Oleksandr   colesevelam (WELCHOL) 625 MG tablet TAKE 3 TABLETS BY MOUTH TWICE DAILY WITH MEALS 1/12/22   Zack Copeland APRN   Dulaglutide 3 MG/0.5ML solution pen-injector Inject 3 mg under the skin into the appropriate area as directed 1 (One) Time Per Week. 12/28/20   Davi Gandhi MD   insulin aspart (NovoLOG FlexPen) 100 UNIT/ML solution pen-injector sc pen Inject 7 Units under the skin into the appropriate area as directed 3 (Three) Times a Day With Meals. 9/21/21   Davi Gandhi MD   Insulin Pen Needle (B-D UF III MINI PEN NEEDLES) 31G X 5 MM misc 1 Device 4 (Four) Times a Day. 9/21/21   Davi Gandhi MD   Jardiance 25 MG tablet tablet TAKE 1 TABLET DAILY 9/12/22   Davi Gandhi MD   lisinopril (PRINIVIL,ZESTRIL) 10 MG tablet TAKE 1 TABLET BY MOUTH DAILY 8/2/22   Davi Gandhi MD   metFORMIN (GLUCOPHAGE) 500 MG tablet Take 2 tablets by mouth 2 (Two) Times a Day With Meals. 8/5/21   Davi Gandhi  "MD Oliver   pantoprazole (PROTONIX) 40 MG EC tablet Take 1 tablet by mouth Daily. 9/19/22   Rian Nobles MD   tadalafil (CIALIS) 20 MG tablet Take 20 mg by mouth Daily As Needed for erectile dysfunction.    Provider, MD Oleksandr   Insulin Glargine (BASAGLAR KWIKPEN) 100 UNIT/ML injection pen INJECT 50 UNITS            SUBCUTANEOUSLY INTO        APPROPRIATE AREA DAILY AS  DIRECTED 9/12/22 10/14/22  Davi Gandhi MD     I have utilized all available immediate resources to obtain, update, and review the patient's current medications.    Objective     Vital Signs: /85   Pulse 75   Temp 98.2 °F (36.8 °C)   Resp 14   Ht 180.3 cm (71\")   Wt 98 kg (216 lb)   SpO2 98%   BMI 30.13 kg/m²   Physical Exam  Vitals reviewed.   Constitutional:       General: He is not in acute distress.     Appearance: He is not toxic-appearing.   HENT:      Head: Normocephalic.      Mouth/Throat:      Mouth: Mucous membranes are moist.   Eyes:      Pupils: Pupils are equal, round, and reactive to light.   Cardiovascular:      Rate and Rhythm: Normal rate and regular rhythm.   Pulmonary:      Effort: Pulmonary effort is normal. No respiratory distress.      Breath sounds: No wheezing or rales.   Abdominal:      Palpations: Abdomen is soft.   Musculoskeletal:         General: No swelling.   Skin:     General: Skin is warm.      Capillary Refill: Capillary refill takes less than 2 seconds.   Neurological:      Mental Status: He is alert and oriented to person, place, and time.      Cranial Nerves: No cranial nerve deficit.      Comments: Face symmetric; speech fluent.  ? atypical findings on motor assessment of left upper extremity - sputtering symptoms   Psychiatric:         Mood and Affect: Mood normal.          Results Reviewed:  Lab Results (last 24 hours)     Procedure Component Value Units Date/Time    Comprehensive Metabolic Panel [[id number]]  (Abnormal) Collected: 10/14/22 8691    Specimen: Blood Updated: " 10/14/22 1754     Glucose 206 mg/dL      BUN 18 mg/dL      Creatinine 1.19 mg/dL      Sodium 136 mmol/L      Potassium 4.6 mmol/L      Comment: Slight hemolysis detected by analyzer. Results may be affected.        Chloride 100 mmol/L      CO2 22.0 mmol/L      Calcium 9.6 mg/dL      Total Protein 7.6 g/dL      Albumin 4.50 g/dL      ALT (SGPT) 22 U/L      AST (SGOT) 26 U/L      Comment: Slight hemolysis detected by analyzer. Results may be affected.        Alkaline Phosphatase 109 U/L      Total Bilirubin 0.3 mg/dL      Globulin 3.1 gm/dL      A/G Ratio 1.5 g/dL      BUN/Creatinine Ratio 15.1     Anion Gap 14.0 mmol/L      eGFR 69.9 mL/min/1.73      Comment: National Kidney Foundation and American Society of Nephrology (ASN) Task Force recommended calculation based on the Chronic Kidney Disease Epidemiology Collaboration (CKD-EPI) equation refit without adjustment for race.       Narrative:      GFR Normal >60  Chronic Kidney Disease <60  Kidney Failure <15      Troponin [[id number]]  (Normal) Collected: 10/14/22 1719    Specimen: Blood Updated: 10/14/22 1752     Troponin T <0.010 ng/mL     Narrative:      Troponin T Reference Range:  <= 0.03 ng/mL-   Negative for AMI  >0.03 ng/mL-     Abnormal for myocardial necrosis.  Clinicians would have to utilize clinical acumen, EKG, Troponin and serial changes to determine if it is an Acute Myocardial Infarction or myocardial injury due to an underlying chronic condition.       Results may be falsely decreased if patient taking Biotin.      Protime-INR [[id number]]  (Normal) Collected: 10/14/22 1719    Specimen: Blood Updated: 10/14/22 1745     Protime 12.4 Seconds      INR 0.96    aPTT [[id number]]  (Normal) Collected: 10/14/22 1719    Specimen: Blood Updated: 10/14/22 1745     PTT 31.0 seconds     POC Glucose Once [[id number]]  (Abnormal) Collected: 10/14/22 1724    Specimen: Blood Updated: 10/14/22 1737     Glucose 200 mg/dL      Comment: : [id number] Samir  Donna ID: [id number]       CBC & Differential [[id number]]  (Normal) Collected: 10/14/22 1719    Specimen: Blood Updated: 10/14/22 1734    Narrative:      The following orders were created for panel order CBC & Differential.  Procedure                               Abnormality         Status                     ---------                               -----------         ------                     CBC Auto Differential[[id number]]        Normal              Final result                 Please view results for these tests on the individual orders.    CBC Auto Differential [[id number]]  (Normal) Collected: 10/14/22 1719    Specimen: Blood Updated: 10/14/22 1734     WBC 7.74 10*3/mm3      RBC 5.50 10*6/mm3      Hemoglobin 16.9 g/dL      Hematocrit 47.5 %      MCV 86.4 fL      MCH 30.7 pg      MCHC 35.6 g/dL      RDW 12.8 %      RDW-SD 40.1 fl      MPV 9.8 fL      Platelets 182 10*3/mm3      Neutrophil % 53.3 %      Lymphocyte % 30.9 %      Monocyte % 10.1 %      Eosinophil % 5.0 %      Basophil % 0.4 %      Immature Grans % 0.3 %      Neutrophils, Absolute 4.13 10*3/mm3      Lymphocytes, Absolute 2.39 10*3/mm3      Monocytes, Absolute 0.78 10*3/mm3      Eosinophils, Absolute 0.39 10*3/mm3      Basophils, Absolute 0.03 10*3/mm3      Immature Grans, Absolute 0.02 10*3/mm3      nRBC 0.0 /100 WBC     Lavender Top [[id number]] Collected: 10/14/22 1719    Specimen: Blood Updated: 10/14/22 1730    Light Blue Top [[id number]] Collected: 10/14/22 1719    Specimen: Blood Updated: 10/14/22 1730    Red Top [[id number]] Collected: 10/14/22 1719    Specimen: Blood Updated: 10/14/22 1730    [place] Draw [[id number]] Collected: 10/14/22 1719    Specimen: Blood Updated: 10/14/22 1730    Narrative:      The following orders were created for panel order [place] Draw.  Procedure                               Abnormality         Status                     ---------                               -----------         ------                      Green Top (Gel)[[id number]]                                  In process                 Lavender Top[[id number]]                                     In process                 Red Top[[id number]]                                          In process                 Light Blue Top[[id number]]                                   In process                   Please view results for these tests on the individual orders.    Green Top (Gel) [[id number]] Collected: 10/14/22 1719    Specimen: Blood Updated: 10/14/22 1730        Imaging Results (Last 24 Hours)     Procedure Component Value Units Date/Time    CT Angiogram Head w AI Analysis of LVO [[id number]] Collected: 10/14/22 1746     Updated: 10/14/22 1752    Narrative:      CT ANGIOGRAM HEAD W AI ANALYSIS OF LVO- 10/14/2022 5:18 PM CDT     HISTORY: Stroke, follow up; left-sided weakness     COMPARISON: None     DOSE LENGTH PRODUCT: 196 mGy cm. Automated exposure control was also  utilized to decrease patient radiation dose.     TECHNIQUE: Axial images of brain are obtained following IV contrast. 2-D  and maximal intensity projection images are reviewed. AI analysis of LVO  was utilized.        FINDINGS:  Minimal calcification considered within the cavernous  segments of the distal internal carotid arteries creating less than 25%  stenosis. 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  Bilateral anterior middle cerebral arteries are patent and. Hypoplastic  right vertebral artery with the basilar artery supplied by the left  vertebral artery. Mild calcified plaque of the distal left vertebral  artery creating less than 50% stenosis. There is artery slightly small  in caliber with no focal high-grade stenosis. Bilateral posterior  cerebral arteries are patent. No large vessel occlusion. No aneurysm or  dissection. No abnormal intracranial enhancement.       Impression:      1. No large vessel occlusion. No aneurysm or dissection.  This report was finalized on  10/14/2022 17:49 by Dr. Letitia Cornejo MD.    XR Chest 1 View [[id number]] Resulted: 10/14/22 1744     Updated: 10/14/22 1751    CT Angiogram Neck [[id number]] Collected: 10/14/22 1742     Updated: 10/14/22 1748    Narrative:      CT ANGIOGRAM NECK- 10/14/2022 5:18 PM CDT     HISTORY: Stroke, follow up; left-sided weakness     COMPARISON: None     DOSE LENGTH PRODUCT: 196 mGy cm. Automated exposure control was also  utilized to decrease patient radiation dose.     TECHNIQUE: Axial images the neck are obtained following IV contrast. 2-D  and maximal intensity projection images are reviewed.     FINDINGS:  Mild calcification of the arch of the thoracic aorta. Bovine  variant to the arch with a common origin the right brachiocephalic and  left common carotid arteries. Right brachiocephalic artery is patent. No  subclavian artery stenosis identified. The bilateral common, internal,  and external carotid arteries are widely patent. Vertebral arteries  originate from the proximal subclavian arteries as expected. Prominent  hypoplasia right vertebral artery with dominant left vertebral artery.  Mild calcified plaque of the distal left vertebral artery creating less  than 30% stenosis. Basilar artery is supplied by the left vertebral  artery. No focal high-grade stenosis. No aneurysm or dissection.     Mosaic appearance to the visible upper lobe parenchyma may be seen with  air trapping. No apical pneumothorax. No pathologic lymphadenopathy or  soft tissue mass identified within the visible plaque. Slight  exaggerated lordosis of the cervical spine.       Impression:      1. Widely patent bilateral extracranial carotid arteries. 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 Basilar artery supplied by the  left vertebral artery. No focal high-grade stenosis. No aneurysm or  dissection.  This report was finalized on 10/14/2022 17:45 by Dr. Letitia Cornejo MD.    CT Head Without Contrast Stroke Protocol [[id number]]  Collected: 10/14/22 1723     Updated: 10/14/22 1729    Narrative:      CT HEAD WO CONTRAST STROKE PROTOCOL- 10/14/2022 5:17 PM CDT     HISTORY: Neuro deficit, acute, stroke suspected, left-sided weakness  with numbness     COMPARISON: None     DOSE LENGTH PRODUCT: 696 mGy cm. Automated exposure control was also  utilized to decrease patient radiation dose.     TECHNIQUE: Axial images of the brain are obtained without IV contrast.     FINDINGS:  The ventricles are normal in size and configuration. There is  no intracranial hemorrhage or mass effect. There are no acute signs of  ischemia based on CT imaging. No extra-axial hematoma or subarachnoid  hemorrhage.     No air-fluid levels within the visible paranasal sinuses. Mastoid air  cells well-aerated. The bony calvarium appears intact.       Impression:      1. No acute intracranial abnormality identified. No intracranial  hemorrhage. No acute signs of ischemia based on CT exam.     Comments: Findings called to Magda in the ER at 5:26 PM on 10/14/2022  This report was finalized on 10/14/2022 17:26 by Dr. Letitia Cornejo MD.        I have personally reviewed and interpreted the radiology studies and ECG obtained at time of admission.     Assessment / Plan     Assessment:   Active Hospital Problems    Diagnosis    • **Cerebrovascular accident (CVA), unspecified mechanism (HCC)    • Type 2 diabetes mellitus with hyperglycemia (HCC)    • Gastroesophageal reflux disease      Added automatically from request for surgery [id number]     • Hyperlipidemia-welchlor    • Hypertension      Plan:   1.  MRI Brain  2.  Echo with bubble  3.  Telemetry monitoring  4.  Neuro has already evaluated patient; appreciate their assistance  5.  ASA/Plavix; trial of statin  6.  Check lipids and A1c  7.  Levemir 30 units QHS; insulin lispro 7 units with meals  8.  Allow for permissive HTN in the short term  9.  PT/OT/ST  10. SCDs  11.  Workup ongoing      Electronically signed by Shaun REDD  MD [place], 10/14/22, 18:17 CDT.              Electronically signed by Shaun Cortez MD at 10/14/22 1819          Emergency Department Notes      Katy Velasquez MD at 10/14/22 1716          Subjective   History of Present Illness  Patient is a 62-year-old male who presents to the ER with strokelike symptoms.  Patient states that 1 hour ago he had the sudden onset of left-sided numbness.  Patient states his arm face and leg are all involved.  Patient states his symptoms have persisted so he came here.  Patient states he is also a little weak to his left arm and leg.  He denies any recent trauma.  He denies any right-sided symptoms.  He denies any fever, chest pain, shortness of air, abdominal pain,  nausea vomiting diarrhea, urinary changes.        Review of Systems   HENT: Negative.    Eyes: Negative.    Respiratory: Negative.    Cardiovascular: Negative.    Gastrointestinal: Negative.    Endocrine: Negative.    Genitourinary: Negative.    Musculoskeletal: Negative.    Skin: Negative.    Allergic/Immunologic: Negative.    Neurological: Positive for weakness and numbness.   Hematological: Negative.    Psychiatric/Behavioral: Negative.    All other systems reviewed and are negative.      Past Medical History:   Diagnosis Date   • Cerebrovascular accident (CVA), unspecified mechanism (HCC) 10/14/2022   • Diabetes mellitus (HCC)    • Hypertension    • Premature ventricular beats        No Known Allergies    Past Surgical History:   Procedure Laterality Date   • APPENDECTOMY     • COLONOSCOPY  10/22/2013   • COLONOSCOPY N/A 9/19/2022    Procedure: COLONOSCOPY WITH ANESTHESIA;  Surgeon: Rian Nobles MD;  Location: [facility] ENDOSCOPY;  Service: Gastroenterology;  Laterality: N/A;  preop; hx of polyps  postop; hemmhroids   PCP Davi Gandhi    • ENDOSCOPY N/A 9/19/2022    Procedure: ESOPHAGOGASTRODUODENOSCOPY WITH ANESTHESIA;  Surgeon: Rian Nobles MD;  Location: [facility] ENDOSCOPY;  Service:  Gastroenterology;  Laterality: N/A;  preop; dysphagia  postop; r/o barretts; esophagitis   PCP Davi Gandhi        Family History   Problem Relation Age of Onset   • Diabetes Mother    • Stroke Mother    • Heart disease Father    • Colon cancer Brother        Social History     Socioeconomic History   • Marital status:      Spouse name: Arlet   • Number of children: 3   • Years of education: 14   Tobacco Use   • Smoking status: Never   • Smokeless tobacco: Never   Vaping Use   • Vaping Use: Never used   Substance and Sexual Activity   • Alcohol use: No   • Drug use: No   • Sexual activity: Defer           Objective   Physical Exam  Vitals and nursing note reviewed.   Constitutional:       Appearance: He is well-developed.   HENT:      Head: Normocephalic and atraumatic.   Eyes:      Conjunctiva/sclera: Conjunctivae normal.      Pupils: Pupils are equal, round, and reactive to light.   Cardiovascular:      Rate and Rhythm: Normal rate and regular rhythm.      Heart sounds: Normal heart sounds.   Pulmonary:      Effort: Pulmonary effort is normal.      Breath sounds: Normal breath sounds.   Abdominal:      Palpations: Abdomen is soft.      Tenderness: There is no abdominal tenderness.   Musculoskeletal:         General: No deformity. Normal range of motion.      Cervical back: Normal range of motion.   Skin:     General: Skin is warm.   Neurological:      Mental Status: He is alert and oriented to person, place, and time.      Cranial Nerves: Cranial nerves 2-12 are intact.      Coordination: Coordination is intact.      Comments: Decreased sensation to the left face, arm, leg.  Decreased strength to the left leg and arm.   Psychiatric:         Behavior: Behavior normal.         Procedures          ED Course          A code stroke was called.  Rob Simmons with neurology evaluated the patient.  Patient is not a tPA candidate due to low NIH stroke scale.    EKG: Sinus rhythm with a rate of 88 with PVCs, no acute  ischemia     NIH Stroke Scale/Score (NIHSS) - MDCalc  Calculated on Oct 14 2022 7:03 PM  1 points -> NIH Stroke Scale     Lab Results (last 24 hours)     Procedure Component Value Units Date/Time    CBC & Differential [[id number]]  (Normal) Collected: 10/14/22 1719    Specimen: Blood Updated: 10/14/22 1734    Narrative:      The following orders were created for panel order CBC & Differential.  Procedure                               Abnormality         Status                     ---------                               -----------         ------                     CBC Auto Differential[[id number]]        Normal              Final result                 Please view results for these tests on the individual orders.    Comprehensive Metabolic Panel [[id number]]  (Abnormal) Collected: 10/14/22 1719    Specimen: Blood Updated: 10/14/22 1754     Glucose 206 mg/dL      BUN 18 mg/dL      Creatinine 1.19 mg/dL      Sodium 136 mmol/L      Potassium 4.6 mmol/L      Comment: Slight hemolysis detected by analyzer. Results may be affected.        Chloride 100 mmol/L      CO2 22.0 mmol/L      Calcium 9.6 mg/dL      Total Protein 7.6 g/dL      Albumin 4.50 g/dL      ALT (SGPT) 22 U/L      AST (SGOT) 26 U/L      Comment: Slight hemolysis detected by analyzer. Results may be affected.        Alkaline Phosphatase 109 U/L      Total Bilirubin 0.3 mg/dL      Globulin 3.1 gm/dL      A/G Ratio 1.5 g/dL      BUN/Creatinine Ratio 15.1     Anion Gap 14.0 mmol/L      eGFR 69.9 mL/min/1.73      Comment: National Kidney Foundation and American Society of Nephrology (ASN) Task Force recommended calculation based on the Chronic Kidney Disease Epidemiology Collaboration (CKD-EPI) equation refit without adjustment for race.       Narrative:      GFR Normal >60  Chronic Kidney Disease <60  Kidney Failure <15      Protime-INR [[id number]]  (Normal) Collected: 10/14/22 1719    Specimen: Blood Updated: 10/14/22 1745     Protime 12.4 Seconds      INR 0.96     aPTT [[id number]]  (Normal) Collected: 10/14/22 1719    Specimen: Blood Updated: 10/14/22 1745     PTT 31.0 seconds     Troponin [[id number]]  (Normal) Collected: 10/14/22 1719    Specimen: Blood Updated: 10/14/22 1752     Troponin T <0.010 ng/mL     Narrative:      Troponin T Reference Range:  <= 0.03 ng/mL-   Negative for AMI  >0.03 ng/mL-     Abnormal for myocardial necrosis.  Clinicians would have to utilize clinical acumen, EKG, Troponin and serial changes to determine if it is an Acute Myocardial Infarction or myocardial injury due to an underlying chronic condition.       Results may be falsely decreased if patient taking Biotin.      CBC Auto Differential [[id number]]  (Normal) Collected: 10/14/22 1719    Specimen: Blood Updated: 10/14/22 1734     WBC 7.74 10*3/mm3      RBC 5.50 10*6/mm3      Hemoglobin 16.9 g/dL      Hematocrit 47.5 %      MCV 86.4 fL      MCH 30.7 pg      MCHC 35.6 g/dL      RDW 12.8 %      RDW-SD 40.1 fl      MPV 9.8 fL      Platelets 182 10*3/mm3      Neutrophil % 53.3 %      Lymphocyte % 30.9 %      Monocyte % 10.1 %      Eosinophil % 5.0 %      Basophil % 0.4 %      Immature Grans % 0.3 %      Neutrophils, Absolute 4.13 10*3/mm3      Lymphocytes, Absolute 2.39 10*3/mm3      Monocytes, Absolute 0.78 10*3/mm3      Eosinophils, Absolute 0.39 10*3/mm3      Basophils, Absolute 0.03 10*3/mm3      Immature Grans, Absolute 0.02 10*3/mm3      nRBC 0.0 /100 WBC     POC Glucose Once [[id number]]  (Abnormal) Collected: 10/14/22 1724    Specimen: Blood Updated: 10/14/22 1737     Glucose 200 mg/dL      Comment: : [id number] Samir Elaineer ID: [id number]           CT Angiogram Head w AI Analysis of LVO   Final Result   1. No large vessel occlusion. No aneurysm or dissection.   This report was finalized on 10/14/2022 17:49 by Dr. Letitia Cornejo MD.      CT Angiogram Neck   Final Result   1. Widely patent bilateral extracranial carotid arteries. 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 Basilar artery supplied by the   left vertebral artery. No focal high-grade stenosis. No aneurysm or   dissection.   This report was finalized on 10/14/2022 17:45 by Dr. Letitia Cornejo MD.      CT Head Without Contrast Stroke Protocol   Final Result   1. No acute intracranial abnormality identified. No intracranial   hemorrhage. No acute signs of ischemia based on CT exam.       Comments: Findings called to Magda in the ER at 5:26 PM on 10/14/2022   This report was finalized on 10/14/2022 17:26 by Dr. Letitia Cornejo MD.      XR Chest 1 View    (Results Pending)   MRI Brain Without Contrast    (Results Pending)                    Labs showed hyperglycemia.  Otherwise negative.  CT scan of the head showed no acute findings.  CTA of head and neck showed no large vessel occlusions.  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  There is no high-grade stenosis.  There is no dissection or aneurysm.  Dr. Chisholm with neurology has also evaluated the patient.  Patient was then admitted to the hospitalist service for further treatment and work-up.          MDM    Final diagnoses:   Cerebrovascular accident (CVA), unspecified mechanism (HCC)       ED Disposition  ED Disposition     ED Disposition   Decision to Admit    Condition   --    Comment   Level of Care: Telemetry [5]   Diagnosis: Cerebrovascular accident (CVA), unspecified mechanism (HCC) [[id number]]   Admitting Physician: ALLEGRA JOHNSON [[id number]]   Attending Physician: ALLEGRA JOHNSON [[id number]]   Certification: I Certify That Inpatient Hospital Services Are Medically Necessary For Greater Than 2 Midnights               No follow-up provider specified.       Medication List      No changes were made to your prescriptions during this visit.          Katy Velasquez MD  10/14/22 1804       Katy Velasquez MD  10/14/22 1812       Katy Velasquez MD  10/14/22 2040      Electronically signed by Katy Velasquez MD at 10/14/22  2040       Physician Progress Notes (last 7 days)  Notes from 10/18/22 1348 through 10/25/22 1348   No notes of this type exist for this encounter.         Consult Notes (last 7 days)  Notes from 10/18/22 through 10/25/22   No notes of this type exist for this encounter.            Discharge Summary      Laurie MunguiaNICHOLAS at 10/15/22 1220     Attestation signed by Shaun Cortez MD at 10/16/22 1457    I have reviewed this documentation and agree.    Electronically signed by Shaun Cortez MD, 10/16/22, 14:57 CDT.                          [facility] Medicine Services  DISCHARGE SUMMARY       Date of Admission: 10/14/2022  Date of Discharge:  10/15/2022  Primary Care Physician: Davi Gandhi MD    Presenting Problem/Chief Complaint:  Left-sided numbness and weakness    Final Discharge Diagnoses:  Active Hospital Problems    Diagnosis    • **Left sided numbness    • Type 2 diabetes mellitus with hyperglycemia (HCC)    • Gastroesophageal reflux disease      Added automatically from request for surgery [id number]     • Hyperlipidemia-welchlor    • Hypertension    • Class 1 obesity in adult        Consults: Dr. Chisholm with neurology.    Procedures Performed: None.    Pertinent Test Results:   Results for orders placed during the hospital encounter of 10/14/22    Adult Transthoracic Echo Complete W/ Cont if Necessary Per Protocol (With Agitated Saline)    Interpretation Summary  •  Left ventricular ejection fraction appears to be 46 - 50%. Left ventricular systolic function is mildly decreased.  •  Left ventricular diastolic function was normal.  •  Saline test results are negative.  •  Estimated right ventricular systolic pressure from tricuspid regurgitation is normal (<35 mmHg).  •  Mild dilation of the aortic root is present.      Imaging Results (All)     Procedure Component Value Units Date/Time    MRI Brain Without Contrast [[id number]] Collected: 10/15/22 1123      Updated: 10/15/22 1133    Narrative:      EXAMINATION:  MRI BRAIN WO CONTRAST-  10/15/2022 10:35 AM CDT     HISTORY: CODE STROKE. Left-sided weakness and numbness. Possible right  thalamic CVA; I63.9-Cerebral infarction, unspecified.     TECHNIQUE: Multiplanar imaging was performed in a high field magnet.     COMPARISON: No comparison study.     FINDINGS: No structural abnormalities are appreciated. The ventricular  system is nondilated. There are scattered areas of T2 high signal within  the hemispheric white matter. No mass lesions are identified. There are  no areas of signal abnormality on the diffusion weighted sequence.       Impression:      1. No evidence of acute infarct.  2. Scattered areas of T2 high signal within the hemispheric white matter  are nonspecific and likely due to chronic small vessel disease.     Results were called to [place], the charge nurse on 3A at 11:28 AM.        This report was finalized on 10/15/2022 11:29 by Dr. Kavin Salinas MD.    XR Chest 1 View [[id number]] Collected: 10/14/22 1816     Updated: 10/14/22 1820    Narrative:      HISTORY: Acute stroke protocol, left-sided weakness     CXR: Frontal view the chest obtained.     COMPARISON: None     FINDINGS: Density adjacent the left cardiac apex favorable for  epicardial fat. Lungs are free of consolidation, collapse, edema. Heart  is upper limits of normal in size. No pleural effusion or pneumothorax.  No acute regional bony pathology.       Impression:      1. No acute radiographic cardiopulmonary process.  This report was finalized on 10/14/2022 18:16 by Dr. Letitia Cornejo MD.    CT Angiogram Head w AI Analysis of LVO [[id number]] Collected: 10/14/22 1746     Updated: 10/14/22 1752    Narrative:      CT ANGIOGRAM HEAD W AI ANALYSIS OF LVO- 10/14/2022 5:18 PM CDT     HISTORY: Stroke, follow up; left-sided weakness     COMPARISON: None     DOSE LENGTH PRODUCT: 196 mGy cm. Automated exposure control was also  utilized to decrease  patient radiation dose.     TECHNIQUE: Axial images of brain are obtained following IV contrast. 2-D  and maximal intensity projection images are reviewed. AI analysis of LVO  was utilized.        FINDINGS:  Minimal calcification considered within the cavernous  segments of the distal internal carotid arteries creating less than 25%  stenosis. 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  Bilateral anterior middle cerebral arteries are patent and. Hypoplastic  right vertebral artery with the basilar artery supplied by the left  vertebral artery. Mild calcified plaque of the distal left vertebral  artery creating less than 50% stenosis. There is artery slightly small  in caliber with no focal high-grade stenosis. Bilateral posterior  cerebral arteries are patent. No large vessel occlusion. No aneurysm or  dissection. No abnormal intracranial enhancement.       Impression:      1. No large vessel occlusion. No aneurysm or dissection.  This report was finalized on 10/14/2022 17:49 by Dr. Letitia Cornejo MD.    CT Angiogram Neck [[id number]] Collected: 10/14/22 1742     Updated: 10/14/22 1748    Narrative:      CT ANGIOGRAM NECK- 10/14/2022 5:18 PM CDT     HISTORY: Stroke, follow up; left-sided weakness     COMPARISON: None     DOSE LENGTH PRODUCT: 196 mGy cm. Automated exposure control was also  utilized to decrease patient radiation dose.     TECHNIQUE: Axial images the neck are obtained following IV contrast. 2-D  and maximal intensity projection images are reviewed.     FINDINGS:  Mild calcification of the arch of the thoracic aorta. Bovine  variant to the arch with a common origin the right brachiocephalic and  left common carotid arteries. Right brachiocephalic artery is patent. No  subclavian artery stenosis identified. The bilateral common, internal,  and external carotid arteries are widely patent. Vertebral arteries  originate from the proximal subclavian arteries as expected.  Prominent  hypoplasia right vertebral artery with dominant left vertebral artery.  Mild calcified plaque of the distal left vertebral artery creating less  than 30% stenosis. Basilar artery is supplied by the left vertebral  artery. No focal high-grade stenosis. No aneurysm or dissection.     Mosaic appearance to the visible upper lobe parenchyma may be seen with  air trapping. No apical pneumothorax. No pathologic lymphadenopathy or  soft tissue mass identified within the visible plaque. Slight  exaggerated lordosis of the cervical spine.       Impression:      1. Widely patent bilateral extracranial carotid arteries. 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 Basilar artery supplied by the  left vertebral artery. No focal high-grade stenosis. No aneurysm or  dissection.  This report was finalized on 10/14/2022 17:45 by Dr. Letitia Cornejo MD.    CT Head Without Contrast Stroke Protocol [[id number]] Collected: 10/14/22 1723     Updated: 10/14/22 1729    Narrative:      CT HEAD WO CONTRAST STROKE PROTOCOL- 10/14/2022 5:17 PM CDT     HISTORY: Neuro deficit, acute, stroke suspected, left-sided weakness  with numbness     COMPARISON: None     DOSE LENGTH PRODUCT: 696 mGy cm. Automated exposure control was also  utilized to decrease patient radiation dose.     TECHNIQUE: Axial images of the brain are obtained without IV contrast.     FINDINGS:  The ventricles are normal in size and configuration. There is  no intracranial hemorrhage or mass effect. There are no acute signs of  ischemia based on CT imaging. No extra-axial hematoma or subarachnoid  hemorrhage.     No air-fluid levels within the visible paranasal sinuses. Mastoid air  cells well-aerated. The bony calvarium appears intact.       Impression:      1. No acute intracranial abnormality identified. No intracranial  hemorrhage. No acute signs of ischemia based on CT exam.     Comments: Findings called to Magda in the ER at 5:26 PM on 10/14/2022  This  report was finalized on 10/14/2022 17:26 by Dr. Letitia Cornejo MD.          LAB RESULTS:      Lab 10/14/22  1719   WBC 7.74   HEMOGLOBIN 16.9   HEMATOCRIT 47.5   PLATELETS 182   NEUTROS ABS 4.13   IMMATURE GRANS (ABS) 0.02   LYMPHS ABS 2.39   MONOS ABS 0.78   EOS ABS 0.39   MCV 86.4   PROTIME 12.4   APTT 31.0         Lab 10/15/22  0413 10/14/22  1719   SODIUM  --  136   POTASSIUM  --  4.6   CHLORIDE  --  100   CO2  --  22.0   ANION GAP  --  14.0   BUN  --  18   CREATININE  --  1.19   EGFR  --  69.9   GLUCOSE  --  206*   CALCIUM  --  9.6   HEMOGLOBIN A1C 8.70*  --          Lab 10/14/22  1719   TOTAL PROTEIN 7.6   ALBUMIN 4.50   GLOBULIN 3.1   ALT (SGPT) 22   AST (SGOT) 26   BILIRUBIN 0.3   ALK PHOS 109         Lab 10/14/22  1719   TROPONIN T <0.010   PROTIME 12.4   INR 0.96         Lab 10/15/22  0413   CHOLESTEROL 126   LDL CHOL 76   HDL CHOL 33*   TRIGLYCERIDES 85         Lab 10/14/22  1719   ABO TYPING A   RH TYPING Positive   ANTIBODY SCREEN Negative         Brief Urine Lab Results     None        Microbiology Results (last 10 days)     ** No results found for the last 240 hours. **          Chief Complaint on Day of Discharge: Denies any acute complaints.  He feels ready for discharge.    Hospital Course:  The patient is a 62 y.o. male who presented to [facility] with left-sided numbness and weakness.  He has a past medical history significant for insulin-dependent diabetes, hypertension, and gastroesophageal reflux disease.  He follows with Dr. Gandhi for his primary care.  Patient has not been to his PCP in over a year due to COVID pandemic.  On 10/14 he was with his family at a pumpkin patch when he noticed he had difficulty with sensation of the left arm and left leg with some weakness of the left leg.  He does have known diabetic peripheral neuropathy that affects both lower extremities.  In the ED, stat CT of the head negative for acute intracranial process. CT of the head and neck was negative  "for large vessel occlusion, but did demonstrate mild, diffuse atheromatous disease and a very diminutive right vertebral artery and a left dominant vertebral system.  The patient was not felt to be a candidate for tPA administration as his symptoms were predominantly sensory and NIHSS 2.  Patient elected not to proceed.  He was admitted to the hospitalist service for further evaluation and management.    He was seen in consultation by Dr. Chisholm.  MRI brain showed no evidence of acute infarct.  LDL 76.  Continue aspirin 81 mg daily.  He is okay for discharge from neurology standpoint.    PT/OT/SLP have evaluated.  He has no needs.    A1c 8.70.  A1c in September 2021 was 10.4.  Needs improved glycemic control with target hemoglobin A1c less than 7.  He does have an insulin meter at home and is compliant with taking his medications as prescribed.  Patient states that he seldomly checks his blood glucose at home.  Registered dietitian has met with patient today.    Transthoracic echocardiogram showed mildly decreased ejection fraction 46-50%.  He does take lisinopril at home.  He has had a labile blood pressure.  Recommend to check blood pressure at home and keep a log of readings to bring to follow-up appointment with primary care provider.  Could consider low-dose beta-blocker as outpatient if blood pressure will allow it.    All labs reviewed.  Home medication reviewed and resumed as appropriate.  He is medically stable for discharge home.    Condition on Discharge:  Medically stable.    Physical Exam on Discharge:  /71 (BP Location: Right arm, Patient Position: Sitting)   Pulse 90   Temp 97.4 °F (36.3 °C) (Oral)   Resp 18   Ht 180.3 cm (71\")   Wt 98 kg (216 lb)   SpO2 97%   BMI 30.13 kg/m²   Physical Exam  Vitals reviewed.   Constitutional:       General: He is not in acute distress.     Appearance: He is obese. He is not toxic-appearing.      Comments: Up in chair.  Multiple family members at " bedside.  No acute distress.  On room air.   HENT:      Head: Normocephalic and atraumatic.      Mouth/Throat:      Mouth: Mucous membranes are moist.      Pharynx: Oropharynx is clear.   Eyes:      Extraocular Movements: Extraocular movements intact.      Conjunctiva/sclera: Conjunctivae normal.      Pupils: Pupils are equal, round, and reactive to light.   Cardiovascular:      Rate and Rhythm: Normal rate and regular rhythm.      Pulses: Normal pulses.      Comments: Normal sinus rhythm 75-81 bpm  Pulmonary:      Effort: Pulmonary effort is normal. No respiratory distress.      Breath sounds: Normal breath sounds. No wheezing.   Abdominal:      General: Bowel sounds are normal. There is no distension.      Palpations: Abdomen is soft.      Tenderness: There is no abdominal tenderness.   Musculoskeletal:         General: No swelling or tenderness. Normal range of motion.      Cervical back: Normal range of motion and neck supple. No muscular tenderness.   Skin:     General: Skin is warm and dry.      Findings: No erythema or rash.   Neurological:      General: No focal deficit present.      Mental Status: He is alert and oriented to person, place, and time.      Cranial Nerves: No cranial nerve deficit.      Motor: No weakness.   Psychiatric:         Mood and Affect: Mood normal.         Behavior: Behavior normal.       Discharge Disposition:  Home or Self Care    Discharge Medications:     Discharge Medications      Continue These Medications      Instructions Start Date   aspirin 81 MG EC tablet  Notes to patient: Next dose due in am 10-16   81 mg, Oral, Daily      B-D UF III MINI PEN NEEDLES 31G X 5 MM misc  Generic drug: Insulin Pen Needle  Notes to patient: Next dose due tonight 10-15   1 Device, Does not apply, 4 Times Daily      BASAGLAR KWIKPEN 100 UNIT/ML injection pen  Notes to patient: Next dose due in am 10-16   50 Units, Subcutaneous, Daily      colesevelam 625 MG tablet  Commonly known as:  WELCHOL  Notes to patient: Next dose due tonight 10-15   TAKE 3 TABLETS BY MOUTH TWICE DAILY WITH MEALS      Dulaglutide 3 MG/0.5ML solution pen-injector  Notes to patient: Next dose due in am 10-16   3 mg, Subcutaneous, Weekly      Jardiance 25 MG tablet tablet  Generic drug: empagliflozin  Notes to patient: Next dose due in am 10-16   TAKE 1 TABLET DAILY      lisinopril 10 MG tablet  Commonly known as: PRINIVILZESTRIL  Notes to patient: Next dose due in am 10-16   10 mg, Oral, Daily      metFORMIN 500 MG tablet  Commonly known as: GLUCOPHAGE  Notes to patient: Next dose due tonight 10-15   1,000 mg, Oral, 2 Times Daily With Meals      NovoLOG FlexPen 100 UNIT/ML solution pen-injector sc pen  Generic drug: insulin aspart  Notes to patient: Next dose due tonight 10-15   7 Units, Subcutaneous, 3 Times Daily With Meals      pantoprazole 40 MG EC tablet  Commonly known as: PROTONIX  Notes to patient: Next dose due in am 10-16   40 mg, Oral, Daily      tadalafil 20 MG tablet  Commonly known as: CIALIS   20 mg, Oral, Daily PRN             Discharge Diet:   Diet Instructions     Diet: Soft Texture, Consistent Carbohydrate; Thin Liquids, No Restrictions; Whole      Discharge Diet:  Soft Texture  Consistent Carbohydrate       Fluid Consistency: Thin Liquids, No Restrictions    Soft Options: Whole          Activity at Discharge:   Activity Instructions     Activity as Tolerated            Discharge Care Plan/Instructions:   1.  Seek evaluation for worsening symptoms    Follow-up Appointments:   1.  Follow-up with Dr. Gandhi in 1 week.    Test Results Pending at Discharge: none.    Electronically signed by NICHOLAS Bob, 10/15/22, 14:02 CDT.    Time: 35 minutes.          Electronically signed by Shaun Cortez MD at 10/16/22 0483

## 2022-11-14 ENCOUNTER — TELEPHONE (OUTPATIENT)
Dept: FAMILY MEDICINE CLINIC | Facility: CLINIC | Age: 62
End: 2022-11-14

## 2022-11-14 ENCOUNTER — TELEPHONE (OUTPATIENT)
Dept: GASTROENTEROLOGY | Facility: CLINIC | Age: 62
End: 2022-11-14

## 2022-11-14 NOTE — TELEPHONE ENCOUNTER
Pt states he had what may have been a light stroke and came to this hospital on 10/14 and saw Dr Gandhi on 10/17. He would like to know if this will effect him having his upcoming endoscopy.

## 2022-11-14 NOTE — TELEPHONE ENCOUNTER
He will need medical clearance from Dr. Gandhi.  Please contact Dr. Gandhi's office and see if he is medically cleared to proceed with endoscopy given his recent concern for possible stroke.  Also, has he been placed on a blood thinner which we will need to know and if he can come off for the endoscopy.      How important is the EGD?  ie could it wait 3-6m?

## 2022-11-15 ENCOUNTER — TELEPHONE (OUTPATIENT)
Dept: GASTROENTEROLOGY | Facility: CLINIC | Age: 62
End: 2022-11-15

## 2022-11-15 NOTE — TELEPHONE ENCOUNTER
Please contact the patient and let him know that we have been in touch with Dr. Gandhi.  Since he is scheduled for an non-emergent procedure and he recently had strokelike symptoms, Dr. Gandhi suggested holding on pursuing the endoscopy which I thought was reasonable as well.  I suggest we wait until March to pursue endoscopy.      In the meantime he should practice strict reflux precautions and make sure he takes his time to cut and chew his food well.  If he starts having increase significant symptoms then he should come see us sooner in the office and we can reevaluate.    See me a copy of this note at the time of the endoscopy.

## 2022-11-15 NOTE — TELEPHONE ENCOUNTER
Please contact the patient and let him know that we have been in touch with Dr. Gandhi.  Since he is scheduled for an non-emergent procedure for follow-up of an endoscopy showing Damon's and esophagitis in September, and he recently had strokelike symptoms, Dr. Gandhi suggested holding on pursuing the endoscopy which I thought was reasonable as well.  I suggest we wait until March to pursue endoscopy.       In the meantime he should practice strict reflux precautions and make sure he takes his time to cut and chew his food well.  Continue current medications. If he starts having increase significant symptoms then he should come see us sooner in the office and we can reevaluate.     See me a copy of this note at the time of the endoscopy.

## 2022-12-19 ENCOUNTER — OFFICE VISIT (OUTPATIENT)
Dept: FAMILY MEDICINE CLINIC | Facility: CLINIC | Age: 62
End: 2022-12-19

## 2022-12-19 VITALS
DIASTOLIC BLOOD PRESSURE: 76 MMHG | WEIGHT: 212.6 LBS | HEIGHT: 71 IN | RESPIRATION RATE: 18 BRPM | HEART RATE: 83 BPM | BODY MASS INDEX: 29.76 KG/M2 | SYSTOLIC BLOOD PRESSURE: 122 MMHG | TEMPERATURE: 97.1 F | OXYGEN SATURATION: 98 %

## 2022-12-19 DIAGNOSIS — E11.9 CONTROLLED TYPE 2 DIABETES MELLITUS WITHOUT COMPLICATION, WITHOUT LONG-TERM CURRENT USE OF INSULIN: Chronic | ICD-10-CM

## 2022-12-19 DIAGNOSIS — R68.89 FLU-LIKE SYMPTOMS: Primary | ICD-10-CM

## 2022-12-19 DIAGNOSIS — J40 BRONCHITIS: ICD-10-CM

## 2022-12-19 LAB
EXPIRATION DATE: NORMAL
EXPIRATION DATE: NORMAL
FLUAV AG NPH QL: NEGATIVE
FLUBV AG NPH QL: NEGATIVE
INTERNAL CONTROL: NORMAL
INTERNAL CONTROL: NORMAL
Lab: NORMAL
Lab: NORMAL
SARS-COV-2 AG UPPER RESP QL IA.RAPID: NOT DETECTED

## 2022-12-19 PROCEDURE — 87426 SARSCOV CORONAVIRUS AG IA: CPT | Performed by: NURSE PRACTITIONER

## 2022-12-19 PROCEDURE — 99213 OFFICE O/P EST LOW 20 MIN: CPT | Performed by: NURSE PRACTITIONER

## 2022-12-19 PROCEDURE — 87804 INFLUENZA ASSAY W/OPTIC: CPT | Performed by: NURSE PRACTITIONER

## 2022-12-19 RX ORDER — AMOXICILLIN AND CLAVULANATE POTASSIUM 875; 125 MG/1; MG/1
1 TABLET, FILM COATED ORAL 2 TIMES DAILY
Qty: 20 TABLET | Refills: 0 | Status: SHIPPED | OUTPATIENT
Start: 2022-12-19 | End: 2022-12-29

## 2022-12-19 RX ORDER — COLESEVELAM 180 1/1
1875 TABLET ORAL 2 TIMES DAILY WITH MEALS
Qty: 540 TABLET | Refills: 1 | Status: SHIPPED | OUTPATIENT
Start: 2022-12-19

## 2022-12-19 NOTE — PROGRESS NOTES
"Subjective   Chief Complaint:  Fever, chills, cough, and nausea.    History of Present Illness:  This 62 y.o. male was seen in the office today. The patient presents today with complaints of pyrexia, chills, cough, and nausea. He reports his symptoms began with a cough on Friday, 12/16/2022. He states he was feeling improved on Saturday, 12/17/2022; however, on Saturday evening, he experienced severe diarrhea. Subsequently, the patient notes he laid down, but woke up approximately 1 to 2 hours later with chills, and despite being under a thick blanket, he was shaking so much his teeth were chattering. The patient reports he was febrile with a temperature of \"100 something.\" He reports Tylenol did alleviate his symptoms, although he continued to feel cold. He states he took Mucinex as he has been experiencing rhinorrhea and his cough has been productive with sputum that is sticky and brown in color.     Currently, the patient states he did not take any Tylenol this morning as he was afebrile. However, he continues to have a cough that is painful as well as pharyngitis. He reportedly vomited once this morning which he believes was due to postnasal drainage as he felt improved subsequently. The patient reports intermittent bilateral otalgia. He denies any allergies to medications.     The patient reports he needs a new prescription for Welchol, previously prescribed by Dr. Gandhi, and he takes 3 tablets twice daily.    No Known Allergies   Current Outpatient Medications on File Prior to Visit   Medication Sig   • aspirin 81 MG EC tablet Take 81 mg by mouth Daily.   • atorvastatin (Lipitor) 20 MG tablet Take 1 tablet by mouth Daily.   • Dulaglutide (Trulicity) 4.5 MG/0.5ML solution pen-injector Inject 0.5 mL under the skin into the appropriate area as directed 1 (One) Time Per Week.   • insulin aspart (NovoLOG FlexPen) 100 UNIT/ML solution pen-injector sc pen Inject 7 Units under the skin into the appropriate area as " directed 3 (Three) Times a Day With Meals.   • Insulin Glargine (BASAGLAR KWIKPEN) 100 UNIT/ML injection pen Inject 50 Units under the skin into the appropriate area as directed Daily for 90 days.   • Insulin Pen Needle (B-D UF III MINI PEN NEEDLES) 31G X 5 MM misc 1 Device 4 (Four) Times a Day.   • Jardiance 25 MG tablet tablet TAKE 1 TABLET DAILY   • lisinopril (PRINIVIL,ZESTRIL) 10 MG tablet TAKE 1 TABLET BY MOUTH DAILY   • pantoprazole (PROTONIX) 40 MG EC tablet Take 1 tablet by mouth Daily.   • tadalafil (CIALIS) 20 MG tablet Take 20 mg by mouth Daily As Needed for erectile dysfunction.     No current facility-administered medications on file prior to visit.      Past Medical, Surgical, Social, and Family History:  Past Medical History:   Diagnosis Date   • Cerebrovascular accident (CVA), unspecified mechanism (HCC) 10/14/2022   • Diabetes mellitus (HCC)    • Hypertension    • Premature ventricular beats      Past Surgical History:   Procedure Laterality Date   • APPENDECTOMY     • COLONOSCOPY  10/22/2013   • COLONOSCOPY N/A 9/19/2022    Procedure: COLONOSCOPY WITH ANESTHESIA;  Surgeon: Rian Nobles MD;  Location: [facility] ENDOSCOPY;  Service: Gastroenterology;  Laterality: N/A;  preop; hx of polyps  postop; hemmhroids   PCP Davi Gandhi    • ENDOSCOPY N/A 9/19/2022    Procedure: ESOPHAGOGASTRODUODENOSCOPY WITH ANESTHESIA;  Surgeon: Rian Nobles MD;  Location: [facility] ENDOSCOPY;  Service: Gastroenterology;  Laterality: N/A;  preop; dysphagia  postop; r/o barretts; esophagitis   PCP Davi Gandhi      Social History     Socioeconomic History   • Marital status:      Spouse name: Ada   • Number of children: 3   • Years of education: 14   Tobacco Use   • Smoking status: Never   • Smokeless tobacco: Never   Vaping Use   • Vaping Use: Never used   Substance and Sexual Activity   • Alcohol use: No   • Drug use: No   • Sexual activity: Defer     Family History   Problem Relation Age of Onset   •  Diabetes Mother    • Stroke Mother    • Hyperlipidemia Mother    • Heart disease Father    • Colon cancer Brother    • Cancer Brother        Prior Visit Notes/Records, Lab, Imaging, and Diagnostic Results Reviewed:  A1C:  Lab Results - Last 18 Months   Lab Units 10/15/22  0413 09/16/21  0742 06/21/21  0710   HEMOGLOBIN A1C % 8.70* 10.40* 10.10*     GLUCOSE:  Lab Results - Last 18 Months   Lab Units 10/14/22  1719 09/16/21  0742 06/21/21  0710   GLUCOSE mg/dL 206* 213* 118*     LIPID:  Lab Results - Last 18 Months   Lab Units 10/15/22  0413   LDL CHOL mg/dL 76   HDL CHOL mg/dL 33*   TRIGLYCERIDES mg/dL 85     PSA:No results for input(s): PSA in the last 33214 hours.  CBC:  Lab Results - Last 18 Months   Lab Units 10/14/22  1719   WBC 10*3/mm3 7.74   HEMOGLOBIN g/dL 16.9   HEMATOCRIT % 47.5   PLATELETS 10*3/mm3 182      BMP/CMP:  Lab Results - Last 18 Months   Lab Units 10/14/22  1719 09/16/21  0742 06/21/21  0710   SODIUM mmol/L 136 135* 140   POTASSIUM mmol/L 4.6 4.6 4.6   CHLORIDE mmol/L 100 100 103   TOTAL CO2 mmol/L  --  25.8 25.5   CO2 mmol/L 22.0  --   --    GLUCOSE mg/dL 206* 213* 118*   BUN mg/dL 18 19 22*   CREATININE mg/dL 1.19 1.33* 1.36*   EGFR IF NONAFRICN AM mL/min/1.73  --  55* 54*   EGFR IF AFRICN AM mL/min/1.73  --  67 65   CALCIUM mg/dL 9.6 10.0 9.5     HEPATIC:  Lab Results - Last 18 Months   Lab Units 10/14/22  1719 09/16/21  0742 06/21/21  0710   ALT (SGPT) U/L 22 14 19   AST (SGOT) U/L 26 17 17   ALK PHOS U/L 109 114 107     Vit D:No results for input(s): DCSS04TV in the last 09393 hours.  THYROID:No results for input(s): TSH, FREET4, FTI in the last 35998 hours.    Invalid input(s): FREET3, T3, T4, TEUP,  TOTALT4    Objective   Physical Exam  Constitutional:       General: He is not in acute distress.  HENT:      Right Ear: Tympanic membrane, ear canal and external ear normal.      Left Ear: Tympanic membrane, ear canal and external ear normal.   Cardiovascular:      Rate and Rhythm: Normal  "rate and regular rhythm.      Pulses: Normal pulses.      Heart sounds: No murmur heard.    No friction rub. No gallop.   Pulmonary:      Effort: Pulmonary effort is normal. No respiratory distress.      Breath sounds: Normal breath sounds. No wheezing or rhonchi.      Comments: Brown sputum.  Neurological:      Mental Status: He is alert.     /76 (BP Location: Left arm, Patient Position: Sitting, Cuff Size: Adult)   Pulse 83   Temp 97.1 °F (36.2 °C) (Infrared)   Resp 18   Ht 180.3 cm (71\")   Wt 96.4 kg (212 lb 9.6 oz)   SpO2 98%   BMI 29.65 kg/m²     Assessment & Plan   Diagnoses and all orders for this visit:    1. Flu-like symptoms (Primary)  -     POCT VERITOR SARS-CoV-2 Antigen  -     POCT Influenza A/B    2. Bronchitis    Other orders  -     amoxicillin-clavulanate (Augmentin) 875-125 MG per tablet; Take 1 tablet by mouth 2 (Two) Times a Day for 10 days.  Dispense: 20 tablet; Refill: 0  -     colesevelam (WELCHOL) 625 MG tablet; Take 3 tablets by mouth 2 (Two) Times a Day With Meals.  Dispense: 540 tablet; Refill: 1    Discussion:  Advised and educated plan of care. Advised the patient his influenza and COVID-19 testing was negative. Advised Augmentin twice daily for 10 days.  Side note-request refill-his Welchol was refilled today for 90 days.     Follow-up:  Return if symptoms worsen or fail to improve.    Electronically signed by Zack Copeland, 12/19/22, 4:36 PM CST.    "

## 2022-12-20 ENCOUNTER — TELEPHONE (OUTPATIENT)
Dept: FAMILY MEDICINE CLINIC | Facility: CLINIC | Age: 62
End: 2022-12-20

## 2022-12-20 NOTE — TELEPHONE ENCOUNTER
Caller: Abdullahi MACE    Relationship: Self    Best call back number: [phone]    What is the best time to reach you:   ANYTIME     Who are you requesting to speak with (clinical staff, provider,  specific staff member):   KATHE    Do you know the name of the person who called:   ABDULLAHI MACE    What was the call regarding:   PATIENT ADVISED THAT TRULICITY INSULIN INJECTOR WAS SENT TO MAIL IN PHARMACY BUT PATIENT WAS ADVISED THAT ADDITIONAL INFORMATION IS NEEDED.     PATIENT REQUESTED TO HAVE PHARMACY CONTACTED SO THIS CAN BE FILLED.     Do you require a callback: YES

## 2023-01-17 ENCOUNTER — OFFICE VISIT (OUTPATIENT)
Dept: FAMILY MEDICINE CLINIC | Facility: CLINIC | Age: 63
End: 2023-01-17
Payer: COMMERCIAL

## 2023-01-17 VITALS
HEART RATE: 94 BPM | OXYGEN SATURATION: 97 % | DIASTOLIC BLOOD PRESSURE: 62 MMHG | BODY MASS INDEX: 29.85 KG/M2 | HEIGHT: 71 IN | TEMPERATURE: 97.5 F | WEIGHT: 213.2 LBS | RESPIRATION RATE: 16 BRPM | SYSTOLIC BLOOD PRESSURE: 104 MMHG

## 2023-01-17 DIAGNOSIS — Z79.01 ANTICOAGULATED: ICD-10-CM

## 2023-01-17 DIAGNOSIS — Z79.4 TYPE 2 DIABETES MELLITUS WITH HYPERGLYCEMIA, WITH LONG-TERM CURRENT USE OF INSULIN: ICD-10-CM

## 2023-01-17 DIAGNOSIS — Z12.5 ENCOUNTER FOR PROSTATE CANCER SCREENING: ICD-10-CM

## 2023-01-17 DIAGNOSIS — E78.2 MIXED HYPERLIPIDEMIA: Chronic | ICD-10-CM

## 2023-01-17 DIAGNOSIS — E55.9 VITAMIN D DEFICIENCY: ICD-10-CM

## 2023-01-17 DIAGNOSIS — E11.65 TYPE 2 DIABETES MELLITUS WITH HYPERGLYCEMIA, WITH LONG-TERM CURRENT USE OF INSULIN: ICD-10-CM

## 2023-01-17 DIAGNOSIS — I10 PRIMARY HYPERTENSION: Chronic | ICD-10-CM

## 2023-01-17 PROCEDURE — 99214 OFFICE O/P EST MOD 30 MIN: CPT | Performed by: FAMILY MEDICINE

## 2023-01-17 NOTE — PROGRESS NOTES
Subjective   Abdullahi MACE is a 62 y.o. male presenting with chief complaint of:   Chief Complaint   Patient presents with   • Hypertension   • Diabetes     AWV NA    History of Present Illness :  Alone.    Here for review of chronic problems that includes DM2 and others.      Has multiple chronic problems to consider that might have a bearing on today's issues;  an interval appointment.       Chronic/acute problems reviewed today:   1. Anticoagulated: CVA/ASA 81 Chronic/stable reason for stopping or use of.  Denies bleeding issues; especially epistaxis, melena, hematochezia.  Upper arms/others do not significantly bruise easily.  No significant bleeding or falls.      2. Mixed hyperlipidemia Chronic/stable.  Tolerated use of Rx with labs showing improved lipid values and tolerant liver labs. No muscle aches unexpected.      3. Primary hypertension Chronic/stable. Stable here past/no recent home blood pressures.  No significant chest pain, SOB, LE edema, orthopnea, near syncope, dizziness/light headness.   Recent Vitals       10/17/2022 12/19/2022 1/17/2023       BP: 124/68 122/76 104/62     Pulse: 90 83 94     Temp: 96.9 °F (36.1 °C) 97.1 °F (36.2 °C) 97.5 °F (36.4 °C)     Weight: 97.2 kg (214 lb 3.2 oz) 96.4 kg (212 lb 9.6 oz) 96.7 kg (213 lb 3.2 oz)     BMI (Calculated): 29.9 29.7 29.7            4. Vitamin D deficiency chronic/variable up/down with past labs and/or risk to run low especially in winter. Lab monitored.      5. Encounter for prostate cancer screening chronic ongoing need to review his risk for prostate cancer.  Denies change in voiding and no visible hematuria   6. Type 2 diabetes mellitus with hyperglycemia, with long-term current use of insulin (HCC) : Chronic/stable.  No problem/pattern hypoglycemia/hyperglycemia manifest by poly- dypsia, phagia, uria, or sweats, diaphoretic episodes, syncope/near.       Has an/another acute issue today: none.    The following portions of the patient's history  were reviewed and updated as appropriate: allergies, current medications, past family history, past medical history, past social history, past surgical history and problem list.      Current Outpatient Medications:   •  aspirin 81 MG EC tablet, Take 81 mg by mouth Daily., Disp: , Rfl:   •  atorvastatin (Lipitor) 20 MG tablet, Take 1 tablet by mouth Daily., Disp: 90 tablet, Rfl: 1  •  colesevelam (WELCHOL) 625 MG tablet, Take 3 tablets by mouth 2 (Two) Times a Day With Meals., Disp: 540 tablet, Rfl: 1  •  Dulaglutide (Trulicity) 4.5 MG/0.5ML solution pen-injector, Inject 0.5 mL under the skin into the appropriate area as directed 1 (One) Time Per Week., Disp: 12 mL, Rfl: 3  •  insulin aspart (NovoLOG FlexPen) 100 UNIT/ML solution pen-injector sc pen, Inject 7 Units under the skin into the appropriate area as directed 3 (Three) Times a Day With Meals., Disp: 20 mL, Rfl: 3  •  Insulin Glargine (BASAGLAR KWIKPEN) 100 UNIT/ML injection pen, Inject 50 Units under the skin into the appropriate area as directed Daily for 90 days., Disp: 45 mL, Rfl: 3  •  Insulin Pen Needle (B-D UF III MINI PEN NEEDLES) 31G X 5 MM misc, 1 Device 4 (Four) Times a Day., Disp: 400 each, Rfl: 3  •  Jardiance 25 MG tablet tablet, TAKE 1 TABLET DAILY, Disp: 90 tablet, Rfl: 1  •  lisinopril (PRINIVIL,ZESTRIL) 10 MG tablet, TAKE 1 TABLET BY MOUTH DAILY, Disp: 90 tablet, Rfl: 3  •  metFORMIN (GLUCOPHAGE) 500 MG tablet, TAKE 2 TABLETS BY MOUTH TWICE DAILY WITH MEALS, Disp: 360 tablet, Rfl: 3  •  pantoprazole (PROTONIX) 40 MG EC tablet, Take 1 tablet by mouth Daily., Disp: 30 tablet, Rfl: 11  •  tadalafil (CIALIS) 20 MG tablet, Take 20 mg by mouth Daily As Needed for erectile dysfunction., Disp: , Rfl:       Current Outpatient Medications for DM:   •  Dulaglutide (Trulicity) 4.5 MG/0.5ML solution pen-injector, Inject 0.5 mL under the skin into the appropriate area as directed 1 (One) Time Per Week., Disp: 12 mL, Rfl: 3  •  insulin aspart (NovoLOG  FlexPen) 100 UNIT/ML solution pen-injector sc pen, Inject 7 Units under the skin into the appropriate area as directed 3 (Three) Times a Day With Meals., Disp: 20 mL, Rfl: 3  7 meals (21)   •  Insulin Glargine (BASAGLAR KWIKPEN) 100 UNIT/ML injection pen, Inject 50 Units under the skin into the appropriate area as directed Daily for 90 days. (Patient taking differently: Inject 50 Units under the skin into the appropriate area as directed Daily. On 30 right now, maybe increasing), Disp: 45 mL, Rfl: 3  30 once a day  Total insulin 51 units  •  Jardiance 25 MG tablet tablet, TAKE 1 TABLET DAILY, Disp: 90 tablet, Rfl: 1  •  metFORMIN (GLUCOPHAGE) 500 MG tablet, TAKE 2 TABLETS BY MOUTH TWICE DAILY WITH MEALS, Disp: 360 tablet, Rfl: 3    No problems with medications.    No Known Allergies    Review of Systems  GENERAL:  Active/slower with limits, speed, stamina for age.  Sleep is ok. No fever now.  ENDO:  No syncope, near or diaphoretic sweaty spells.  BS variable;  no download noted.  HEENT: No head injury or headache.   No vision change.  No significant hearing loss.  Ears without pain/drainage.  No sore throat.  No usual significant nasal/sinus congestion/drainage; worse couple weeks.  No epistaxis.  CHEST: No chest wall tenderness or mass. No usual significant cough, without wheeze.  No SOB; no hemoptysis.  CV: No chest pain, palpitations, ankle edema.  GI: No heartburn, dysphagia.  No abdominal pain, diarrhea, constipation.  No rectal bleeding, or melena.    :  Voids without dysuria, or incontinence to completion.   ORTHO: No painful/swollen joints but various on /off sore.  No sore neck or back.  No acute neck or back pain without recent injury.   NEURO: No dizziness, weakness of extremities.    Above a new numbness/paresthesias.     PSYCH: No memory loss.  Mood good; not anxious, depressed   Screening:  Mammogram: NA  Bone density: NA  Low dose CT chest: Tobacco-smoker/never: NA  [facility]:    [facility]  EGD-?b-dil/[facility]//9.19.22/2m  Prostate: 1.17.23 ok-voiding ok  10.17.22 ro deferred  12.23.20  12.11.19 AUA 3/1  7.13.18  Usual lab order  6m CMP, A1c  12m CBC, CMP, A1c, LIPID, TSH, PSAs, B12, folate    Copy/paste function used for ROS/exam AND each area of these were reviewed, updated, confirmed and supplemented as needed.  Data reviewed:   Recent admit/ER/MD visits: 10.17.22    1. Primary hypertension    2. Mixed hyperlipidemia    3. Type 2 diabetes mellitus with hyperglycemia, with long-term current use of insulin (HCC)    4. Suspected cerebrovascular accident (CVA)    5. Left sided numbness    6. Anticoagulated: CVA/ASA 81       Rx: reviewed/changes:  No orders of the defined types were placed in this encounter.     LAB/Testing/Referrals: reviewed/orders:   Today: None  No orders of the defined types were placed in this encounter.     Usual:   Same     BS: 8.7 10.15.22; down 9.16.21 10.4  LIPID: 88 LDL 12.21.20  PSA: ok 12.21.20  CBC: ok 10.15.22  Renal: ok 10.15.22  Liver: ok 10.14.22  Vit D: NA  Thyroid: TSH ok 12.21.20     Discussions:   Needs to tighten attention to diet, exercise, weight  Options new Rx-increase truliicty 3 to 4.5  Usually hold non-emergent testing 6m after CVA; noting however only EGD    Last cardiac testing:   Echo:   Results for orders placed during the hospital encounter of 10/14/22    Adult Transthoracic Echo Complete W/ Cont if Necessary Per Protocol (With Agitated Saline)    Interpretation Summary  •  Left ventricular ejection fraction appears to be 46 - 50%. Left ventricular systolic function is mildly decreased.  •  Left ventricular diastolic function was normal.  •  Saline test results are negative.  •  Estimated right ventricular systolic pressure from tricuspid regurgitation is normal (<35 mmHg).  •  Mild dilation of the aortic root is present.    Radiology considered:   No radiology results for the last 90 days.    Lab Results:  Results  for orders placed or performed in visit on 12/19/22   POCT VERITOR SARS-CoV-2 Antigen    Specimen: Nasopharynx; Swab   Result Value Ref Range    SARS Antigen Not Detected Not Detected, Presumptive Negative    Internal Control Passed Passed    Lot Number [id number]     Expiration Date 06/04/2023    POCT Influenza A/B    Specimen: Swab   Result Value Ref Range    Rapid Influenza A Ag Negative Negative    Rapid Influenza B Ag Negative Negative    Internal Control Passed Passed    Lot Number [id number]     Expiration Date 10/26/2023        A1C:  Lab Results - Last 18 Months   Lab Units 10/15/22  0413 09/16/21  0742   HEMOGLOBIN A1C % 8.70* 10.40*     GLUCOSE:  Lab Results - Last 18 Months   Lab Units 10/14/22  1719 09/16/21  0742   GLUCOSE mg/dL 206* 213*     LIPID:  Lab Results - Last 18 Months   Lab Units 10/15/22  0413   LDL CHOL mg/dL 76   HDL CHOL mg/dL 33*   TRIGLYCERIDES mg/dL 85     PSA:No results for input(s): PSA in the last 85944 hours.    CBC:  Lab Results - Last 18 Months   Lab Units 10/14/22  1719   WBC 10*3/mm3 7.74   HEMOGLOBIN g/dL 16.9   HEMATOCRIT % 47.5   PLATELETS 10*3/mm3 182      BMP/CMP:  Lab Results - Last 18 Months   Lab Units 10/14/22  1719 09/16/21  0742   SODIUM mmol/L 136 135*   POTASSIUM mmol/L 4.6 4.6   CHLORIDE mmol/L 100 100   TOTAL CO2 mmol/L  --  25.8   CO2 mmol/L 22.0  --    GLUCOSE mg/dL 206* 213*   BUN mg/dL 18 19   CREATININE mg/dL 1.19 1.33*   EGFR IF NONAFRICN AM mL/min/1.73  --  55*   EGFR IF AFRICN AM mL/min/1.73  --  67   CALCIUM mg/dL 9.6 10.0     HEPATIC:  Lab Results - Last 18 Months   Lab Units 10/14/22  1719 09/16/21  0742   ALT (SGPT) U/L 22 14   AST (SGOT) U/L 26 17   ALK PHOS U/L 109 114     Vit D:No results for input(s): [id number] in the last 90594 hours.  THYROID:No results for input(s): TSH, FREET4, FTI in the last 46495 hours.    Invalid input(s): FREET3, T3, T4, TEUP,  TOTALT4      Objective   /62 (BP Location: Right arm, Patient Position: Sitting, Cuff Size:  "Large Adult)   Pulse 94   Temp 97.5 °F (36.4 °C) (Infrared)   Resp 16   Ht 180.3 cm (71\")   Wt 96.7 kg (213 lb 3.2 oz)   SpO2 97%   BMI 29.74 kg/m²   Body mass index is 29.74 kg/m².    Recent Vitals       10/15/2022 10/17/2022 12/19/2022       BP: 117/71 124/68 122/76     Pulse: 90 90 83     Temp: 97.4 °F (36.3 °C) 96.9 °F (36.1 °C) 97.1 °F (36.2 °C)     Weight: -- 97.2 kg (214 lb 3.2 oz) 96.4 kg (212 lb 9.6 oz)     BMI (Calculated): -- 29.9 29.7         Wt Readings from Last 15 Encounters:   01/17/23 1136 96.7 kg (213 lb 3.2 oz)   12/19/22 1504 96.4 kg (212 lb 9.6 oz)   10/17/22 1504 97.2 kg (214 lb 3.2 oz)   10/15/22 1055 98 kg (216 lb)   10/14/22 2034 95.5 kg (210 lb 8 oz)   10/14/22 1709 98 kg (216 lb)   10/14/22 1828 97.5 kg (215 lb)   09/19/22 1117 97.5 kg (215 lb)   09/08/22 0833 96.6 kg (213 lb)   09/20/21 1038 91.6 kg (202 lb)   06/23/21 0930 90.7 kg (200 lb)   12/23/20 0902 91.8 kg (202 lb 6.4 oz)   12/11/19 0905 88 kg (194 lb)   11/12/19 1051 88.5 kg (195 lb)   11/04/19 0933 88.5 kg (195 lb)   10/21/19 0932 88.9 kg (196 lb)   10/07/19 0921 88.9 kg (196 lb)       Physical Exam  GENERAL:  Well nourished/developed in no acute distress. Obese   SKIN: Turgor excellent, without wound, rash, lesion    HEENT: Normal cephalic without trauma.  Pupils equal round reactive to light. Extraocular motions full without nystagmus.   External canals nonobstructive nontender without reddness. Tymphatic membranes vale with ottoniel structures intact.   Oral cavity without growths, exudates, and moist.  Posterior pharynx without mass, obstruction, redness.  No thyromegaly, mass, tenderness, lymphadenopathy and supple.  CV: Regular rhythm.  No murmur, gallop,  edema. Posterior pulses intact/weakly.  No carotid bruits.  CHEST: No chest wall tenderness or mass.   LUNGS: Symmetric motion with clear to auscultation.   ABD: Soft, nontender without mass.    PROSTATE: No visible/palpable lesion/tenderness and anal tone " intact/full.  Prostate 20 ml/smooth and nontender.  No rectal mass within reach. Stool on glove brown.   ORTHO: Symmetric extremities without swelling/point tenderness.  Full gross range of motion.  NEURO: CN 2-12 grossly intact.  Symmetric facies. 1/4 x bicep knee equal reflexes.  UE/LE   3-4/5 strength throughout with ? 2/5  LUE.  Interossous muscle wasting L hand.   Speech clear.  Reduced light touch with monofilament, vibratory sensation with tuning fork; equal toes/distal feet.    PSYCH: Oriented x 3.  Pleasant calm, well kept.  Purposeful/directed conservation with intact short/long gross memory    Assessment & Plan     1. Anticoagulated: CVA/ASA 81    2. Mixed hyperlipidemia    3. Primary hypertension    4. Vitamin D deficiency    5. Encounter for prostate cancer screening    6. Type 2 diabetes mellitus with hyperglycemia, with long-term current use of insulin (HCC)      Data review above:   Discussions/medical decisions/reviews:  BP ok  Other vitals ok  DM/BS 8.7 10.15.22 (improved 10.4)  Lipid LDL 76 10.15.22-lipitor  PSA due  CBC ok 10.14.22  Renal improved 10.14.22  Liver ok 10.14.22  Vit D sometime  Thyroid sometime    Fasting lab when can  Will report if LUE gets weaker; would suggest EMG/NCV at that point    Data review above:   Rx: reviewed and decisions:   Rx new/changes: none  No orders of the defined types were placed in this encounter.    Orders placed:   LAB/Testing/Referrals: reviewed/orders:   Today: future  Orders Placed This Encounter   Procedures   • PSA Screen   • Comprehensive Metabolic Panel   • TSH   • T4, Free   • Vitamin D,25-Hydroxy   • CBC & Differential     Chronic/recurrent labs above or change to:   Same   Screening reviewed/updated     Immunization History   Administered Date(s) Administered   • COVID-19 (MODERNA) 1st, 2nd, 3rd Dose Only 08/02/2021, 08/30/2021   • Flu Vaccine Intradermal Quad 18-64YR 12/11/2019   • Flucelvax Quad Vial =>4yrs 12/11/2019     Vaccine reviewed:  today none; later we advised/reaffirmed our support/suggestion for staying complete with covid- covid boosters, seasonal flu/yearly and any missing vaccine from list we supplied; we suggest contact with local health department office to review missing/needed vaccines and then bring nursing documentation for these vaccines to this office.     Health maintenance:   Body mass index is 29.74 kg/m².  BMI is >= 25 and <30. (Overweight) The following options were offered after discussion;: exercise counseling/recommendations and nutrition counseling/recommendations      Tobacco use reviewed:   Abdullahi MACE  reports that he has never smoked. He has never used smokeless tobacco..     There are no Patient Instructions on file for this visit.    Visit today involved chronic significant medical problems or differentials and/or intensive drug monitoring: ie potential to cause serious morbidity or death:     Follow up: Return for fasting lab when can; then lab/Dr Gandhi 6m.  Future Appointments   Date Time Provider Department Center   1/19/2023  8:15 AM [facility] [place] [facility]   7/20/2023  8:15 AM [facility]   7/25/2023  9:15 AM Davi Gandhi MD [facility]

## 2023-01-19 ENCOUNTER — LAB (OUTPATIENT)
Dept: FAMILY MEDICINE CLINIC | Facility: CLINIC | Age: 63
End: 2023-01-19
Payer: COMMERCIAL

## 2023-01-23 DIAGNOSIS — E11.65 TYPE 2 DIABETES MELLITUS WITH HYPERGLYCEMIA, WITH LONG-TERM CURRENT USE OF INSULIN: Primary | ICD-10-CM

## 2023-01-23 DIAGNOSIS — Z79.4 TYPE 2 DIABETES MELLITUS WITH HYPERGLYCEMIA, WITH LONG-TERM CURRENT USE OF INSULIN: Primary | ICD-10-CM

## 2023-01-23 DIAGNOSIS — E55.9 VITAMIN D DEFICIENCY: ICD-10-CM

## 2023-01-23 RX ORDER — ERGOCALCIFEROL 1.25 MG/1
50000 CAPSULE ORAL WEEKLY
Qty: 5 CAPSULE | Refills: 0 | Status: SHIPPED | OUTPATIENT
Start: 2023-01-23 | End: 2023-02-21

## 2023-02-03 ENCOUNTER — PREP FOR SURGERY (OUTPATIENT)
Dept: OTHER | Facility: HOSPITAL | Age: 63
End: 2023-02-03
Payer: COMMERCIAL

## 2023-02-03 DIAGNOSIS — R13.10 DYSPHAGIA, UNSPECIFIED TYPE: ICD-10-CM

## 2023-02-03 DIAGNOSIS — Z87.19 H/O GASTROESOPHAGEAL REFLUX (GERD): Primary | ICD-10-CM

## 2023-02-21 DIAGNOSIS — E55.9 VITAMIN D DEFICIENCY: ICD-10-CM

## 2023-02-21 RX ORDER — ERGOCALCIFEROL 1.25 MG/1
CAPSULE ORAL
Qty: 5 CAPSULE | Refills: 0 | Status: SHIPPED | OUTPATIENT
Start: 2023-02-21

## 2023-02-21 NOTE — TELEPHONE ENCOUNTER
Rx Refill Note  Requested Prescriptions     Pending Prescriptions Disp Refills   • vitamin D (ERGOCALCIFEROL) 1.25 MG (13894 UT) capsule capsule [Pharmacy Med Name: VITAMIN D2 50,000IU (ERGO) CAP RX] 5 capsule 0     Sig: TAKE 1 CAPSULE BY MOUTH 1 TIME EVERY WEEK      Last office visit with prescribing clinician: 1/17/2023      Next office visit with prescribing clinician: 7/25/2023            Pradip Ames MA  02/21/23, 16:01 CST

## 2023-03-23 ENCOUNTER — LAB (OUTPATIENT)
Dept: FAMILY MEDICINE CLINIC | Facility: CLINIC | Age: 63
End: 2023-03-23
Payer: COMMERCIAL

## 2023-04-04 DIAGNOSIS — E11.65 UNCONTROLLED TYPE 2 DIABETES MELLITUS WITH HYPERGLYCEMIA: ICD-10-CM

## 2023-04-04 RX ORDER — EMPAGLIFLOZIN 25 MG/1
TABLET, FILM COATED ORAL
Qty: 90 TABLET | Refills: 1 | Status: SHIPPED | OUTPATIENT
Start: 2023-04-04

## 2023-04-06 ENCOUNTER — HOSPITAL ENCOUNTER (OUTPATIENT)
Facility: HOSPITAL | Age: 63
Setting detail: HOSPITAL OUTPATIENT SURGERY
Discharge: HOME | End: 2023-04-06
Attending: INTERNAL MEDICINE | Admitting: INTERNAL MEDICINE
Payer: COMMERCIAL

## 2023-04-06 ENCOUNTER — ANESTHESIA (OUTPATIENT)
Dept: GASTROENTEROLOGY | Facility: HOSPITAL | Age: 63
End: 2023-04-06
Payer: COMMERCIAL

## 2023-04-06 ENCOUNTER — ANESTHESIA EVENT (OUTPATIENT)
Dept: GASTROENTEROLOGY | Facility: HOSPITAL | Age: 63
End: 2023-04-06
Payer: COMMERCIAL

## 2023-04-06 VITALS
BODY MASS INDEX: 29.68 KG/M2 | DIASTOLIC BLOOD PRESSURE: 74 MMHG | HEIGHT: 71 IN | OXYGEN SATURATION: 98 % | TEMPERATURE: 97 F | RESPIRATION RATE: 18 BRPM | HEART RATE: 71 BPM | WEIGHT: 212 LBS | SYSTOLIC BLOOD PRESSURE: 103 MMHG

## 2023-04-06 DIAGNOSIS — Z87.19 H/O GASTROESOPHAGEAL REFLUX (GERD): ICD-10-CM

## 2023-04-06 DIAGNOSIS — R13.10 DYSPHAGIA, UNSPECIFIED TYPE: ICD-10-CM

## 2023-04-06 LAB — GLUCOSE BLDC GLUCOMTR-MCNC: 167 MG/DL (ref 70–130)

## 2023-04-06 PROCEDURE — 88305 TISSUE EXAM BY PATHOLOGIST: CPT | Performed by: INTERNAL MEDICINE

## 2023-04-06 PROCEDURE — 43239 EGD BIOPSY SINGLE/MULTIPLE: CPT | Performed by: INTERNAL MEDICINE

## 2023-04-06 PROCEDURE — 82962 GLUCOSE BLOOD TEST: CPT

## 2023-04-06 PROCEDURE — 25010000002 PROPOFOL 10 MG/ML EMULSION: Performed by: NURSE ANESTHETIST, CERTIFIED REGISTERED

## 2023-04-06 RX ORDER — ONDANSETRON 2 MG/ML
4 INJECTION INTRAMUSCULAR; INTRAVENOUS ONCE AS NEEDED
Status: DISCONTINUED | OUTPATIENT
Start: 2023-04-06 | End: 2023-04-06 | Stop reason: HOSPADM

## 2023-04-06 RX ORDER — SODIUM CHLORIDE 9 MG/ML
500 INJECTION, SOLUTION INTRAVENOUS CONTINUOUS PRN
Status: DISCONTINUED | OUTPATIENT
Start: 2023-04-06 | End: 2023-04-06 | Stop reason: HOSPADM

## 2023-04-06 RX ORDER — SODIUM CHLORIDE 9 MG/ML
1000 INJECTION, SOLUTION INTRAVENOUS CONTINUOUS
Status: CANCELLED | OUTPATIENT
Start: 2023-04-06

## 2023-04-06 RX ORDER — PROPOFOL 10 MG/ML
VIAL (ML) INTRAVENOUS AS NEEDED
Status: DISCONTINUED | OUTPATIENT
Start: 2023-04-06 | End: 2023-04-06 | Stop reason: SURG

## 2023-04-06 RX ORDER — LIDOCAINE HYDROCHLORIDE 20 MG/ML
INJECTION, SOLUTION EPIDURAL; INFILTRATION; INTRACAUDAL; PERINEURAL AS NEEDED
Status: DISCONTINUED | OUTPATIENT
Start: 2023-04-06 | End: 2023-04-06 | Stop reason: SURG

## 2023-04-06 RX ORDER — PANTOPRAZOLE SODIUM 40 MG/1
40 TABLET, DELAYED RELEASE ORAL DAILY
Qty: 90 TABLET | Refills: 3 | Status: SHIPPED | OUTPATIENT
Start: 2023-04-06 | End: 2024-04-05

## 2023-04-06 RX ORDER — SODIUM CHLORIDE 0.9 % (FLUSH) 0.9 %
10 SYRINGE (ML) INJECTION AS NEEDED
Status: DISCONTINUED | OUTPATIENT
Start: 2023-04-06 | End: 2023-04-06 | Stop reason: HOSPADM

## 2023-04-06 RX ORDER — LIDOCAINE HYDROCHLORIDE 10 MG/ML
0.5 INJECTION, SOLUTION EPIDURAL; INFILTRATION; INTRACAUDAL; PERINEURAL ONCE AS NEEDED
Status: CANCELLED | OUTPATIENT
Start: 2023-04-06

## 2023-04-06 RX ADMIN — PROPOFOL INJECTABLE EMULSION 50 MG: 10 INJECTION, EMULSION INTRAVENOUS at 09:23

## 2023-04-06 RX ADMIN — LIDOCAINE HYDROCHLORIDE 100 MG: 20 INJECTION, SOLUTION EPIDURAL; INFILTRATION; INTRACAUDAL; PERINEURAL at 09:21

## 2023-04-06 RX ADMIN — SODIUM CHLORIDE 500 ML: 9 INJECTION, SOLUTION INTRAVENOUS at 08:31

## 2023-04-06 RX ADMIN — PROPOFOL INJECTABLE EMULSION 100 MG: 10 INJECTION, EMULSION INTRAVENOUS at 09:21

## 2023-04-06 RX ADMIN — PROPOFOL INJECTABLE EMULSION 50 MG: 10 INJECTION, EMULSION INTRAVENOUS at 09:26

## 2023-04-06 NOTE — ANESTHESIA POSTPROCEDURE EVALUATION
"Patient: Abdullahi MACE    Procedure Summary     Date: 04/06/23 Room / Location: [facility] ENDOSCOPY 5 / [facility] ENDOSCOPY    Anesthesia Start: 0918 Anesthesia Stop: 0934    Procedure: ESOPHAGOGASTRODUODENOSCOPY Diagnosis:       H/O gastroesophageal reflux (GERD)      Dysphagia, unspecified type      (H/O gastroesophageal reflux (GERD) [Z87.19])      (Dysphagia, unspecified type [R13.10])    Surgeons: Rian Nobles MD Provider: Tristen Mae CRNA    Anesthesia Type: MAC ASA Status: 3          Anesthesia Type: MAC    Vitals  Vitals Value Taken Time   BP     Temp     Pulse 76 04/06/23 0934   Resp     SpO2 95 % 04/06/23 0934   Vitals shown include unvalidated device data.        Post Anesthesia Care and Evaluation    Patient location during evaluation: PHASE II  Patient participation: complete - patient participated  Level of consciousness: awake  Pain score: 0  Pain management: adequate    Airway patency: patent  Anesthetic complications: No anesthetic complications  PONV Status: none  Cardiovascular status: acceptable  Respiratory status: acceptable  Hydration status: acceptable    Comments: Blood pressure 97/68, pulse 76, temperature 97 °F (36.1 °C), temperature source Temporal, resp. rate 14, height 180.3 cm (71\"), weight 96.2 kg (212 lb), SpO2 95 %.        "

## 2023-04-06 NOTE — ANESTHESIA PREPROCEDURE EVALUATION
Anesthesia Evaluation     Patient summary reviewed and Nursing notes reviewed   no history of anesthetic complications:  NPO Solid Status: > 8 hours  NPO Liquid Status: > 8 hours           Airway   Mallampati: II  TM distance: >3 FB  Neck ROM: full  No difficulty expected  Dental - normal exam     Pulmonary - negative pulmonary ROS   Cardiovascular   Exercise tolerance: good (4-7 METS)    (+) hypertension, hyperlipidemia,       Neuro/Psych  (+) CVA,    GI/Hepatic/Renal/Endo    (+)  GERD,  diabetes mellitus using insulin,     Musculoskeletal     Abdominal    Substance History      OB/GYN          Other                          Anesthesia Plan    ASA 3     MAC     intravenous induction     Anesthetic plan, risks, benefits, and alternatives have been provided, discussed and informed consent has been obtained with: patient.        CODE STATUS:

## 2023-04-06 NOTE — H&P
[facility] Gastroenterology  Pre Procedure History & Physical    Chief Complaint:   Damon's    Subjective     HPI:   Damon's  Back in September he had an endoscopy showing reflux esophagitis and Damon's noted on pathology.  He was placed on pantoprazole.  He does have a follow-up endoscopy in a couple months but he had strokelike symptoms this was placed on hold.  He states that his MRI turned out okay and has had no residual effects.  He denies any active reflux.  Continues on pantoprazole.  He is cut out caffeine and chocolate and heterogenous from his diet he is doing quite well he states.  He presents today for follow-up endoscopy    Past Medical History:   Past Medical History:   Diagnosis Date   • Cerebrovascular accident (CVA), unspecified mechanism 10/14/2022   • Diabetes mellitus    • Hypertension    • Premature ventricular beats        Past Surgical History:  Past Surgical History:   Procedure Laterality Date   • APPENDECTOMY     • COLONOSCOPY  10/22/2013   • COLONOSCOPY N/A 9/19/2022    Procedure: COLONOSCOPY WITH ANESTHESIA;  Surgeon: Rian Nobles MD;  Location: [facility] ENDOSCOPY;  Service: Gastroenterology;  Laterality: N/A;  preop; hx of polyps  postop; hemmhroids   PCP Davi Gandhi    • ENDOSCOPY N/A 9/19/2022    Procedure: ESOPHAGOGASTRODUODENOSCOPY WITH ANESTHESIA;  Surgeon: Rian Nobles MD;  Location: [facility] ENDOSCOPY;  Service: Gastroenterology;  Laterality: N/A;  preop; dysphagia  postop; r/o barretts; esophagitis   PCP Davi Gandhi        Family History:  Family History   Problem Relation Age of Onset   • Diabetes Mother    • Stroke Mother    • Hyperlipidemia Mother    • Heart disease Father    • Colon cancer Brother    • Cancer Brother        Social History:   reports that he has never smoked. He has never used smokeless tobacco. He reports that he does not drink alcohol and does not use drugs.    Medications:   Prior to Admission medications    Medication Sig Start Date End  Date Taking? Authorizing Provider   aspirin 81 MG EC tablet Take 1 tablet by mouth Daily.   Yes Oleksandr Parekh MD   atorvastatin (Lipitor) 20 MG tablet Take 1 tablet by mouth Daily. 10/17/22  Yes Davi Gandhi MD   colesevelam (WELCHOL) 625 MG tablet Take 3 tablets by mouth 2 (Two) Times a Day With Meals. 12/19/22  Yes Zack Copeland APRN   insulin aspart (NovoLOG FlexPen) 100 UNIT/ML solution pen-injector sc pen Inject 7 Units under the skin into the appropriate area as directed 3 (Three) Times a Day With Meals. 9/21/21  Yes Davi Gandhi MD   Insulin Glargine (BASAGLAR TEMPO PEN SC) Inject 30 Units under the skin into the appropriate area as directed.   Yes ProviderOleksandr MD   Insulin Pen Needle (B-D UF III MINI PEN NEEDLES) 31G X 5 MM misc 1 Device 4 (Four) Times a Day. 9/21/21  Yes Davi Gandhi MD   Jardiance 25 MG tablet tablet TAKE 1 TABLET DAILY 4/4/23  Yes Davi Gandhi MD   lisinopril (PRINIVIL,ZESTRIL) 10 MG tablet TAKE 1 TABLET BY MOUTH DAILY 8/2/22  Yes Davi Gandhi MD   metFORMIN (GLUCOPHAGE) 500 MG tablet TAKE 2 TABLETS BY MOUTH TWICE DAILY WITH MEALS 12/19/22  Yes Davi Gandhi MD   pantoprazole (PROTONIX) 40 MG EC tablet Take 1 tablet by mouth Daily. 9/19/22  Yes Rian Nobles MD   Dulaglutide (Trulicity) 4.5 MG/0.5ML solution pen-injector Inject 0.5 mL under the skin into the appropriate area as directed 1 (One) Time Per Week. 10/17/22   Davi Gandhi MD   Insulin Glargine (BASAGLAR KWIKPEN) 100 UNIT/ML injection pen Inject 50 Units under the skin into the appropriate area as directed Daily for 90 days.  Patient taking differently: Inject 50 Units under the skin into the appropriate area as directed Daily. On 30 right now, maybe increasing 10/14/22 1/17/23  Zack Copeland APRN   tadalafil (CIALIS) 20 MG tablet Take 1 tablet by mouth Daily As Needed for Erectile Dysfunction.    Provider, MD Oleksandr   vitamin D  "(ERGOCALCIFEROL) 1.25 MG (65860 UT) capsule capsule TAKE 1 CAPSULE BY MOUTH 1 TIME EVERY WEEK 2/21/23   Davi Gandhi MD       Allergies:  Patient has no known allergies.    ROS:    General: Weight stable  Resp: No SOA  Cardiovascular: No CP    Objective     Blood pressure 116/75, pulse 70, temperature 97 °F (36.1 °C), temperature source Temporal, resp. rate 18, height 180.3 cm (71\"), weight 96.2 kg (212 lb), SpO2 98 %.    Physical Exam   Constitutional: Pt is oriented to person, place, and in no distress.   Cardiovascular: Normal rate, regular rhythm.    Pulmonary/Chest: Effort normal. No respiratory distress.  Abdominal: Non-distended.  Psychiatric: Mood, memory, affect and judgment appear normal.     Assessment & Plan     Diagnosis:  Damon's    Anticipated Surgical Procedure:  Endoscopy    The risks, benefits, and alternatives of this procedure have been discussed with the patient or the responsible party- the patient understands and agrees to proceed.      EMR Dragon/transcription disclaimer:  Much of this encounter note is electronic transcription/translation of spoken language to printed text.  The electronic translation of spoken language may be erroneous, or at times, nonsensical words or phrases may be inadvertently transcribed.  Although I have reviewed the note for such errors, some may still exist.  "

## 2023-05-23 DIAGNOSIS — E78.2 MIXED HYPERLIPIDEMIA: Chronic | ICD-10-CM

## 2023-05-24 RX ORDER — ATORVASTATIN CALCIUM 20 MG/1
20 TABLET, FILM COATED ORAL DAILY
Qty: 90 TABLET | Refills: 1 | Status: SHIPPED | OUTPATIENT
Start: 2023-05-24

## 2023-07-20 ENCOUNTER — LAB (OUTPATIENT)
Dept: FAMILY MEDICINE CLINIC | Facility: CLINIC | Age: 63
End: 2023-07-20
Payer: COMMERCIAL

## 2023-07-28 ENCOUNTER — OFFICE VISIT (OUTPATIENT)
Dept: FAMILY MEDICINE CLINIC | Facility: CLINIC | Age: 63
End: 2023-07-28
Payer: COMMERCIAL

## 2023-07-28 VITALS
HEIGHT: 71 IN | RESPIRATION RATE: 18 BRPM | HEART RATE: 93 BPM | SYSTOLIC BLOOD PRESSURE: 126 MMHG | DIASTOLIC BLOOD PRESSURE: 78 MMHG | BODY MASS INDEX: 30.24 KG/M2 | WEIGHT: 216 LBS | OXYGEN SATURATION: 98 % | TEMPERATURE: 97.7 F

## 2023-07-28 DIAGNOSIS — N52.9 ERECTILE DYSFUNCTION, UNSPECIFIED ERECTILE DYSFUNCTION TYPE: Chronic | ICD-10-CM

## 2023-07-28 DIAGNOSIS — K21.9 GASTROESOPHAGEAL REFLUX DISEASE, UNSPECIFIED WHETHER ESOPHAGITIS PRESENT: ICD-10-CM

## 2023-07-28 DIAGNOSIS — E11.65 UNCONTROLLED TYPE 2 DIABETES MELLITUS WITH HYPERGLYCEMIA: ICD-10-CM

## 2023-07-28 DIAGNOSIS — N28.9 RENAL INSUFFICIENCY: ICD-10-CM

## 2023-07-28 DIAGNOSIS — E78.2 MIXED HYPERLIPIDEMIA: Chronic | ICD-10-CM

## 2023-07-28 DIAGNOSIS — R69 MULTIPLE CHRONIC DISEASES: ICD-10-CM

## 2023-07-28 DIAGNOSIS — I49.8 OTHER CARDIAC ARRHYTHMIA: ICD-10-CM

## 2023-07-28 DIAGNOSIS — Z79.01 ANTICOAGULATED: ICD-10-CM

## 2023-07-28 DIAGNOSIS — G62.9 NEUROPATHY: Chronic | ICD-10-CM

## 2023-07-28 RX ORDER — TADALAFIL 20 MG/1
20 TABLET ORAL DAILY PRN
Qty: 100 TABLET | Refills: 0 | Status: SHIPPED | OUTPATIENT
Start: 2023-07-28

## 2023-07-28 NOTE — PROGRESS NOTES
Subjective   Abdullahi MACE is a 63 y.o. male presenting with chief complaint of:   Chief Complaint   Patient presents with    Follow-up     Diabetic follow up     AWV NA  Last Completed Annual Wellness Visit       This patient has no relevant Health Maintenance data.             History of Present Illness :  Alone.   Here for review of chronic problems that includes DM2 and others.      Has multiple chronic problems to consider that might have a bearing on today's issues;  an interval appointment.       Chronic/acute problems reviewed today:   1. Other cardiac arrhythmia Chronic/stable.   History of benign PVCs.  Rhythm currently seems controlled.  Medications seem tolerated.  No syncope near syncope.     2. Mixed hyperlipidemia Chronic/stable.  Tolerated use of Rx with labs showing improved lipid values and tolerant liver labs. No muscle aches unexpected.      3. Anticoagulated: CVA/ASA 81 Chronic/stable reason for stopping or use of.  Denies bleeding issues; especially epistaxis, melena, hematochezia.  Upper arms/others do not significantly bruise easily.  No significant bleeding or falls.      4. Gastroesophageal reflux disease, unspecified whether esophagitis present Chronic/stable.  Controlled heartburn, reflux without dysphagia, melena.  Rx used, periods not used proven currently needed with symptoms -currently doing ok.      5. Renal insufficiency Chronic/stable.  Since resolved no nephrology.  No dysuria.  Previously warned/reminded:   To avoid further kidney function decline  A. Treat any time you think you have infection  B. Stay hydrated (dont get dehydrated); drink at least 60 oz fluid every 24 hr (1800 cc or nearly a 2L)  C. Do not allow any xrays with dye WITHOUT the doctor ordering checking your renal function  D. Do not get new medications without the doctor considering your renal condition  E. Do not use motrin/ibuprofen, alleve/naprosyn and these types of medications     6. Neuropathy-offered Rx  previous numbness of the feet that has not worsened.  Watches closely and has no sores right now   7. Multiple chronic diseases,  Has multiple chronic problems with increased risks for management (involves polypharmacy, many required labs/imaging/ to manage)     8. Erectile dysfunction, unspecified erectile dysfunction type Chronic/stable:  Occ use of Rx helps achieve, maintain erections to all for adequate intercourse.  No side effects encountered.  Aware to not use nitroglycerin products 24 hr of Rx.      9. Uncontrolled type 2 diabetes mellitus with hyperglycemia horrible jump in his A1c; from controlled uncontrolled.  No problem/pattern hypoglycemia/hyperglycemia manifest by poly- dypsia, phagia, uria, or sweats, diaphoretic episodes, syncope/near but home numbers went from bad to good when he changed diet (see scanned)       Has an/another acute issue today: none.    The following portions of the patient's history were reviewed and updated as appropriate: allergies, current medications, past family history, past medical history, past social history, past surgical history, and problem list.      Current Outpatient Medications:     aspirin 81 MG EC tablet, Take 1 tablet by mouth Daily., Disp: , Rfl:     atorvastatin (LIPITOR) 20 MG tablet, TAKE 1 TABLET BY MOUTH DAILY, Disp: 90 tablet, Rfl: 1    colesevelam (WELCHOL) 625 MG tablet, Take 3 tablets by mouth 2 (Two) Times a Day With Meals., Disp: 540 tablet, Rfl: 1    Dulaglutide (Trulicity) 4.5 MG/0.5ML solution pen-injector, Inject 0.5 mL under the skin into the appropriate area as directed 1 (One) Time Per Week., Disp: 12 mL, Rfl: 3    insulin aspart (NovoLOG FlexPen) 100 UNIT/ML solution pen-injector sc pen, Inject 7 Units under the skin into the appropriate area as directed 3 (Three) Times a Day With Meals., Disp: 20 mL, Rfl: 3    Insulin Glargine (BASAGLAR TEMPO PEN SC), Inject 30 Units under the skin into the appropriate area as directed., Disp: , Rfl:      Insulin Pen Needle (B-D UF III MINI PEN NEEDLES) 31G X 5 MM misc, 1 Device 4 (Four) Times a Day., Disp: 400 each, Rfl: 3    Jardiance 25 MG tablet tablet, TAKE 1 TABLET DAILY, Disp: 90 tablet, Rfl: 1    lisinopril (PRINIVIL,ZESTRIL) 10 MG tablet, TAKE 1 TABLET BY MOUTH DAILY, Disp: 90 tablet, Rfl: 3    metFORMIN (GLUCOPHAGE) 500 MG tablet, TAKE 2 TABLETS BY MOUTH TWICE DAILY WITH MEALS, Disp: 360 tablet, Rfl: 3    pantoprazole (PROTONIX) 40 MG EC tablet, Take 1 tablet by mouth Daily., Disp: 90 tablet, Rfl: 3    tadalafil (CIALIS) 20 MG tablet, Take 1 tablet by mouth Daily As Needed for Erectile Dysfunction., Disp: , Rfl:     vitamin D (ERGOCALCIFEROL) 1.25 MG (44531 UT) capsule capsule, TAKE 1 CAPSULE BY MOUTH 1 TIME EVERY WEEK, Disp: 5 capsule, Rfl: 0    Insulin Glargine (BASAGLAR KWIKPEN) 100 UNIT/ML injection pen, Inject 50 Units under the skin into the appropriate area as directed Daily for 90 days. (Patient taking differently: Inject 50 Units under the skin into the appropriate area as directed Daily. On 30 right now, maybe increasing), Disp: 45 mL, Rfl: 3      Current Outpatient Medications for HTN    lisinopril (PRINIVIL,ZESTRIL) 10 MG tablet, TAKE 1 TABLET BY MOUTH DAILY, Disp: 90 tablet, Rfl: 3    Current Outpatient Medications for DM:     Dulaglutide (Trulicity) 4.5 MG/0.5ML solution pen-injector, Inject 0.5 mL under the skin into the appropriate area as directed 1 (One) Time Per Week., Disp: 12 mL, Rfl: 3    insulin aspart (NovoLOG FlexPen) 100 UNIT/ML solution pen-injector sc pen, Inject 7 Units under the skin into the appropriate area as directed 3 (Three) Times a Day With Meals., Disp: 20 mL, Rfl: 3    Insulin Glargine (BASAGLAR TEMPO PEN SC), Inject 30 Units under the skin into the appropriate area as directed., Disp: , Rfl:     Jardiance 25 MG tablet tablet, TAKE 1 TABLET DAILY, Disp: 90 tablet, Rfl: 1    metFORMIN (GLUCOPHAGE) 500 MG tablet, TAKE 2 TABLETS BY MOUTH TWICE DAILY WITH MEALS, Disp: 360  tablet, Rfl: 3    No problems with medications.    No Known Allergies    Review of Systems  GENERAL:  Active/slower with limits, speed, stamina for age.  Sleep is ok. No fever now.  ENDO:  No syncope, near or diaphoretic sweaty spells.  BS variable;  home BS and BPs scanned.  HEENT: No head injury or headache.   No vision change.  No significant hearing loss.  Ears without pain/drainage.  No sore throat.  No usual significant nasal/sinus congestion/drainage; worse couple weeks.  No epistaxis.  CHEST: No chest wall tenderness or mass. No usual significant cough, without wheeze.  No SOB; no hemoptysis.  CV: No chest pain, palpitations, ankle edema.  GI: No heartburn, dysphagia.  No abdominal pain, diarrhea, constipation.  No rectal bleeding, or melena.    :  Voids without dysuria, or incontinence to completion.  Weak erections without Rx.   ORTHO: No painful/swollen joints but various on /off sore.  No sore neck or back.  No acute neck or back pain without recent injury.   NEURO: No dizziness, weakness of extremities.    Same LE numbness/paresthesias.     PSYCH: No memory loss.  Mood good; not anxious, depressed   Screening:  Mammogram: NA  Bone density: NA  Low dose CT chest: Tobacco-smoker/never: NA  GI:   Colon-ihem/Parveen/ABDIRAHMAN/9.19.22/5y  EGD-?b-dil/Parveen/ABDIRAHMAN/9.19.22/2m  Prostate: 1.17.23 ok-voiding ok  10.17.22 ro deferred  12.23.20  12.11.19 AUA 3/1  7.13.18  Usual lab order  6m CMP, A1c  12m CBC, CMP, A1c, LIPID, TSH, PSAs, B12, folate    Copy/paste function used for ROS/exam AND each area of these were reviewed, updated, confirmed and supplemented as needed.  Data reviewed:   Recent admit/ER/MD visits: 1.17.23    1. Anticoagulated: CVA/ASA 81    2. Mixed hyperlipidemia    3. Primary hypertension    4. Vitamin D deficiency    5. Encounter for prostate cancer screening    6. Type 2 diabetes mellitus with hyperglycemia, with long-term current use of insulin (HCC)       Data review above:   Discussions/medical  decisions/reviews:  BP ok  Other vitals ok  DM/BS 8.7 10.15.22 (improved 10.4)  Lipid LDL 76 10.15.22-lipitor  PSA due  CBC ok 10.14.22  Renal improved 10.14.22  Liver ok 10.14.22  Vit D sometime  Thyroid sometime     Fasting lab when can  Will report if LUE gets weaker; would suggest EMG/NCV at that point     Data review above:   Rx: reviewed and decisions:   Rx new/changes: none  No orders of the defined types were placed in this encounter.     Orders placed:   LAB/Testing/Referrals: reviewed/orders:   Today: future      Orders Placed This Encounter   Procedures    PSA Screen    Comprehensive Metabolic Panel    TSH    T4, Free    Vitamin D,25-Hydroxy    CBC & Differential       Last cardiac testing:   Echo:   Results for orders placed during the hospital encounter of 10/14/22    Adult Transthoracic Echo Complete W/ Cont if Necessary Per Protocol (With Agitated Saline)    Interpretation Summary    Left ventricular ejection fraction appears to be 46 - 50%. Left ventricular systolic function is mildly decreased.    Left ventricular diastolic function was normal.    Saline test results are negative.    Estimated right ventricular systolic pressure from tricuspid regurgitation is normal (<35 mmHg).    Mild dilation of the aortic root is present.    Radiology considered:   No radiology results for the last 90 days.    Lab Results:  Results for orders placed or performed during the hospital encounter of 04/06/23   POC Glucose Once    Specimen: Blood   Result Value Ref Range    Glucose 167 (H) 70 - 130 mg/dL   Tissue Pathology Exam    Specimen: A: Esophagus; Tissue    B: Esophagus; Tissue   Result Value Ref Range    Note to Patients       This report may contain a detailed description of human tissue sent by a health care provider to the laboratory for pathologic evaluation. The content of this report is essential for diagnosis and may provide important critical findings. This information may be unfamiliar to patients to  "review without a medical professional present. It is advised that the patient review this report in the presence of a health care provider who can answer questions and explain the results.      Case Report       Surgical Pathology Report                         Case: [id number]                                  Authorizing Provider:  Rian Nobles MD      Collected:           04/06/2023 09:25 AM          Ordering Location:     [facility]     Received:            04/06/2023 12:40 PM                                 ENDOSCOPY                                                                    Pathologist:           Carina Sorenson MD                                                        Specimens:   1) - Esophagus, Bx at 39cm                                                                          2) - Esophagus, Bx at 38cm                                                                 Final Diagnosis       1.  Esophagus at 39 cm, endoscopic biopsy:  A.  Fragments of benign squamous mucosa, benign glandular mucosa and benign squamocolumnar junction mucosa exhibiting intestinal metaplasia (Damon's esophagus).  B.  Chronic active inflammation, moderate.  C.  No dysplasia identified.    2.  Esophagus at 38 cm, endoscopic biopsy:  A.  Fragments of benign squamous mucosa, benign glandular mucosa and benign squamocolumnar junction mucosa exhibiting intestinal metaplasia (Damon's esophagus).  B.  Chronic active inflammation, moderate.  C.  No dysplasia identified.      Gross Description       1. Esophagus.  Received in a formalin filled container labeled with the patient's name, date of birth, and \"biopsy at 39 cm\".  The specimen consists of 4 yellow to gray soft tissue fragments aggregating to 0.5 x 0.4 by less than 0.1 cm.  The fragments are inked with hematoxylin and totally submitted in block 1A.    2. Esophagus.   Received in a formalin filled container labeled with the patient's name, date of " "birth, and \"biopsy at 38 cm\".  The specimen consists of 4 yellow-tan soft tissue fragments aggregating to 0.4 x 0.3 by less than 0.1 cm.  The fragments are inked with hematoxylin and totally submitted in block 2A.        Microscopic Description       Microscopic examination was performed.         A1C:  Lab Results - Last 18 Months   Lab Units 07/20/23  0907 01/19/23  0711 10/15/22  0413   HEMOGLOBIN A1C % 11.80* 7.40* 8.70*     GLUCOSE:  Lab Results - Last 18 Months   Lab Units 01/19/23  0711 10/14/22  1719   GLUCOSE mg/dL 102* 206*     LIPID:  Lab Results - Last 18 Months   Lab Units 10/15/22  0413   LDL CHOL mg/dL 76   HDL CHOL mg/dL 33*   TRIGLYCERIDES mg/dL 85     PSA:  Lab Results   Component Value Date/Time    PSA 0.615 01/19/2023 07:11 AM    PSA 0.677 12/21/2020 07:04 AM    PSA 0.488 12/04/2019 07:19 AM    PSA 0.518 07/11/2018 08:32 AM    PSA 0.404 12/02/2016 08:21 AM       CBC:  Lab Results - Last 18 Months   Lab Units 01/19/23  0711 10/14/22  1719   WBC 10*3/mm3 7.42 7.74   HEMOGLOBIN g/dL 15.9 16.9   HEMATOCRIT % 47.2 47.5   PLATELETS 10*3/mm3 160 182     BMP/CMP:  Lab Results - Last 18 Months   Lab Units 01/19/23  0711 10/14/22  1719   SODIUM mmol/L 138 136   POTASSIUM mmol/L 4.9 4.6   CHLORIDE mmol/L 100 100   CO2 mmol/L 27.7 22.0   GLUCOSE mg/dL 102* 206*   BUN mg/dL 17 18   CREATININE mg/dL 1.15 1.19   EGFR RESULT mL/min/1.73 72.9  --    CALCIUM mg/dL 9.7 9.6       HEPATIC:  Lab Results - Last 18 Months   Lab Units 01/19/23  0711 10/14/22  1719   ALT (SGPT) U/L 14 22   AST (SGOT) U/L 20 26   ALK PHOS U/L 102 109     HEPATITIS C ANTIBODY:   Lab Results   Component Value Date/Time    HEPCVIRUSABY 0.2 07/11/2018 08:32 AM     Vit D:  Lab Results - Last 18 Months   Lab Units 03/23/23  0812 01/19/23  0711   VIT D 25 HYDROXY ng/ml 41.1 13.8*     THYROID:  Lab Results - Last 18 Months   Lab Units 01/19/23  0711   TSH uIU/mL 2.120   FREE T4 ng/dL 1.23       Objective   /78 (BP Location: Right arm, Patient " "Position: Sitting, Cuff Size: Adult)   Pulse 93   Temp 97.7 °F (36.5 °C) (Temporal)   Resp 18   Ht 180.3 cm (71\")   Wt 98 kg (216 lb)   SpO2 98%   BMI 30.13 kg/m²   Body mass index is 30.13 kg/m².    Recent Vitals         4/6/2023 4/6/2023 4/6/2023       BP: 96/66 93/69 103/74     Pulse: 74 75 71           Wt Readings from Last 15 Encounters:   07/28/23 1359 98 kg (216 lb)   04/06/23 0801 96.2 kg (212 lb)   01/17/23 1136 96.7 kg (213 lb 3.2 oz)   12/19/22 1504 96.4 kg (212 lb 9.6 oz)   10/17/22 1504 97.2 kg (214 lb 3.2 oz)   10/15/22 1055 98 kg (216 lb)   10/14/22 2034 95.5 kg (210 lb 8 oz)   10/14/22 1709 98 kg (216 lb)   10/14/22 1828 97.5 kg (215 lb)   09/19/22 1117 97.5 kg (215 lb)   09/08/22 0833 96.6 kg (213 lb)   09/20/21 1038 91.6 kg (202 lb)   06/23/21 0930 90.7 kg (200 lb)   12/23/20 0902 91.8 kg (202 lb 6.4 oz)   12/11/19 0905 88 kg (194 lb)   11/12/19 1051 88.5 kg (195 lb)   11/04/19 0933 88.5 kg (195 lb)       Physical Exam  GENERAL:  Well nourished/developed in no acute distress. Obese   SKIN: Turgor excellent, without wound, rash, lesion    HEENT: Normal cephalic without trauma.  Pupils equal round reactive to light. Extraocular motions full without nystagmus.   External canals nonobstructive nontender without reddness. Tymphatic membranes vale with ottoniel structures intact.   Oral cavity without growths, exudates, and moist.  Posterior pharynx without mass, obstruction, redness.  No thyromegaly, mass, tenderness, lymphadenopathy and supple.  CV: Regular rhythm.  No murmur, gallop,  edema. Posterior pulses intact/weakly.  No carotid bruits.  CHEST: No chest wall tenderness or mass.   LUNGS: Symmetric motion with clear to auscultation.   ABD: Soft, nontender without mass.    ORTHO: Symmetric extremities without swelling/point tenderness.  Full gross range of motion.  NEURO: CN 2-12 grossly intact.  Symmetric facies. 1/4 x bicep knee equal reflexes.  UE/LE   3-4/5 strength throughout.  Nonfocal " use extremities. Speech clear.  Reduced light touch with monofilament, vibratory sensation with tuning fork; equal toes/distal feet.    PSYCH: Oriented x 3.  Pleasant calm, well kept.  Purposeful/directed conservation with intact short/long gross memory    Diabetic foot exam:   Left: Filament test decreased   Pulses Dorsalis Pedis:  diminished   Reflexes 1+    Vibratory sensation diminished   Proprioception diminished   Sharp/dull discrimination diminished       Right: Filament test decreased   Pulses Dorsalis Pedis:  diminished   Reflexes 1+    Vibratory sensation diminished   Proprioception diminished   Sharp/dull discrimination diminished     Assessment & Plan     1. Other cardiac arrhythmia    2. Mixed hyperlipidemia    3. Anticoagulated: CVA/ASA 81    4. Gastroesophageal reflux disease, unspecified whether esophagitis present    5. Renal insufficiency    6. Neuropathy-offered Rx    7. Multiple chronic diseases    8. Erectile dysfunction, unspecified erectile dysfunction type    9. Uncontrolled type 2 diabetes mellitus with hyperglycemia        Data review above:   Discussions/medical decisions/reviews:  BP ok  Other vitals weight up  Recent Vitals         4/6/2023 4/6/2023 4/6/2023       BP: 96/66 93/69 103/74     Pulse: 74 75 71           DM/A1c 11.8 7.20.23  DM/ 1.19.23  Lipid LDL 76 10.15.22; lipitor 20  PSA ok 1.19.23  CBC ok 1.19.23  Renal ok 1.19.23  Liver ok 1.19.23  Vit D 41  Thyroid  3.23.23    Screening reviewed/updated   Vaccines discussed shingles free  May have Cialis 20; [facility]  2m trial better diet then A1c (CMP also)    Data review above:   Rx: reviewed and decisions:   Rx new/changes: trial cialis 20  New Medications Ordered This Visit   Medications    tadalafil (Cialis) 20 MG tablet     Sig: Take 1 tablet by mouth Daily As Needed for Erectile Dysfunction.     Dispense:  100 tablet     Refill:  0     Orders placed:   LAB/Testing/Referrals: reviewed/orders:   Today:   Orders Placed  "This Encounter   Procedures    Hemoglobin A1c    Comprehensive Metabolic Panel    Microalbumin / Creatinine Urine Ratio - Urine, Clean Catch     Chronic/recurrent labs above or change to:   Same     Immunization History   Administered Date(s) Administered    COVID-19 (MODERNA) 1st,2nd,3rd Dose Monovalent 08/02/2021, 08/30/2021    Flu Vaccine Intradermal Quad 18-64YR 12/11/2019    Flucelvax Quad Vial =>4yrs 12/11/2019     We advised/reaffirmed our support/suggestion for staying complete with covid- covid boosters, seasonal flu/yearly and any missing vaccine from list we supplied; we suggest contact with local health department office to review missing/needed vaccines and then bring nursing documentation for these vaccines to this office or call this information in. Shingles became \"free\" 1.1.23 for medicare insurance.    Health maintenance:   Body mass index is 30.13 kg/m².         Tobacco use reviewed:   Abdullahi MACE  reports that he has never smoked. He has never used smokeless tobacco..     There are no Patient Instructions on file for this visit.    Follow up: Return for lab 2m then lab/Dr Gandhi 6m.  Future Appointments   Date Time Provider Department Center   2/28/2024  1:30 PM Davi Gandhi MD [facility]               "

## 2023-09-19 RX ORDER — COLESEVELAM 180 1/1
TABLET ORAL
Qty: 540 TABLET | Refills: 1 | Status: SHIPPED | OUTPATIENT
Start: 2023-09-19

## 2023-12-04 DIAGNOSIS — E11.65 UNCONTROLLED TYPE 2 DIABETES MELLITUS WITH HYPERGLYCEMIA: ICD-10-CM

## 2023-12-04 RX ORDER — EMPAGLIFLOZIN 25 MG/1
TABLET, FILM COATED ORAL
Qty: 90 TABLET | Refills: 1 | Status: SHIPPED | OUTPATIENT
Start: 2023-12-04

## 2023-12-05 DIAGNOSIS — I10 ESSENTIAL HYPERTENSION: Chronic | ICD-10-CM

## 2023-12-05 RX ORDER — LISINOPRIL 10 MG/1
10 TABLET ORAL DAILY
Qty: 90 TABLET | Refills: 3 | Status: SHIPPED | OUTPATIENT
Start: 2023-12-05

## 2024-01-06 DIAGNOSIS — E11.65 UNCONTROLLED TYPE 2 DIABETES MELLITUS WITH HYPERGLYCEMIA: ICD-10-CM

## 2024-01-08 RX ORDER — INSULIN GLARGINE 100 [IU]/ML
INJECTION, SOLUTION SUBCUTANEOUS
Qty: 45 ML | Refills: 3 | Status: SHIPPED | OUTPATIENT
Start: 2024-01-08

## 2024-01-08 NOTE — TELEPHONE ENCOUNTER
Rx Refill Note  Requested Prescriptions     Pending Prescriptions Disp Refills    Insulin Glargine (BASAGLAR KWIKPEN) 100 UNIT/ML injection pen [Pharmacy Med Name: BASAGLAR KWK PEN 100U/ML] 45 mL 3     Sig: INJECT 50 UNITS            SUBCUTANEOUSLY INTO THE    APPROPRIATE AREA AS        DIRECTED DAILY      Last office visit with prescribing clinician: 12/19/2022   Last telemedicine visit with prescribing clinician: Visit date not found   Next office visit with prescribing clinician: Visit date not found                         Would you like a call back once the refill request has been completed: [] Yes [] No    If the office needs to give you a call back, can they leave a voicemail: [] Yes [] No    Deidre Burks Rep  01/08/24, 08:18 CST

## 2024-01-16 DIAGNOSIS — Z79.4 TYPE 2 DIABETES MELLITUS WITH HYPERGLYCEMIA, WITH LONG-TERM CURRENT USE OF INSULIN: ICD-10-CM

## 2024-01-16 DIAGNOSIS — E11.65 TYPE 2 DIABETES MELLITUS WITH HYPERGLYCEMIA, WITH LONG-TERM CURRENT USE OF INSULIN: ICD-10-CM

## 2024-01-16 RX ORDER — DULAGLUTIDE 4.5 MG/.5ML
INJECTION, SOLUTION SUBCUTANEOUS
Qty: 6 ML | Refills: 3 | Status: SHIPPED | OUTPATIENT
Start: 2024-01-16 | End: 2024-01-18 | Stop reason: RX

## 2024-01-18 ENCOUNTER — TELEPHONE (OUTPATIENT)
Dept: FAMILY MEDICINE CLINIC | Facility: CLINIC | Age: 64
End: 2024-01-18
Payer: COMMERCIAL

## 2024-01-18 RX ORDER — SEMAGLUTIDE 1.34 MG/ML
1 INJECTION, SOLUTION SUBCUTANEOUS WEEKLY
Qty: 9 ML | Refills: 3 | Status: SHIPPED | OUTPATIENT
Start: 2024-01-18

## 2024-01-18 NOTE — TELEPHONE ENCOUNTER
He was on a high dose of Trulicity, he will need to come by and get a sample of Ozempic of the low-dose--placed reserve note on a vial in the refrigerator here.  He needs to take 0.25 mg weekly x 2 then 0.5 mg weekly x 2, then his next fill needs to come from his pharmacy and be the 1 mg dosage.    Electronically signed by NICHOLAS Ramon, 01/18/24, 9:07 AM TRENT.

## 2024-01-18 NOTE — TELEPHONE ENCOUNTER
We received a fax from [facility] stating Trulicity is on back order so they have sent a request for an alternate medication. Please advise

## 2024-01-23 DIAGNOSIS — E78.2 MIXED HYPERLIPIDEMIA: Chronic | ICD-10-CM

## 2024-01-23 RX ORDER — ATORVASTATIN CALCIUM 20 MG/1
20 TABLET, FILM COATED ORAL DAILY
Qty: 90 TABLET | Refills: 1 | Status: SHIPPED | OUTPATIENT
Start: 2024-01-23

## 2024-01-23 NOTE — TELEPHONE ENCOUNTER
Rx Refill Note  Requested Prescriptions     Pending Prescriptions Disp Refills    atorvastatin (LIPITOR) 20 MG tablet [Pharmacy Med Name: ATORVASTATIN 20MG TABLETS] 90 tablet 1     Sig: TAKE 1 TABLET BY MOUTH DAILY      Last office visit with prescribing clinician: 7/28/2023   Last telemedicine visit with prescribing clinician: Visit date not found   Next office visit with prescribing clinician: 2/28/2024                         Would you like a call back once the refill request has been completed: [] Yes [] No    If the office needs to give you a call back, can they leave a voicemail: [] Yes [] No    Bonnie Wylie MA  01/23/24, 15:14 CST

## 2024-02-28 ENCOUNTER — OFFICE VISIT (OUTPATIENT)
Dept: FAMILY MEDICINE CLINIC | Facility: CLINIC | Age: 64
End: 2024-02-28
Payer: COMMERCIAL

## 2024-02-28 VITALS
HEIGHT: 71 IN | DIASTOLIC BLOOD PRESSURE: 68 MMHG | SYSTOLIC BLOOD PRESSURE: 118 MMHG | RESPIRATION RATE: 18 BRPM | HEART RATE: 103 BPM | WEIGHT: 217 LBS | OXYGEN SATURATION: 99 % | BODY MASS INDEX: 30.38 KG/M2 | TEMPERATURE: 97.1 F

## 2024-02-28 DIAGNOSIS — Z79.4 CONTROLLED TYPE 2 DIABETES MELLITUS WITH DIABETIC POLYNEUROPATHY, WITH LONG-TERM CURRENT USE OF INSULIN: ICD-10-CM

## 2024-02-28 DIAGNOSIS — N52.9 ERECTILE DYSFUNCTION, UNSPECIFIED ERECTILE DYSFUNCTION TYPE: Chronic | ICD-10-CM

## 2024-02-28 DIAGNOSIS — G62.9 NEUROPATHY: Chronic | ICD-10-CM

## 2024-02-28 DIAGNOSIS — I49.8 OTHER CARDIAC ARRHYTHMIA: ICD-10-CM

## 2024-02-28 DIAGNOSIS — E11.42 CONTROLLED TYPE 2 DIABETES MELLITUS WITH DIABETIC POLYNEUROPATHY, WITH LONG-TERM CURRENT USE OF INSULIN: ICD-10-CM

## 2024-02-28 DIAGNOSIS — E78.2 MIXED HYPERLIPIDEMIA: Chronic | ICD-10-CM

## 2024-02-28 DIAGNOSIS — Z79.01 ANTICOAGULATED: ICD-10-CM

## 2024-02-28 DIAGNOSIS — K21.9 GASTROESOPHAGEAL REFLUX DISEASE, UNSPECIFIED WHETHER ESOPHAGITIS PRESENT: ICD-10-CM

## 2024-02-28 DIAGNOSIS — R69 MULTIPLE CHRONIC DISEASES: ICD-10-CM

## 2024-02-28 DIAGNOSIS — I10 PRIMARY HYPERTENSION: Chronic | ICD-10-CM

## 2024-02-28 DIAGNOSIS — E83.52 HYPERCALCEMIA: ICD-10-CM

## 2024-02-28 RX ORDER — INSULIN ASPART 100 [IU]/ML
7 INJECTION, SOLUTION INTRAVENOUS; SUBCUTANEOUS
Qty: 20 ML | Refills: 3 | Status: SHIPPED | OUTPATIENT
Start: 2024-02-28

## 2024-02-28 RX ORDER — SILDENAFIL 100 MG/1
100 TABLET, FILM COATED ORAL DAILY PRN
Qty: 10 TABLET | Refills: 2 | Status: SHIPPED | OUTPATIENT
Start: 2024-02-28

## 2024-02-28 RX ORDER — INSULIN GLARGINE 100 [IU]/ML
50 INJECTION, SOLUTION SUBCUTANEOUS DAILY
Qty: 45 ML | Refills: 3 | Status: SHIPPED | OUTPATIENT
Start: 2024-02-28

## 2024-02-28 NOTE — PROGRESS NOTES
ROSITA”.   Subjective   Abdullahi MACE is a 63 y.o. male presenting with chief complaint of:   Chief Complaint   Patient presents with    Follow-up     AWV none  Last Completed Annual Wellness Visit       This patient has no relevant Health Maintenance data.             History of Present Illness :  Alone.   Here for review of chronic problems that includes DM2 and others.    Has multiple chronic problems to consider that might have a bearing on today's issues;  an interval appointment.       Chronic/acute problems reviewed today:   1. Primary hypertension Chronic/stable. Stable here past/no recent home blood pressures.  No significant chest pain, SOB, LE edema, orthopnea, near syncope, dizziness/light headness.   Recent Vitals         4/6/2023 7/28/2023 2/28/2024       BP: 103/74 126/78 118/68     Pulse: 71 93 103     Temp: -- 97.7 °F (36.5 °C) 97.1 °F (36.2 °C)     Weight: -- 98 kg (216 lb) 98.4 kg (217 lb)     BMI (Calculated): -- 30.1 30.3              2. Other cardiac arrhythmia Chronic/stable.   History of benign PVCs.  Rhythm currently seems controlled.  Medications seem tolerated.  No syncope near syncope.     3. Mixed hyperlipidemia Chronic/stable.  Tolerated use of Rx with labs showing improved lipid values and tolerant liver labs. No muscle aches unexpected.      4. Anticoagulated: CVA/ASA 81 Chronic/stable reason for stopping or use of.  Denies bleeding issues; especially epistaxis, melena, hematochezia.  Upper arms/others do not significantly bruise easily.  No significant bleeding or falls.      5. Gastroesophageal reflux disease, unspecified whether esophagitis present Chronic/stable.  Controlled heartburn, reflux without dysphagia, melena.  Rx used, periods not used proven currently needed with symptoms -currently doing ok.      6. Erectile dysfunction, unspecified erectile dysfunction type chronic variable difficulty with erections; was unable to get the 100 Cialis 20 for a reasonable price at  [place] [facility]    7. Hypercalcemia-watching once had an of a calcium; resolved with no sequela note   8. Neuropathy-offered Rx chronic lower extremity numbness stinging or tingling at 1 time contributing to a large diabetic foot ulcer   9. Multiple chronic diseases Has multiple chronic problems with increased risks for management (involves polypharmacy, many required labs/imaging/ to manage)     10. Controlled type 2 diabetes mellitus with diabetic polyneuropathy, with long-term current use of insulin Chronic/variable. No problem/pattern hypoglycemia/hyperglycemia manifest by poly- dypsia, phagia, uria, or sweats, diaphoretic episodes, syncope/near.       Has an/another acute issue today: none.    The following portions of the patient's history were reviewed and updated as appropriate: allergies, current medications, past family history, past medical history, past social history, past surgical history, and problem list.      Current Outpatient Medications:     aspirin 81 MG EC tablet, Take 1 tablet by mouth Daily., Disp: , Rfl:     atorvastatin (LIPITOR) 20 MG tablet, TAKE 1 TABLET BY MOUTH DAILY, Disp: 90 tablet, Rfl: 1    colesevelam (WELCHOL) 625 MG tablet, TAKE 3 TABLETS BY MOUTH TWICE DAILY WITH MEALS, Disp: 540 tablet, Rfl: 1    insulin aspart (NovoLOG FlexPen) 100 UNIT/ML solution pen-injector sc pen, Inject 7 Units under the skin into the appropriate area as directed 3 (Three) Times a Day With Meals., Disp: 20 mL, Rfl: 3    Insulin Glargine (BASAGLAR KWIKPEN) 100 UNIT/ML injection pen, INJECT 50 UNITS            SUBCUTANEOUSLY INTO THE    APPROPRIATE AREA AS        DIRECTED DAILY, Disp: 45 mL, Rfl: 3    Insulin Glargine (BASAGLAR TEMPO PEN SC), Inject 30 Units under the skin into the appropriate area as directed., Disp: , Rfl:     Insulin Pen Needle (B-D UF III MINI PEN NEEDLES) 31G X 5 MM misc, 1 Device 4 (Four) Times a Day., Disp: 400 each, Rfl: 3    Jardiance 25 MG tablet tablet, TAKE 1 TABLET DAILY,  Disp: 90 tablet, Rfl: 1    lisinopril (PRINIVIL,ZESTRIL) 10 MG tablet, TAKE 1 TABLET BY MOUTH DAILY, Disp: 90 tablet, Rfl: 3    metFORMIN (GLUCOPHAGE) 500 MG tablet, TAKE 2 TABLETS BY MOUTH TWICE DAILY WITH MEALS, Disp: 360 tablet, Rfl: 3    pantoprazole (PROTONIX) 40 MG EC tablet, Take 1 tablet by mouth Daily., Disp: 90 tablet, Rfl: 3    Semaglutide, 1 MG/DOSE, (Ozempic, 1 MG/DOSE,) 4 MG/3ML solution pen-injector, Inject 1 mg under the skin into the appropriate area as directed 1 (One) Time Per Week., Disp: 9 mL, Rfl: 3    tadalafil (Cialis) 20 MG tablet, Take 1 tablet by mouth Daily As Needed for Erectile Dysfunction., Disp: 100 tablet, Rfl: 0    vitamin D (ERGOCALCIFEROL) 1.25 MG (88978 UT) capsule capsule, TAKE 1 CAPSULE BY MOUTH 1 TIME EVERY WEEK, Disp: 5 capsule, Rfl: 0    No problems with medications.    No Known Allergies    Review of Systems  GENERAL:  Active/slower with limits, speed, stamina for age.  Sleep is ok. No fever now.  ENDO:  No syncope, near or diaphoretic sweaty spells.  BS variable verbal report.   HEENT: No head injury or headache.   No vision change.  No significant hearing loss.  Ears without pain/drainage.  No sore throat.  No usual significant nasal/sinus congestion/drainage; worse couple weeks.  No epistaxis.  CHEST: No chest wall tenderness or mass. No usual significant cough, without wheeze.  No SOB; no hemoptysis.  CV: No chest pain, palpitations, ankle edema.  GI: No heartburn, dysphagia.  No abdominal pain, diarrhea, constipation.  No rectal bleeding, or melena.    :  Voids without dysuria, or incontinence to completion.  Weak erections without Rx.   ORTHO: No painful/swollen joints but various on /off sore.  No sore neck or back.  No acute neck or back pain without recent injury.   NEURO: No dizziness, weakness of extremities.    Same LE numbness/paresthesias.     PSYCH: No memory loss.  Mood good; not anxious, depressed   Screening:  Mammogram: NA  Bone density: NA  Low dose CT  chest: Tobacco-smoker/never: NA  GI:   Colon-ihem/Shieben/BH/9.19.22/5y  EGD-?b-dil/Shieben/BH/9.19.22/2m  EGD-b/Shieben/BH/4.6.23/3y  Prostate: voiding ok; exam ok  1.17.23 ok-voiding ok  10.17.22 ro deferred  12.23.20  12.11.19 AUA 3/1  7.13.18  Usual lab order  6m CMP, A1c  12m CBC, CMP, A1c, LIPID, TSH, PSAs, B12, folate    Copy/paste function used for ROS/exam AND each area of these were reviewed, updated, confirmed and supplemented as needed.  Data reviewed:   Recent admit/ER/MD visits: 7.28.23    1. Other cardiac arrhythmia    2. Mixed hyperlipidemia    3. Anticoagulated: CVA/ASA 81    4. Gastroesophageal reflux disease, unspecified whether esophagitis present    5. Renal insufficiency    6. Neuropathy-offered Rx    7. Multiple chronic diseases    8. Erectile dysfunction, unspecified erectile dysfunction type    9. Uncontrolled type 2 diabetes mellitus with hyperglycemia          Data review above:   Discussions/medical decisions/reviews:  BP ok  Other vitals weight up  Recent Vitals           4/6/2023 4/6/2023 4/6/2023          BP: 96/66 93/69 103/74       Pulse: 74 75 71               DM/A1c 11.8 7.20.23  DM/ 1.19.23  Lipid LDL 76 10.15.22; lipitor 20  PSA ok 1.19.23  CBC ok 1.19.23  Renal ok 1.19.23  Liver ok 1.19.23  Vit D 41  Thyroid  3.23.23     Screening reviewed/updated   Vaccines discussed shingles free  May have Cialis 20; [facility]  2m trial better diet then A1c (CMP also)     Data review above:   Rx: reviewed and decisions:   Rx new/changes: trial cialis 20       New Medications Ordered This Visit   Medications    tadalafil (Cialis) 20 MG tablet       Sig: Take 1 tablet by mouth Daily As Needed for Erectile Dysfunction.       Dispense:  100 tablet       Refill:  0      Orders placed:   LAB/Testing/Referrals: reviewed/orders:   Today:       Orders Placed This Encounter   Procedures    Hemoglobin A1c    Comprehensive Metabolic Panel    Microalbumin / Creatinine Urine Ratio - Urine, Clean  Catch       Last cardiac testing:   Echo:   Results for orders placed during the hospital encounter of 10/14/22    Adult Transthoracic Echo Complete W/ Cont if Necessary Per Protocol (With Agitated Saline)    Interpretation Summary    Left ventricular ejection fraction appears to be 46 - 50%. Left ventricular systolic function is mildly decreased.    Left ventricular diastolic function was normal.    Saline test results are negative.    Estimated right ventricular systolic pressure from tricuspid regurgitation is normal (<35 mmHg).    Mild dilation of the aortic root is present.      Radiology considered:   No radiology results for the last 90 days.    Lab Results:  Results for orders placed or performed in visit on 08/02/23   Hemoglobin A1c    Specimen: Blood   Result Value Ref Range    Hemoglobin A1C 7.10 (H) 4.80 - 5.60 %   Comprehensive Metabolic Panel    Specimen: Blood   Result Value Ref Range    Glucose 96 65 - 99 mg/dL    BUN 22 8 - 23 mg/dL    Creatinine 1.25 0.76 - 1.27 mg/dL    EGFR Result 65.5 >60.0 mL/min/1.73    BUN/Creatinine Ratio 17.6 7.0 - 25.0    Sodium 137 136 - 145 mmol/L    Potassium 5.1 3.5 - 5.2 mmol/L    Chloride 103 98 - 107 mmol/L    Total CO2 22.9 22.0 - 29.0 mmol/L    Calcium 10.0 8.6 - 10.5 mg/dL    Total Protein 7.2 6.0 - 8.5 g/dL    Albumin 4.9 3.5 - 5.2 g/dL    Globulin 2.3 gm/dL    A/G Ratio 2.1 g/dL    Total Bilirubin 0.4 0.0 - 1.2 mg/dL    Alkaline Phosphatase 106 39 - 117 U/L    AST (SGOT) 21 1 - 40 U/L    ALT (SGPT) 18 1 - 41 U/L       A1C:  Lab Results - Last 18 Months   Lab Units 10/06/23  0751 07/20/23  0907 01/19/23  0711 10/15/22  0413   HEMOGLOBIN A1C % 7.10* 11.80* 7.40* 8.70*     GLUCOSE:  Lab Results - Last 18 Months   Lab Units 10/06/23  0751 01/19/23  0711 10/14/22  1719   GLUCOSE mg/dL 96 102* 206*     LIPID:  Lab Results - Last 18 Months   Lab Units 10/15/22  0413   LDL CHOL mg/dL 76   HDL CHOL mg/dL 33*   TRIGLYCERIDES mg/dL 85     PSA:  Lab Results   Component  "Value Date/Time    PSA 0.615 01/19/2023 07:11 AM    PSA 0.677 12/21/2020 07:04 AM    PSA 0.488 12/04/2019 07:19 AM    PSA 0.518 07/11/2018 08:32 AM    PSA 0.404 12/02/2016 08:21 AM       CBC:  Lab Results - Last 18 Months   Lab Units 01/19/23  0711 10/14/22  1719   WBC 10*3/mm3 7.42 7.74   HEMOGLOBIN g/dL 15.9 16.9   HEMATOCRIT % 47.2 47.5   PLATELETS 10*3/mm3 160 182     BMP/CMP:  Lab Results - Last 18 Months   Lab Units 10/06/23  0751 01/19/23  0711 10/14/22  1719   SODIUM mmol/L 137 138 136   POTASSIUM mmol/L 5.1 4.9 4.6   CHLORIDE mmol/L 103 100 100   CO2 mmol/L 22.9 27.7 22.0   GLUCOSE mg/dL 96 102* 206*   BUN mg/dL 22 17 18   CREATININE mg/dL 1.25 1.15 1.19   EGFR RESULT mL/min/1.73 65.5 72.9  --    CALCIUM mg/dL 10.0 9.7 9.6       HEPATIC:  Lab Results - Last 18 Months   Lab Units 10/06/23  0751 01/19/23  0711 10/14/22  1719   ALT (SGPT) U/L 18 14 22   AST (SGOT) U/L 21 20 26   ALK PHOS U/L 106 102 109     HEPATITIS C ANTIBODY:   Lab Results   Component Value Date/Time    HEPCVIRUSABY 0.2 07/11/2018 08:32 AM     Vit D:  Lab Results - Last 18 Months   Lab Units 03/23/23  0812 01/19/23  0711   VIT D 25 HYDROXY ng/ml 41.1 13.8*     THYROID:  Lab Results - Last 18 Months   Lab Units 01/19/23  0711   TSH uIU/mL 2.120   FREE T4 ng/dL 1.23       Objective   /68   Pulse 103   Temp 97.1 °F (36.2 °C) (Temporal)   Resp 18   Ht 180.3 cm (71\")   Wt 98.4 kg (217 lb)   SpO2 99%   BMI 30.27 kg/m²   Body mass index is 30.27 kg/m².    Recent Vitals         4/6/2023/6/2023 7/28/2023 2/28/2024       BP: 103/74 126/78 118/68     Pulse: 71 93 103     Temp: -- 97.7 °F (36.5 °C) 97.1 °F (36.2 °C)     Weight: -- 98 kg (216 lb) 98.4 kg (217 lb)     BMI (Calculated): -- 30.1 30.3           Wt Readings from Last 15 Encounters:   02/28/24 1326 98.4 kg (217 lb)   07/28/23 1359 98 kg (216 lb)   04/06/23 0801 96.2 kg (212 lb)   01/17/23 1136 96.7 kg (213 lb 3.2 oz)   12/19/22 1504 96.4 kg (212 lb 9.6 oz)   10/17/22 1504 97.2 kg (214 " lb 3.2 oz)   10/15/22 1055 98 kg (216 lb)   10/14/22 2034 95.5 kg (210 lb 8 oz)   10/14/22 1709 98 kg (216 lb)   10/14/22 1828 97.5 kg (215 lb)   09/19/22 1117 97.5 kg (215 lb)   09/08/22 0833 96.6 kg (213 lb)   09/20/21 1038 91.6 kg (202 lb)   06/23/21 0930 90.7 kg (200 lb)   12/23/20 0902 91.8 kg (202 lb 6.4 oz)   12/11/19 0905 88 kg (194 lb)   11/12/19 1051 88.5 kg (195 lb)       Physical Exam  GENERAL:  Well nourished/developed in no acute distress. Obese   SKIN: Turgor excellent, without wound, rash, lesion    HEENT: Normal cephalic without trauma.  Pupils equal round reactive to light. Extraocular motions full without nystagmus.   External canals nonobstructive nontender without reddness. Tymphatic membranes vale with ottoniel structures intact.   Oral cavity without growths, exudates, and moist.  Posterior pharynx without mass, obstruction, redness.  No thyromegaly, mass, tenderness, lymphadenopathy and supple.  CV: Regular rhythm.  No murmur, gallop,  edema. Posterior pulses intact/weakly.  No carotid bruits.  CHEST: No chest wall tenderness or mass.   LUNGS: Symmetric motion with clear to auscultation.   ABD: Soft, nontender without mass.    PROSTATE: No visible/palpable lesion/tenderness and anal tone intact/full.  Prostate 20 ml/smooth and nontender.  No rectal mass within reach. Stool on glove brown.   ORTHO: Symmetric extremities without swelling/point tenderness.  Full gross range of motion.  NEURO: CN 2-12 grossly intact.  Symmetric facies. 1/4 x bicep knee equal reflexes.  UE/LE   3-4/5 strength throughout.  Nonfocal use extremities. Speech clear.  Reduced light touch with monofilament, vibratory sensation with tuning fork; equal toes/distal feet.    PSYCH: Oriented x 3.  Pleasant calm, well kept.  Purposeful/directed conservation with intact short/long gross memory    Assessment & Plan     1. Primary hypertension    2. Other cardiac arrhythmia    3. Mixed hyperlipidemia    4. Anticoagulated: CVA/ASA  81    5. Gastroesophageal reflux disease, unspecified whether esophagitis present    6. Erectile dysfunction, unspecified erectile dysfunction type    7. Hypercalcemia-watching    8. Neuropathy-offered Rx    9. Multiple chronic diseases    10. Controlled type 2 diabetes mellitus with diabetic polyneuropathy, with long-term current use of insulin        Data review above:   Discussions/medical decisions/reviews:  BP ok  Other vitals pulse 103 noted  Recent Vitals         4/6/2023 7/28/2023 2/28/2024       BP: 103/74 126/78 118/68     Pulse: 71 93 103     Temp: -- 97.7 °F (36.5 °C) 97.1 °F (36.2 °C)     Weight: -- 98 kg (216 lb) 98.4 kg (217 lb)     BMI (Calculated): -- 30.1 30.3           DM/A1c 7.10 10.6.23  DM/BS 96 10.6.23  Lipid LDL 76 10.15.22; lipitor 20+welchol  PSA ok 1.19.23  CBC ok 1.19.23  Renal ok 10.6.23  Liver ok 10.6.23  Vit D 41 3.23.23  Thyroid TSH, T4, 1.19.23-none    Screening reviewed/updated prostate exam  Vaccines discussed (see below) [facility]/for those missing  Refill insulins  Change Cialis back to Viagra 100 (no NTG with viagra)    Follow up: Return for fasting labs due; then lab/Dr Gandhi 6+.  Future Appointments   Date Time Provider Department Center   8/29/2024  9:00 AM Davi Gandhi MD [facility]       Data review above:   Rx: reviewed and decisions:   Rx new/changes: none  New Medications Ordered This Visit   Medications    Insulin Glargine (BASAGLAR KWIKPEN) 100 UNIT/ML injection pen     Sig: Inject 50 Units under the skin into the appropriate area as directed Daily.     Dispense:  45 mL     Refill:  3    insulin aspart (NovoLOG FlexPen) 100 UNIT/ML solution pen-injector sc pen     Sig: Inject 7 Units under the skin into the appropriate area as directed 3 (Three) Times a Day With Meals.     Dispense:  20 mL     Refill:  3     Please provide a 90 day supply    sildenafil (Viagra) 100 MG tablet     Sig: Take 1 tablet by mouth Daily As Needed for Erectile Dysfunction.      "Dispense:  10 tablet     Refill:  2     Orders placed:   LAB/Testing/Referrals: reviewed/orders:   Today:   No orders of the defined types were placed in this encounter.    Chronic/recurrent labs above or change to:   Same     Immunization History   Administered Date(s) Administered    COVID-19 (MODERNA) 1st,2nd,3rd Dose Monovalent 08/02/2021, 08/30/2021    Flu Vaccine Intradermal Quad 18-64YR 12/11/2019    Flucelvax Quad Vial =>4yrs 12/11/2019     We advised/reaffirmed our support/suggestion for staying complete with covid- covid boosters, seasonal flu/yearly and any missing vaccine from list we supplied; when cannot be given here we suggest contact with local health department office or pharmacy to review missing/needed vaccines and then bring nursing documentation for these vaccines to this office or call this information in. Shingles became \"free\" 1.1.23 for medicare insurance.    Health maintenance:   Body mass index is 30.27 kg/m².         Tobacco use reviewed:   Abdullahi MACE  reports that he has never smoked. He has never used smokeless tobacco..There are no Patient Instructions on file for this visit.          "

## 2024-03-04 DIAGNOSIS — E78.2 MIXED HYPERLIPIDEMIA: Chronic | ICD-10-CM

## 2024-03-04 DIAGNOSIS — I10 ESSENTIAL HYPERTENSION: ICD-10-CM

## 2024-03-04 DIAGNOSIS — E11.42 CONTROLLED TYPE 2 DIABETES MELLITUS WITH DIABETIC POLYNEUROPATHY, WITHOUT LONG-TERM CURRENT USE OF INSULIN: Chronic | ICD-10-CM

## 2024-03-04 DIAGNOSIS — Z12.5 PROSTATE CANCER SCREENING: Primary | ICD-10-CM

## 2024-03-04 DIAGNOSIS — I10 PRIMARY HYPERTENSION: Chronic | ICD-10-CM

## 2024-03-04 DIAGNOSIS — E53.8 VITAMIN B 12 DEFICIENCY: ICD-10-CM

## 2024-03-05 LAB
ALBUMIN SERPL-MCNC: 4.3 G/DL (ref 3.5–5.2)
ALBUMIN/GLOB SERPL: 1.7 G/DL
ALP SERPL-CCNC: 103 U/L (ref 39–117)
ALT SERPL-CCNC: 23 U/L (ref 1–41)
AST SERPL-CCNC: 24 U/L (ref 1–40)
BASOPHILS # BLD AUTO: 0.02 10*3/MM3 (ref 0–0.2)
BASOPHILS NFR BLD AUTO: 0.4 % (ref 0–1.5)
BILIRUB SERPL-MCNC: 0.3 MG/DL (ref 0–1.2)
BUN SERPL-MCNC: 23 MG/DL (ref 8–23)
BUN/CREAT SERPL: 15.1 (ref 7–25)
CALCIUM SERPL-MCNC: 9 MG/DL (ref 8.6–10.5)
CHLORIDE SERPL-SCNC: 102 MMOL/L (ref 98–107)
CHOLEST SERPL-MCNC: 84 MG/DL (ref 0–200)
CHOLEST/HDLC SERPL: 2.4 {RATIO}
CO2 SERPL-SCNC: 21.7 MMOL/L (ref 22–29)
CREAT SERPL-MCNC: 1.52 MG/DL (ref 0.76–1.27)
EGFRCR SERPLBLD CKD-EPI 2021: 51.8 ML/MIN/1.73
EOSINOPHIL # BLD AUTO: 0.26 10*3/MM3 (ref 0–0.4)
EOSINOPHIL NFR BLD AUTO: 5.1 % (ref 0.3–6.2)
ERYTHROCYTE [DISTWIDTH] IN BLOOD BY AUTOMATED COUNT: 12.3 % (ref 12.3–15.4)
FOLATE SERPL-MCNC: 8.39 NG/ML (ref 4.78–24.2)
GLOBULIN SER CALC-MCNC: 2.5 GM/DL
GLUCOSE SERPL-MCNC: 116 MG/DL (ref 65–99)
HBA1C MFR BLD: 8.5 % (ref 4.8–5.6)
HCT VFR BLD AUTO: 44.3 % (ref 37.5–51)
HDLC SERPL-MCNC: 35 MG/DL (ref 40–60)
HGB BLD-MCNC: 14.9 G/DL (ref 13–17.7)
IMM GRANULOCYTES # BLD AUTO: 0.02 10*3/MM3 (ref 0–0.05)
IMM GRANULOCYTES NFR BLD AUTO: 0.4 % (ref 0–0.5)
LDLC SERPL CALC-MCNC: 36 MG/DL (ref 0–100)
LYMPHOCYTES # BLD AUTO: 1.53 10*3/MM3 (ref 0.7–3.1)
LYMPHOCYTES NFR BLD AUTO: 30.2 % (ref 19.6–45.3)
MCH RBC QN AUTO: 29.3 PG (ref 26.6–33)
MCHC RBC AUTO-ENTMCNC: 33.6 G/DL (ref 31.5–35.7)
MCV RBC AUTO: 87 FL (ref 79–97)
MONOCYTES # BLD AUTO: 0.59 10*3/MM3 (ref 0.1–0.9)
MONOCYTES NFR BLD AUTO: 11.7 % (ref 5–12)
NEUTROPHILS # BLD AUTO: 2.64 10*3/MM3 (ref 1.7–7)
NEUTROPHILS NFR BLD AUTO: 52.2 % (ref 42.7–76)
NRBC BLD AUTO-RTO: 0 /100 WBC (ref 0–0.2)
PLATELET # BLD AUTO: 173 10*3/MM3 (ref 140–450)
POTASSIUM SERPL-SCNC: 4.6 MMOL/L (ref 3.5–5.2)
PROT SERPL-MCNC: 6.8 G/DL (ref 6–8.5)
PSA SERPL-MCNC: 0.6 NG/ML (ref 0–4)
RBC # BLD AUTO: 5.09 10*6/MM3 (ref 4.14–5.8)
SODIUM SERPL-SCNC: 137 MMOL/L (ref 136–145)
TRIGL SERPL-MCNC: 50 MG/DL (ref 0–150)
TSH SERPL DL<=0.005 MIU/L-ACNC: 2.11 UIU/ML (ref 0.27–4.2)
VIT B12 SERPL-MCNC: 354 PG/ML (ref 211–946)
VLDLC SERPL CALC-MCNC: 13 MG/DL (ref 5–40)
WBC # BLD AUTO: 5.06 10*3/MM3 (ref 3.4–10.8)

## 2024-03-06 DIAGNOSIS — E13.649 UNCONTROLLED DIABETES MELLITUS OF OTHER TYPE WITH HYPOGLYCEMIA, UNSPECIFIED HYPOGLYCEMIA COMA STATUS: ICD-10-CM

## 2024-03-06 DIAGNOSIS — G62.9 NEUROPATHY: Chronic | ICD-10-CM

## 2024-03-06 RX ORDER — FLURBIPROFEN SODIUM 0.3 MG/ML
1 SOLUTION/ DROPS OPHTHALMIC 4 TIMES DAILY
Qty: 400 EACH | Refills: 3 | Status: SHIPPED | OUTPATIENT
Start: 2024-03-06

## 2024-03-06 RX ORDER — DULAGLUTIDE 4.5 MG/.5ML
4.5 INJECTION, SOLUTION SUBCUTANEOUS WEEKLY
COMMUNITY

## 2024-03-06 NOTE — PROGRESS NOTES
Patient is aware of results. Patient states he takes trulicity 4.5mg weekly not ozempic. Patient also asked for me to send in pen needles

## 2024-06-04 DIAGNOSIS — E11.9 CONTROLLED TYPE 2 DIABETES MELLITUS WITHOUT COMPLICATION, WITHOUT LONG-TERM CURRENT USE OF INSULIN: Chronic | ICD-10-CM

## 2024-06-04 RX ORDER — COLESEVELAM 180 1/1
TABLET ORAL
Qty: 540 TABLET | Refills: 1 | Status: SHIPPED | OUTPATIENT
Start: 2024-06-04

## 2024-06-04 NOTE — TELEPHONE ENCOUNTER
Rx Refill Note  Requested Prescriptions     Pending Prescriptions Disp Refills    colesevelam (WELCHOL) 625 MG tablet [Pharmacy Med Name: COLESEVELAM 625MG TABLETS] 540 tablet 1     Sig: TAKE 3 TABLETS BY MOUTH TWICE DAILY WITH MEALS      Last office visit with office: 02/28/2024  Next office visit with office: 08/29/2024    UDS:     DATE OF LAST REFILL: 01/23/2024    Controlled Substance Agreement:     ABIMAEL OR BRENNA:          {TIP  Is Refill Pharmacy correct?:  Rebecca Townsend MA  06/04/24, 11:05 CDT

## 2024-06-07 DIAGNOSIS — E11.65 UNCONTROLLED TYPE 2 DIABETES MELLITUS WITH HYPERGLYCEMIA: ICD-10-CM

## 2024-06-07 RX ORDER — EMPAGLIFLOZIN 25 MG/1
TABLET, FILM COATED ORAL
Qty: 90 TABLET | Refills: 1 | Status: SHIPPED | OUTPATIENT
Start: 2024-06-07

## 2024-08-02 ENCOUNTER — TELEPHONE (OUTPATIENT)
Dept: GASTROENTEROLOGY | Facility: CLINIC | Age: 64
End: 2024-08-02

## 2024-08-02 NOTE — TELEPHONE ENCOUNTER
Caller: Abdullahi MACE    Relationship: Self    Best call back number: [phone]    Requested Prescriptions:   PANTOPRAZOLE    Pharmacy where request should be sent:  [facility] [place]    Last office visit with prescribing clinician: Visit date not found   Last telemedicine visit with prescribing clinician: Visit date not found   Next office visit with prescribing clinician: Visit date not found     Additional details provided by patient: PT STATED THAT HIS PHARMACY HAS SENT OVER A REQUEST FOR THE MEDICATION LAST FRIDAY AND HAVE NOT HEARD BACK.     PT STATED HE'S COMPLETELY OUT AS OF YESTERDAY. I CHECKED PT'S CHART AND DON'T SEE THIS MEDICATION. IT'S OK TO LVM.     Does the patient have less than a 3 day supply:  [x] Yes  [] No    Would you like a call back once the refill request has been completed: [x] Yes [] No    If the office needs to give you a call back, can they leave a voicemail: [x] Yes [] No    Deidre Talbert Rep   08/02/24 10:47 CDT

## 2024-08-14 DIAGNOSIS — E78.2 MIXED HYPERLIPIDEMIA: Chronic | ICD-10-CM

## 2024-08-14 RX ORDER — ATORVASTATIN CALCIUM 20 MG/1
20 TABLET, FILM COATED ORAL DAILY
Qty: 90 TABLET | Refills: 1 | Status: SHIPPED | OUTPATIENT
Start: 2024-08-14

## 2024-08-16 ENCOUNTER — TELEPHONE (OUTPATIENT)
Dept: FAMILY MEDICINE CLINIC | Facility: CLINIC | Age: 64
End: 2024-08-16

## 2024-08-22 DIAGNOSIS — I10 PRIMARY HYPERTENSION: ICD-10-CM

## 2024-08-22 DIAGNOSIS — E11.65 UNCONTROLLED TYPE 2 DIABETES MELLITUS WITH HYPERGLYCEMIA: ICD-10-CM

## 2024-08-22 DIAGNOSIS — E78.2 MIXED HYPERLIPIDEMIA: Primary | ICD-10-CM

## 2024-08-22 DIAGNOSIS — E53.8 VITAMIN B 12 DEFICIENCY: ICD-10-CM

## 2024-08-23 LAB
ALBUMIN SERPL-MCNC: 4.1 G/DL (ref 3.5–5.2)
ALBUMIN/CREAT UR: 12 MG/G CREAT (ref 0–29)
ALBUMIN/GLOB SERPL: 1.5 G/DL
ALP SERPL-CCNC: 94 U/L (ref 39–117)
ALT SERPL-CCNC: 18 U/L (ref 1–41)
AST SERPL-CCNC: 18 U/L (ref 1–40)
BASOPHILS # BLD AUTO: 0.02 10*3/MM3 (ref 0–0.2)
BASOPHILS NFR BLD AUTO: 0.4 % (ref 0–1.5)
BILIRUB SERPL-MCNC: 0.4 MG/DL (ref 0–1.2)
BUN SERPL-MCNC: 19 MG/DL (ref 8–23)
BUN/CREAT SERPL: 14.7 (ref 7–25)
CALCIUM SERPL-MCNC: 9.7 MG/DL (ref 8.6–10.5)
CHLORIDE SERPL-SCNC: 98 MMOL/L (ref 98–107)
CHOLEST SERPL-MCNC: 105 MG/DL (ref 0–200)
CO2 SERPL-SCNC: 24.9 MMOL/L (ref 22–29)
CREAT SERPL-MCNC: 1.29 MG/DL (ref 0.76–1.27)
CREAT UR-MCNC: 86.2 MG/DL
EGFRCR SERPLBLD CKD-EPI 2021: 62.7 ML/MIN/1.73
EOSINOPHIL # BLD AUTO: 0.23 10*3/MM3 (ref 0–0.4)
EOSINOPHIL NFR BLD AUTO: 5.1 % (ref 0.3–6.2)
ERYTHROCYTE [DISTWIDTH] IN BLOOD BY AUTOMATED COUNT: 12.8 % (ref 12.3–15.4)
FOLATE SERPL-MCNC: >20 NG/ML (ref 4.78–24.2)
GLOBULIN SER CALC-MCNC: 2.8 GM/DL
GLUCOSE SERPL-MCNC: 99 MG/DL (ref 65–99)
HBA1C MFR BLD: 9.5 % (ref 4.8–5.6)
HCT VFR BLD AUTO: 45.8 % (ref 37.5–51)
HDLC SERPL-MCNC: 35 MG/DL (ref 40–60)
HGB BLD-MCNC: 15 G/DL (ref 13–17.7)
IMM GRANULOCYTES # BLD AUTO: 0.02 10*3/MM3 (ref 0–0.05)
IMM GRANULOCYTES NFR BLD AUTO: 0.4 % (ref 0–0.5)
LDLC SERPL CALC-MCNC: 53 MG/DL (ref 0–100)
LYMPHOCYTES # BLD AUTO: 1.54 10*3/MM3 (ref 0.7–3.1)
LYMPHOCYTES NFR BLD AUTO: 33.9 % (ref 19.6–45.3)
MCH RBC QN AUTO: 29.8 PG (ref 26.6–33)
MCHC RBC AUTO-ENTMCNC: 32.8 G/DL (ref 31.5–35.7)
MCV RBC AUTO: 90.9 FL (ref 79–97)
MICROALBUMIN UR-MCNC: 10.5 UG/ML
MONOCYTES # BLD AUTO: 0.66 10*3/MM3 (ref 0.1–0.9)
MONOCYTES NFR BLD AUTO: 14.5 % (ref 5–12)
NEUTROPHILS # BLD AUTO: 2.07 10*3/MM3 (ref 1.7–7)
NEUTROPHILS NFR BLD AUTO: 45.7 % (ref 42.7–76)
NRBC BLD AUTO-RTO: 0 /100 WBC (ref 0–0.2)
PLATELET # BLD AUTO: 155 10*3/MM3 (ref 140–450)
POTASSIUM SERPL-SCNC: 4.8 MMOL/L (ref 3.5–5.2)
PROT SERPL-MCNC: 6.9 G/DL (ref 6–8.5)
RBC # BLD AUTO: 5.04 10*6/MM3 (ref 4.14–5.8)
SODIUM SERPL-SCNC: 139 MMOL/L (ref 136–145)
TRIGL SERPL-MCNC: 85 MG/DL (ref 0–150)
TSH SERPL DL<=0.005 MIU/L-ACNC: 3.81 UIU/ML (ref 0.27–4.2)
VIT B12 SERPL-MCNC: 424 PG/ML (ref 211–946)
VLDLC SERPL CALC-MCNC: 17 MG/DL (ref 5–40)
WBC # BLD AUTO: 4.54 10*3/MM3 (ref 3.4–10.8)

## 2024-08-26 ENCOUNTER — OFFICE VISIT (OUTPATIENT)
Dept: FAMILY MEDICINE CLINIC | Facility: CLINIC | Age: 64
End: 2024-08-26
Payer: COMMERCIAL

## 2024-08-26 VITALS
DIASTOLIC BLOOD PRESSURE: 68 MMHG | HEART RATE: 64 BPM | BODY MASS INDEX: 29.71 KG/M2 | OXYGEN SATURATION: 98 % | HEIGHT: 71 IN | WEIGHT: 212.2 LBS | SYSTOLIC BLOOD PRESSURE: 122 MMHG

## 2024-08-26 DIAGNOSIS — Z79.4 TYPE 2 DIABETES MELLITUS WITH DIABETIC POLYNEUROPATHY, WITH LONG-TERM CURRENT USE OF INSULIN: ICD-10-CM

## 2024-08-26 DIAGNOSIS — N28.9 RENAL INSUFFICIENCY: ICD-10-CM

## 2024-08-26 DIAGNOSIS — I10 PRIMARY HYPERTENSION: Chronic | ICD-10-CM

## 2024-08-26 DIAGNOSIS — N52.9 ERECTILE DYSFUNCTION, UNSPECIFIED ERECTILE DYSFUNCTION TYPE: Chronic | ICD-10-CM

## 2024-08-26 DIAGNOSIS — E11.42 TYPE 2 DIABETES MELLITUS WITH DIABETIC POLYNEUROPATHY, WITH LONG-TERM CURRENT USE OF INSULIN: ICD-10-CM

## 2024-08-26 DIAGNOSIS — E53.8 VITAMIN B 12 DEFICIENCY: ICD-10-CM

## 2024-08-26 DIAGNOSIS — R69 MULTIPLE CHRONIC DISEASES: ICD-10-CM

## 2024-08-26 DIAGNOSIS — E78.2 MIXED HYPERLIPIDEMIA: Chronic | ICD-10-CM

## 2024-08-26 DIAGNOSIS — I49.8 OTHER CARDIAC ARRHYTHMIA: ICD-10-CM

## 2024-08-26 DIAGNOSIS — K21.9 GASTROESOPHAGEAL REFLUX DISEASE, UNSPECIFIED WHETHER ESOPHAGITIS PRESENT: ICD-10-CM

## 2024-08-26 PROCEDURE — 99214 OFFICE O/P EST MOD 30 MIN: CPT | Performed by: FAMILY MEDICINE

## 2024-08-26 RX ORDER — ACYCLOVIR 400 MG/1
1 TABLET ORAL DAILY
Qty: 3 EACH | Refills: 3 | Status: SHIPPED | OUTPATIENT
Start: 2024-08-26

## 2024-08-26 RX ORDER — ACYCLOVIR 400 MG/1
1 TABLET ORAL DAILY
Qty: 1 EACH | Refills: 3 | Status: SHIPPED | OUTPATIENT
Start: 2024-08-26

## 2024-08-26 NOTE — PROGRESS NOTES
ROSITA”.   Subjective   Abdullahi MACE is a 64 y.o. male presenting with chief complaint of:   Chief Complaint   Patient presents with    Hypertension    Med Refill     AWV NA  Last Completed Annual Wellness Visit       This patient has no relevant Health Maintenance data.             History of Present Illness :  Alone.   Here for review of chronic problems that includes DM2 and others.     Has multiple chronic problems to consider that might have a bearing on today's issues;  an interval appointment.       Chronic/acute problems reviewed today:   1. Primary hypertension Chronic/stable. Stable here past/no recent home blood pressures.  No significant chest pain, SOB, LE edema, orthopnea, near syncope, dizziness/light headness.   Recent Vitals         7/28/2023 2/28/2024 8/26/2024       BP: 126/78 118/68 122/68     Pulse: 93 103 64     Temp: 97.7 °F (36.5 °C) 97.1 °F (36.2 °C) --     Weight: 98 kg (216 lb) 98.4 kg (217 lb) 96.3 kg (212 lb 3.2 oz)     BMI (Calculated): 30.1 30.3 29.6              2. Other cardiac arrhythmia Chronic/stable.   History of benign PVCs.  Rhythm currently seems controlled.  Medications seem tolerated.  No syncope near syncope.     3. Mixed hyperlipidemia Chronic/stable.  Tolerated use of Rx with labs showing improved lipid values and tolerant liver labs. No muscle aches unexpected.      4. Renal insufficiency Chronic/stable.  No yet nephrology.  No dysuria.  Previously warned/reminded:   To avoid further kidney function decline  A. Treat any time you think you have infection  B. Stay hydrated (dont get dehydrated); drink at least 60 oz fluid every 24 hr (1800 cc or nearly a 2L)  C. Do not allow any xrays with dye WITHOUT the doctor ordering checking your renal function  D. Do not get new medications without the doctor considering your renal condition  E. Do not use motrin/ibuprofen, alleve/naprosyn and these types of medications     5. Type 2 diabetes mellitus with diabetic  polyneuropathy, with long-term current use of insulin    6. Multiple chronic diseases Has multiple chronic problems with increased risks for management (involves polypharmacy, multiple providers, many required labs/imaging/ to manage)     7. Gastroesophageal reflux disease, unspecified whether esophagitis present Chronic/stable.  Controlled heartburn, reflux without dysphagia, melena.  Rx used, periods not used proven currently needed with symptoms -currently doing ok.      8. Vitamin B 12 deficiency chronic previous low B12 with inconsistent replacement.  Note history of neuropathy     Has an/another acute issue today: none.    The following portions of the patient's history were reviewed and updated as appropriate: allergies, current medications, past family history, past medical history, past social history, past surgical history, and problem list.      Current Outpatient Medications:     aspirin 81 MG EC tablet, Take 1 tablet by mouth Daily., Disp: , Rfl:     atorvastatin (LIPITOR) 20 MG tablet, TAKE 1 TABLET BY MOUTH DAILY, Disp: 90 tablet, Rfl: 1    colesevelam (WELCHOL) 625 MG tablet, TAKE 3 TABLETS BY MOUTH TWICE DAILY WITH MEALS, Disp: 540 tablet, Rfl: 1    insulin aspart (NovoLOG FlexPen) 100 UNIT/ML solution pen-injector sc pen, Inject 7 Units under the skin into the appropriate area as directed 3 (Three) Times a Day With Meals., Disp: 20 mL, Rfl: 3    Insulin Pen Needle (B-D UF III MINI PEN NEEDLES) 31G X 5 MM misc, Use 1 Device 4 (Four) Times a Day., Disp: 400 each, Rfl: 3    Jardiance 25 MG tablet tablet, TAKE 1 TABLET DAILY, Disp: 90 tablet, Rfl: 1    lisinopril (PRINIVIL,ZESTRIL) 10 MG tablet, TAKE 1 TABLET BY MOUTH DAILY, Disp: 90 tablet, Rfl: 3    metFORMIN (GLUCOPHAGE) 500 MG tablet, TAKE 2 TABLETS BY MOUTH TWICE DAILY WITH MEALS, Disp: 360 tablet, Rfl: 3    sildenafil (Viagra) 100 MG tablet, Take 1 tablet by mouth Daily As Needed for Erectile Dysfunction., Disp: 10 tablet, Rfl: 2    vitamin D  (ERGOCALCIFEROL) 1.25 MG (74126 UT) capsule capsule, TAKE 1 CAPSULE BY MOUTH 1 TIME EVERY WEEK, Disp: 5 capsule, Rfl: 0    Insulin Glargine (LANTUS SOLOSTAR) 100 UNIT/ML injection pen, Inject 15 Units under the skin into the appropriate area as directed Daily for 90 days., Disp: 14 mL, Rfl: 2  Trulicity 4.5 mg wweekly     No problems with medications.    No Known Allergies    Review of Systems  GENERAL:  Active/slower with limits, speed, stamina for age.  Sleep is ok. No fever now.  ENDO:  No syncope, near or diaphoretic sweaty spells.  BS variable verbal report.   HEENT: No head injury or headache.   No vision change.  No significant hearing loss.  Ears without pain/drainage.  No sore throat.  No usual significant nasal/sinus congestion/drainage; worse couple weeks.  No epistaxis.  CHEST: No chest wall tenderness or mass. No usual significant cough, without wheeze.  No SOB; no hemoptysis.  CV: No chest pain, palpitations, ankle edema.  GI: No heartburn, dysphagia.  No abdominal pain, diarrhea, constipation.  No rectal bleeding, or melena.    :  Voids without dysuria, or incontinence to completion.  Weak erections without Rx.   ORTHO: No painful/swollen joints but various on /off sore.  No sore neck or back.  No acute neck or back pain without recent injury.   NEURO: No dizziness, weakness of extremities.    Same LE numbness/paresthesias.     PSYCH: No memory loss.  Mood good; not anxious, depressed   Screening:  Mammogram: NA  Bone density: NA  Low dose CT chest: Tobacco-smoker/never: NA  GI:   Colon-ihem/Shieben/[facility]/9.19.22/5y  EGD-?b-dil/Shieben/[facility]/9.19.22/2m  EGD-b/Shieben/[facility]/4.6.23/3y  Prostate:   2.28.24 voiding ok; exam ok  1.17.23 ok-voiding ok  10.17.22 ro deferred  12.23.20  12.11.19 AUA 3/1  7.13.18  Usual lab order  6m CMP, A1c  12m CBC, CMP, A1c, LIPID, TSH, PSAs, B12, folate    Copy/paste function used for ROS/exam AND each area of these were reviewed, updated, confirmed and supplemented as  needed.  Data reviewed:   Recent admit/ER/MD visits: 2.28.24    1. Primary hypertension    2. Other cardiac arrhythmia    3. Mixed hyperlipidemia    4. Anticoagulated: CVA/ASA 81    5. Gastroesophageal reflux disease, unspecified whether esophagitis present    6. Erectile dysfunction, unspecified erectile dysfunction type    7. Hypercalcemia-watching    8. Neuropathy-offered Rx    9. Multiple chronic diseases    10. Controlled type 2 diabetes mellitus with diabetic polyneuropathy, with long-term current use of insulin          Data review above:   Discussions/medical decisions/reviews:  BP ok  Other vitals pulse 103 noted  Recent Vitals           4/6/2023 7/28/2023 2/28/2024          BP: 103/74 126/78 118/68       Pulse: 71 93 103       Temp: -- 97.7 °F (36.5 °C) 97.1 °F (36.2 °C)       Weight: -- 98 kg (216 lb) 98.4 kg (217 lb)       BMI (Calculated): -- 30.1 30.3               DM/A1c 7.10 10.6.23  DM/BS 96 10.6.23  Lipid LDL 76 10.15.22; lipitor 20+welchol  PSA ok 1.19.23  CBC ok 1.19.23  Renal ok 10.6.23  Liver ok 10.6.23  Vit D 41 3.23.23  Thyroid TSH, T4, 1.19.23-none     Screening reviewed/updated prostate exam  Vaccines discussed (see below) [facility]/for those missing  Refill insulins  Change Cialis back to Viagra 100 (no NTG with viagra)     Follow up: Return for fasting labs due; then lab/Dr Gandhi 6+.    Last cardiac testing:   Echo:   Results for orders placed during the hospital encounter of 10/14/22    Adult Transthoracic Echo Complete W/ Cont if Necessary Per Protocol (With Agitated Saline)    Interpretation Summary    Left ventricular ejection fraction appears to be 46 - 50%. Left ventricular systolic function is mildly decreased.    Left ventricular diastolic function was normal.    Saline test results are negative.    Estimated right ventricular systolic pressure from tricuspid regurgitation is normal (<35 mmHg).    Mild dilation of the aortic root is present.    Radiology considered:   No  radiology results for the last 90 days.    Lab Results:  Results for orders placed or performed in visit on 08/22/24   Microalbumin / Creatinine Urine Ratio -   Result Value Ref Range    Creatinine, Urine 86.2 Not Estab. mg/dL    Microalbumin, Urine 10.5 Not Estab. ug/mL    Microalbumin/Creatinine Ratio 12 0 - 29 mg/g creat       A1C:  Lab Results - Last 18 Months   Lab Units 08/22/24  0925 03/04/24  0859 10/06/23  0751 07/20/23  0907   HEMOGLOBIN A1C % 9.50* 8.50* 7.10* 11.80*     GLUCOSE:  Lab Results - Last 18 Months   Lab Units 08/22/24  0925 03/04/24  0859 10/06/23  0751   GLUCOSE mg/dL 99 116* 96     LIPID:  Lab Results - Last 18 Months   Lab Units 08/22/24  0925 03/04/24  0859   CHOLESTEROL mg/dL 105 84   LDL CHOL mg/dL 53 36   HDL CHOL mg/dL 35* 35*   TRIGLYCERIDES mg/dL 85 50     PSA:  Lab Results   Component Value Date/Time    PSA 0.602 03/04/2024 08:59 AM    PSA 0.615 01/19/2023 07:11 AM    PSA 0.677 12/21/2020 07:04 AM    PSA 0.488 12/04/2019 07:19 AM    PSA 0.518 07/11/2018 08:32 AM    PSA 0.404 12/02/2016 08:21 AM       CBC:  Lab Results - Last 18 Months   Lab Units 08/22/24  0925 03/04/24  0859   WBC 10*3/mm3 4.54 5.06   HEMOGLOBIN g/dL 15.0 14.9   HEMATOCRIT % 45.8 44.3   PLATELETS 10*3/mm3 155 173   VITAMIN B 12 pg/mL 424 354   FOLATE ng/mL >20.00 8.39     BMP/CMP:  Lab Results - Last 18 Months   Lab Units 08/22/24  0925 03/04/24  0859 10/06/23  0751   SODIUM mmol/L 139 137 137   POTASSIUM mmol/L 4.8 4.6 5.1   CHLORIDE mmol/L 98 102 103   CO2 mmol/L 24.9 21.7* 22.9   GLUCOSE mg/dL 99 116* 96   BUN mg/dL 19 23 22   CREATININE mg/dL 1.29* 1.52* 1.25   EGFR RESULT mL/min/1.73 62.7 51.8* 65.5   CALCIUM mg/dL 9.7 9.0 10.0     HEPATIC:  Lab Results - Last 18 Months   Lab Units 08/22/24  0925 03/04/24  0859 10/06/23  0751   ALT (SGPT) U/L 18 23 18   AST (SGOT) U/L 18 24 21   ALK PHOS U/L 94 103 106     HEPATITIS C ANTIBODY:   Lab Results   Component Value Date/Time    HEPCVIRUSABY 0.2 07/11/2018 08:32 AM  "    Vit D:  Lab Results - Last 18 Months   Lab Units 03/23/23  0812   VIT D 25 HYDROXY ng/ml 41.1     THYROID:  Lab Results - Last 18 Months   Lab Units 08/22/24  0925 03/04/24  0859   TSH uIU/mL 3.810 2.110       Objective   /68   Pulse 64   Ht 180.3 cm (71\")   Wt 96.3 kg (212 lb 3.2 oz)   SpO2 98%   BMI 29.60 kg/m²   Body mass index is 29.6 kg/m².    Recent Vitals         7/28/2023 2/28/2024 8/26/2024       BP: 126/78 118/68 122/68     Pulse: 93 103 64     Temp: 97.7 °F (36.5 °C) 97.1 °F (36.2 °C) --     Weight: 98 kg (216 lb) 98.4 kg (217 lb) 96.3 kg (212 lb 3.2 oz)     BMI (Calculated): 30.1 30.3 29.6             Physical Exam    GENERAL:  Well nourished/developed in no acute distress. Obese   SKIN: Turgor excellent, without wound, rash, lesion    HEENT: Normal cephalic without trauma.  Pupils equal round reactive to light. Extraocular motions full without nystagmus.   External canals nonobstructive nontender without reddness. Tymphatic membranes vale with ottoniel structures intact.   Oral cavity without growths, exudates, and moist.  Posterior pharynx without mass, obstruction, redness.  No thyromegaly, mass, tenderness, lymphadenopathy and supple.  CV: Regular rhythm.  No murmur, gallop,  edema. Posterior pulses intact/weakly.  No carotid bruits.  CHEST: No chest wall tenderness or mass.   LUNGS: Symmetric motion with clear to auscultation.   ABD: Soft, nontender without mass.    ORTHO: Symmetric extremities without swelling/point tenderness.  Full gross range of motion.  NEURO: CN 2-12 grossly intact.  Symmetric facies. 1/4 x bicep knee equal reflexes.  UE/LE   3-4/5 strength throughout.  Nonfocal use extremities. Speech clear.  Reduced light touch with monofilament, vibratory sensation with tuning fork; equal toes/distal feet.    PSYCH: Oriented x 3.  Pleasant calm, well kept.  Purposeful/directed conservation with intact short/long gross memory    Assessment & Plan     1. Primary hypertension  "   2. Other cardiac arrhythmia    3. Mixed hyperlipidemia    4. Renal insufficiency    5. Type 2 diabetes mellitus with diabetic polyneuropathy, with long-term current use of insulin    6. Multiple chronic diseases    7. Gastroesophageal reflux disease, unspecified whether esophagitis present    8. Vitamin B 12 deficiency        Data review above:   Discussions/medical decisions/reviews:  Recent Vitals         7/28/2023 2/28/2024 8/26/2024       BP: 126/78 118/68 122/68     Pulse: 93 103 64     Temp: 97.7 °F (36.5 °C) 97.1 °F (36.2 °C) --     Weight: 98 kg (216 lb) 98.4 kg (217 lb) 96.3 kg (212 lb 3.2 oz)     BMI (Calculated): 30.1 30.3 29.6             Wt Readings from Last 15 Encounters:   08/26/24 0904 96.3 kg (212 lb 3.2 oz)   02/28/24 1326 98.4 kg (217 lb)   07/28/23 1359 98 kg (216 lb)   04/06/23 0801 96.2 kg (212 lb)   01/17/23 1136 96.7 kg (213 lb 3.2 oz)   12/19/22 1504 96.4 kg (212 lb 9.6 oz)   10/17/22 1504 97.2 kg (214 lb 3.2 oz)   10/15/22 1055 98 kg (216 lb)   10/14/22 2034 95.5 kg (210 lb 8 oz)   10/14/22 1709 98 kg (216 lb)   10/14/22 1828 97.5 kg (215 lb)   09/19/22 1117 97.5 kg (215 lb)   09/08/22 0833 96.6 kg (213 lb)   09/20/21 1038 91.6 kg (202 lb)   06/23/21 0930 90.7 kg (200 lb)   12/23/20 0902 91.8 kg (202 lb 6.4 oz)   12/11/19 0905 88 kg (194 lb)       BP ok  Other vitals ok  Weight stable up/down  DM/A1c 9.5 8.22.24 up from 8.5  DM/BS 99 8.22.24  Lipid LDL 53 8.22.24; lipitor 20 + welchol  PSA ok 3.4.24  CBC ok 8.22.24  B12 424 8.22.24  Folate > 20 8.22.24  Renal ok 8.22.24  Liver ok 8.22.24  Vit D 41 3.23.23  Thyroid TSH ok 8.22.24     Current Outpatient Medications for DM    Dulaglutide (Trulicity) 4.5 MG/0.5ML solution pen-injector, Inject 0.5 mL under the skin into the appropriate area as directed 1 (One) Time Per Week., Disp: , Rfl:     insulin aspart (NovoLOG FlexPen) 100 UNIT/ML solution pen-injector sc pen, Inject 7 Units under the skin into the appropriate area as directed 3 (Three)  Times a Day With Meals., Disp: 20 mL, Rfl: 3    Jardiance 25 MG tablet tablet, TAKE 1 TABLET DAILY, Disp: 90 tablet, Rfl: 1    metFORMIN (GLUCOPHAGE) 500 MG tablet, TAKE 2 TABLETS BY MOUTH TWICE DAILY WITH MEALS, Disp: 360 tablet, Rfl: 3    Insulin Glargine (LANTUS SOLOSTAR) 100 UNIT/ML injection pen, Inject 15 Units under the skin into the appropriate area as directed Daily for 90 days., Disp: 14 mL, Rfl:   Options Actos, change mournjorna    Screening reviewed/updated up to date  Vaccines discussed (see below)   Change trulicity to Mournjor and see if can get slightly better A1c/control      Discussed upcoming care home of me/Dr Gandhi.  Discussed new physician, Dr Dionicio Gamble coming as replacement. Decision to: stay at /[facility].    Follow up: Return for lab/return 4m .  Future Appointments   Date Time Provider Department Center   12/19/2024 10:15 AM Rian Gamble MD [facility]       Data review above:   Rx: reviewed and decisions:   Rx new/changes: and monitor  New Medications Ordered This Visit   Medications    Continuous Glucose Sensor (Dexcom G7 Sensor) misc     Sig: Use 1 each Daily.     Dispense:  3 each     Refill:  3    Continuous Glucose  (Dexcom G7 ) device     Sig: Use 1 each Daily.     Dispense:  1 each     Refill:  3       Orders placed:   LAB/Testing/Referrals: reviewed/orders:   Today:   No orders of the defined types were placed in this encounter.    Chronic/recurrent labs above or change to:   Same     Immunization History   Administered Date(s) Administered    COVID-19 (MODERNA) 1st,2nd,3rd Dose Monovalent 08/02/2021, 08/30/2021    Flu Vaccine Intradermal Quad 18-64YR 12/11/2019    Flucelvax Quad Vial =>4yrs 12/11/2019     We advised/reaffirmed our support/suggestion for staying complete with covid- covid boosters, seasonal flu/yearly and any missing vaccine from list we supplied; when cannot be given here we suggest contact with local health department office or pharmacy  "to review missing/needed vaccines and then bring nursing documentation for these vaccines to this office or call this information in. Shingles became \"free\" 1.1.23 for medicare insurance.    Health maintenance:   Body mass index is 29.6 kg/m².  BMI is >= 25 and <30. (Overweight) The following options were offered after discussion;: exercise counseling/recommendations and nutrition counseling/recommendations      Tobacco use reviewed:     Abdullahi RODRIGUEZ NAKITA  reports that he has never smoked. He has never used smokeless tobacco.       There are no Patient Instructions on file for this visit.          "

## 2024-08-26 NOTE — TELEPHONE ENCOUNTER
The Mounjaro can not be sent through to mail order pharmacy.  I have it pending to go to [facility] in [place].

## 2024-08-27 NOTE — PATIENT INSTRUCTIONS
Keep us informed on your working with your sugar.    Goal is to get A1c down BUT not influence finishing your treatments or causing excessive low sugars  BUT if increase in Rx today does nothing; let us know.    g/dL    Hematocrit 23.9 (L) 36.0 - 48.0 %    MCV 93.4 80.0 - 100.0 fL    MCH 31.8 26.0 - 34.0 pg    MCHC 34.0 31.0 - 36.0 g/dL    RDW 17.3 (H) 12.4 - 15.4 %    Platelets 114 (L) 135 - 450 K/uL    MPV 7.3 5.0 - 10.5 fL   BMP w/ Reflex to MG   Result Value Ref Range    Sodium 137 136 - 145 mmol/L    Potassium reflex Magnesium 3.6 3.5 - 5.1 mmol/L    Chloride 104 99 - 110 mmol/L    CO2 20 (L) 21 - 32 mmol/L    Anion Gap 13 3 - 16    Glucose 144 (H) 70 - 99 mg/dL    BUN 20 7 - 20 mg/dL    Creatinine 1.3 (H) 0.6 - 1.2 mg/dL    Est, Glom Filt Rate 46 (A) >60    Calcium 8.5 8.3 - 10.6 mg/dL   Lactate, Sepsis   Result Value Ref Range    Lactic Acid, Sepsis 1.6 0.4 - 1.9 mmol/L   Blood Gas, Venous   Result Value Ref Range    pH, Adilson 7.405 7.350 - 7.450    pCO2, Adilson 36.8 (L) 41.0 - 51.0 mmHg    PO2, Adilson 59.4 (H) 25.0 - 40.0 mmHg    HCO3, Venous 23.0 (L) 24.0 - 28.0 mmol/L    Base Excess, Adilson -1.5 -2.0 - 3.0 mmol/L    O2 Sat, Adilson 90 Not established %    Carboxyhemoglobin 1.3 0.0 - 1.5 %    MetHgb, Adilson 0.3 0.0 - 1.5 %    TC02 (Calc), Adilson 24 mmol/L    Hemoglobin, Adilson, Reduced 10.00 %   Troponin   Result Value Ref Range    Troponin, High Sensitivity 49 (H) 0 - 14 ng/L   BNP   Result Value Ref Range    Pro-BNP 1,050 (H) 0 - 124 pg/mL   D-Dimer, Quantitative   Result Value Ref Range    D-Dimer, Quant 1.78 (H) 0.00 - 0.60 ug/mL FEU   EKG 12 Lead   Result Value Ref Range    Ventricular Rate 100 BPM    Atrial Rate 100 BPM    P-R Interval 164 ms    QRS Duration 72 ms    Q-T Interval 412 ms    QTc Calculation (Bazett) 531 ms    P Axis 60 degrees    R Axis 51 degrees    T Axis 73 degrees    Diagnosis       Normal sinus rhythmProlonged QTAbnormal ECGEKG performed in ER and to be interpreted by ER physician.Confirmed by MD, ER ([id number]),  FOUZIA VILLANUEVA ([id number]) on 8/27/2024 5:53:45 AM     EKG   Indication: Shortness of breath.  Heart rate 100, intervals normal, axis normal.  No findings of ST elevation or depression, no T wave inversions.  uncle; High Blood Pressure in her brother, maternal aunt, maternal uncle, maternal uncle, and mother; Hypertension in her brother, father, and sister; No Known Problems in her maternal aunt, maternal grandfather, maternal grandmother, and paternal grandmother; Other in her maternal aunt and sister; Parkinsonism in her maternal uncle; Rheum Arthritis in her brother, mother, sister, and sister; Sleep Apnea in her brother, mother, and sister; Stroke in her brother.  She reports that she quit smoking about 39 years ago. Her smoking use included cigarettes. She started smoking about 49 years ago. She has a 5 pack-year smoking history. She has been exposed to tobacco smoke. She has never used smokeless tobacco. She reports that she does not currently use alcohol after a past usage of about 1.0 standard drink of alcohol per week. She reports that she does not use drugs.    Medications     Previous Medications    ASPIRIN (ASPIRIN LOW DOSE) 81 MG CHEWABLE TABLET    CHEW 1 TABLET BY MOUTH DAILY    CEPHALEXIN (KEFLEX) 500 MG CAPSULE    Take 1 capsule by mouth 3 times daily for 7 days    CITALOPRAM (CELEXA) 40 MG TABLET    Take 1 tablet by mouth daily    NIFEDIPINE (ADALAT CC) 30 MG EXTENDED RELEASE TABLET    Take 1 tablet by mouth daily    OMEPRAZOLE (PRILOSEC OTC) 20 MG TABLET    Take by mouth    ROSUVASTATIN (CRESTOR) 40 MG TABLET    Take 1 tablet by mouth nightly    VALSARTAN (DIOVAN) 320 MG TABLET    Take 1 tablet by mouth daily       Allergies     She is allergic to gabapentin, pantoprazole, atorvastatin, meloxicam, and protonix [pantoprazole sodium].    Physical Exam     INITIAL VITALS: BP: (!) 173/84, Temp: 99.4 °F (37.4 °C), Pulse: (!) 112, Respirations: 21, SpO2: 93 %   Physical Exam  Constitutional:       General: She is not in acute distress.     Appearance: She is well-developed. She is not diaphoretic.   HENT:      Head: Normocephalic and atraumatic.      Mouth/Throat:      Pharynx: No oropharyngeal exudate.

## 2024-10-31 NOTE — TELEPHONE ENCOUNTER
Caller: Arlet Gomez    Relationship: Emergency Contact    Best call back number: [phone]     Requested Prescriptions:   Requested Prescriptions     Pending Prescriptions Disp Refills    Insulin Glargine (LANTUS SOLOSTAR) 100 UNIT/ML injection pen 14 mL 2     Sig: Inject 15 Units under the skin into the appropriate area as directed Daily for 90 days.        Pharmacy where request should be sent: [facility] - [facility] - [address] AT [address] TO [facility] - [phone] [facility] [phone]      Last office visit with prescribing clinician: Visit date not found   Last telemedicine visit with prescribing clinician: Visit date not found   Next office visit with prescribing clinician: 12/19/2024     Additional details provided by patient: PATIENT HAS 1 AND HALF PENS    Does the patient have less than a 3 day supply:  [x] Yes  [] No    Would you like a call back once the refill request has been completed: [x] Yes [] No    If the office needs to give you a call back, can they leave a voicemail: [x] Yes [] No    Deidre Casillas Rep   10/31/24 09:46 CDT

## 2024-12-19 ENCOUNTER — OFFICE VISIT (OUTPATIENT)
Dept: FAMILY MEDICINE CLINIC | Facility: CLINIC | Age: 64
End: 2024-12-19
Payer: COMMERCIAL

## 2024-12-19 VITALS
DIASTOLIC BLOOD PRESSURE: 60 MMHG | SYSTOLIC BLOOD PRESSURE: 110 MMHG | BODY MASS INDEX: 29.68 KG/M2 | HEIGHT: 71 IN | WEIGHT: 212 LBS | HEART RATE: 52 BPM | OXYGEN SATURATION: 98 %

## 2024-12-19 DIAGNOSIS — E11.9 TYPE 2 DIABETES MELLITUS WITHOUT COMPLICATION, WITH LONG-TERM CURRENT USE OF INSULIN: Primary | ICD-10-CM

## 2024-12-19 DIAGNOSIS — Z79.4 TYPE 2 DIABETES MELLITUS WITHOUT COMPLICATION, WITH LONG-TERM CURRENT USE OF INSULIN: Primary | ICD-10-CM

## 2024-12-19 DIAGNOSIS — I10 PRIMARY HYPERTENSION: Chronic | ICD-10-CM

## 2024-12-19 DIAGNOSIS — E66.3 OVERWEIGHT (BMI 25.0-29.9): Primary | ICD-10-CM

## 2024-12-19 DIAGNOSIS — E78.2 MIXED HYPERLIPIDEMIA: Chronic | ICD-10-CM

## 2024-12-19 LAB
EXPIRATION DATE: NORMAL
HBA1C MFR BLD: 5.4 % (ref 4.5–5.7)
Lab: NORMAL

## 2024-12-19 PROCEDURE — 99214 OFFICE O/P EST MOD 30 MIN: CPT

## 2024-12-19 PROCEDURE — 83036 HEMOGLOBIN GLYCOSYLATED A1C: CPT

## 2024-12-19 RX ORDER — ACYCLOVIR 400 MG/1
1 TABLET ORAL DAILY
Qty: 3 EACH | Refills: 3 | Status: SHIPPED | OUTPATIENT
Start: 2024-12-19

## 2024-12-19 RX ORDER — TIRZEPATIDE 7.5 MG/.5ML
7.5 INJECTION, SOLUTION SUBCUTANEOUS WEEKLY
Qty: 2 ML | Refills: 5 | Status: SHIPPED | OUTPATIENT
Start: 2024-12-19

## 2024-12-19 NOTE — PROGRESS NOTES
"Chief Complaint  Diabetes, Hypertension, and Hyperlipidemia    Subjective      Abdullahi MACE presents to [facility]  History of Present Illness  The patient is a 64-year-old male who presents today for a routine follow-up.    He has been managing his diabetes with the aid of a Dexcom G7 monitoring system, which he reports as being significantly beneficial. His current medication regimen includes Lantus 15 units in the evening, NovoLog 7 units 3 times daily with meals, Mounjaro 5 mg once weekly, metformin 500 mg twice daily, and Jardiance 25 mg. He reports tolerating these medications well. He was previously on Trulicity, which he also tolerated well. He has made dietary modifications and has been monitoring his carbohydrate intake. He reports no history of pancreatitis. He maintains an active lifestyle, including caring for his 5 grandchildren.    MEDICATIONS  Lantus, NovoLog, Monjaro, metformin, Jardiance, lisinopril, atorvastatin      Objective   Vital Signs:  /60   Pulse 52   Ht 180.3 cm (71\")   Wt 96.2 kg (212 lb)   SpO2 98%   BMI 29.57 kg/m²   Estimated body mass index is 29.57 kg/m² as calculated from the following:    Height as of this encounter: 180.3 cm (71\").    Weight as of this encounter: 96.2 kg (212 lb).            Physical Exam     Physical Exam        Result Review :  The following labs/imaging/notes were reviewed and discussed with the patient by Rian Gamble MD on 12/19/2024:   Latest Reference Range & Units 03/04/24 08:59 08/22/24 09:25   Hemoglobin A1C 4.80 - 5.60 % 8.50 (H) 9.50 (H)   (H): Data is abnormally high   Latest Reference Range & Units 08/22/24 09:25 08/22/24 09:49   Sodium 136 - 145 mmol/L 139    Potassium 3.5 - 5.2 mmol/L 4.8    Chloride 98 - 107 mmol/L 98    CO2 22.0 - 29.0 mmol/L 24.9    BUN 8 - 23 mg/dL 19    Creatinine 0.76 - 1.27 mg/dL 1.29 (H)    BUN/Creatinine Ratio 7.0 - 25.0  14.7    EGFR Result >60.0 mL/min/1.73 62.7    Glucose " 65 - 99 mg/dL 99    Calcium 8.6 - 10.5 mg/dL 9.7    Alkaline Phosphatase 39 - 117 U/L 94    Total Protein 6.0 - 8.5 g/dL 6.9    Albumin 3.5 - 5.2 g/dL 4.1    A/G Ratio g/dL 1.5    AST (SGOT) 1 - 40 U/L 18    ALT (SGPT) 1 - 41 U/L 18    Total Bilirubin 0.0 - 1.2 mg/dL 0.4    Hemoglobin A1C 4.80 - 5.60 % 9.50 (H)    TSH Baseline 0.270 - 4.200 uIU/mL 3.810    Vitamin B-12 211 - 946 pg/mL 424    Folate 4.78 - 24.20 ng/mL >20.00    Total Cholesterol 0 - 200 mg/dL 105    HDL Cholesterol 40 - 60 mg/dL 35 (L)    LDL Cholesterol  0 - 100 mg/dL 53    Triglycerides 0 - 150 mg/dL 85    VLDL Cholesterol Solo 5 - 40 mg/dL 17    Globulin gm/dL 2.8    WBC 3.40 - 10.80 10*3/mm3 4.54    RBC 4.14 - 5.80 10*6/mm3 5.04    Hemoglobin 13.0 - 17.7 g/dL 15.0    Hematocrit 37.5 - 51.0 % 45.8    Platelets 140 - 450 10*3/mm3 155    RDW 12.3 - 15.4 % 12.8    MCV 79.0 - 97.0 fL 90.9    MCH 26.6 - 33.0 pg 29.8    MCHC 31.5 - 35.7 g/dL 32.8    Neutrophil Rel % 42.7 - 76.0 % 45.7    Lymphocyte Rel % 19.6 - 45.3 % 33.9    Monocyte Rel % 5.0 - 12.0 % 14.5 (H)    Eosinophil Rel % 0.3 - 6.2 % 5.1    Basophil Rel % 0.0 - 1.5 % 0.4    Immature Granulocyte Rel % 0.0 - 0.5 % 0.4    Neutrophils Absolute 1.70 - 7.00 10*3/mm3 2.07    Lymphocytes Absolute 0.70 - 3.10 10*3/mm3 1.54    Monocytes Absolute 0.10 - 0.90 10*3/mm3 0.66    Eosinophils Absolute 0.00 - 0.40 10*3/mm3 0.23    Basophils Absolute 0.00 - 0.20 10*3/mm3 0.02    Immature Grans, Absolute 0.00 - 0.05 10*3/mm3 0.02    nRBC 0.0 - 0.2 /100 WBC 0.0    Creatinine, Urine Not Estab. mg/dL  86.2   Microalbumin, Urine Not Estab. ug/mL  10.5   Microalbumin/Creatinine Ratio 0 - 29 mg/g creat  12   (H): Data is abnormally high  (L): Data is abnormally low        Assessment      Diagnoses and all orders for this visit:    1. Type 2 diabetes mellitus without complication, with long-term current use of insulin (Primary)  -     Continuous Glucose Sensor (Dexcom G7 Sensor) misc; Use 1 each Daily.  Dispense: 3 each;  Refill: 3    2. Primary hypertension    3. Mixed hyperlipidemia    Other orders  -     Discontinue: Tirzepatide 7.5 MG/0.5ML solution auto-injector; Inject 7.5 mg under the skin into the appropriate area as directed 1 (One) Time Per Week.  Dispense: 2 mL; Refill: 3  -     Discontinue: Tirzepatide 7.5 MG/0.5ML solution auto-injector; Inject 7.5 mg under the skin into the appropriate area as directed 1 (One) Time Per Week.  Dispense: 2 mL; Refill: 3  -     Discontinue: Tirzepatide 7.5 MG/0.5ML solution auto-injector; Inject 7.5 mg under the skin into the appropriate area as directed 1 (One) Time Per Week.  Dispense: 2 mL; Refill: 3             Assessment & Plan  1. Type 2 diabetes mellitus without complication.  His A1c level has shown a significant improvement, decreasing from 9 to 5.4, indicating effective management of his diabetes. He is currently on Lantus 15 units in the evening, NovoLog 7 units three times a day with meals, Monjaro 5 mg once a week, metformin 500 mg twice daily, and Jardiance 25 mg. He reports using the Dexcom G7 monitoring system, which has been helpful. The dosage of Monjaro will be increased from 5 mg to 7.5 mg once a week. If his fasting blood sugar levels remain below 100 for two consecutive days, the insulin dosage will be reduced by 1 unit across the board. If his fasting blood sugar levels increase above 126 during the reduction, the previous insulin dosage will be reinstated. A prescription refill for the Dexcom sensor has been provided.    2. Hypertension.  His blood pressure readings are within the normal range today. He will continue his current medication regimen, which includes lisinopril 10 mg.    3. Hyperlipidemia.  His cholesterol levels are well-managed at present. He will continue his current medication regimen, which includes atorvastatin 20 mg.    Follow-up  The patient will follow up in 3 months.    No orders of the defined types were placed in this encounter.      Follow  Up   Return in about 3 months (around 3/19/2025) for DMII w/ insulin reduction and med increase,.  Patient was given instructions and counseling regarding his condition or for health maintenance advice. Please see specific information pulled into the AVS if appropriate.         Patient or patient representative verbalized consent for the use of Ambient Listening during the visit with  Rian Gamble MD for chart documentation. 12/19/2024  11:21 CST

## 2024-12-23 ENCOUNTER — TELEPHONE (OUTPATIENT)
Dept: FAMILY MEDICINE CLINIC | Facility: CLINIC | Age: 64
End: 2024-12-23
Payer: COMMERCIAL

## 2024-12-23 DIAGNOSIS — E11.65 UNCONTROLLED TYPE 2 DIABETES MELLITUS WITH HYPERGLYCEMIA: ICD-10-CM

## 2024-12-23 DIAGNOSIS — I10 ESSENTIAL HYPERTENSION: Chronic | ICD-10-CM

## 2024-12-23 RX ORDER — LISINOPRIL 10 MG/1
10 TABLET ORAL DAILY
Qty: 90 TABLET | Refills: 3 | Status: SHIPPED | OUTPATIENT
Start: 2024-12-23

## 2024-12-23 RX ORDER — COLESEVELAM 180 1/1
1875 TABLET ORAL 2 TIMES DAILY WITH MEALS
Qty: 540 TABLET | Refills: 1 | Status: SHIPPED | OUTPATIENT
Start: 2024-12-23

## 2024-12-23 NOTE — TELEPHONE ENCOUNTER
Caller: Abdullahi MACE    Relationship: Self    Best call back number: [phone]     Requested Prescriptions:   Requested Prescriptions     Pending Prescriptions Disp Refills    empagliflozin (Jardiance) 25 MG tablet tablet 90 tablet 1     Sig: Take 1 tablet by mouth Daily.    lisinopril (PRINIVIL,ZESTRIL) 10 MG tablet 90 tablet 3     Sig: Take 1 tablet by mouth Daily.    colesevelam (WELCHOL) 625 MG tablet 540 tablet 1     Sig: Take 3 tablets by mouth 2 (Two) Times a Day With Meals.        Pharmacy where request should be sent: [facility] [address] AT [address]AL TO [facility] - [phone]  - [phone] FX     Last office visit with prescribing clinician: 12/19/2024   Last telemedicine visit with prescribing clinician: Visit date not found   Next office visit with prescribing clinician: 3/20/2025     Additional details provided by patient: PATIENT STATED HE HAS A WEEK LEFT OF THESE MEDICATIONS    Does the patient have less than a 3 day supply:  [] Yes  [x] No    Deidre Slaughter Rep   12/23/24 10:37 CST

## 2024-12-23 NOTE — TELEPHONE ENCOUNTER
Caller: Abdullahi MACE    Relationship: Self    Best call back number: [phone]     What is the best time to reach you: ANYTIME    Who are you requesting to speak with (clinical staff, provider,  specific staff member): PROVIDER OR CLINICAL    What was the call regarding: PATIENT STATED HIS PRESCRIPTION FOR Tirzepatide-Weight Management (Zepbound) 7.5 MG/0.5ML solution auto-injector WAS CANCELLED [facility] - [place] [place] - [address] AT Portal to [facility] Sites - [phone] [facility] [phone] FX BECAUSE THEY ARE NOT ABLE TO FILL IT.    PATIENT STATED HE IS NEEDING THIS TO BE SENT TO [id number] [place], [place] - [address] AT [address] - [phone]  - [phone] FX       PATIENT STATED SHOULD  THE PRESCRIPTION SHOULD BE FOR MOUNJARO 7.5 NOT  Tirzepatide-Weight Management (Zepbound) 7.5 MG/0.5ML solution auto-injector

## 2024-12-28 NOTE — TELEPHONE ENCOUNTER
"Note says Mounjaro. They are the same molecule except one is marketed for DMII and the other for weight loss but they are the same thing. I represcribed tirzepatide as \"mounjaro\" instead of \"zepbound\" see if that fixes it.   "

## 2025-02-17 DIAGNOSIS — E78.2 MIXED HYPERLIPIDEMIA: Chronic | ICD-10-CM

## 2025-02-17 RX ORDER — ATORVASTATIN CALCIUM 20 MG/1
20 TABLET, FILM COATED ORAL DAILY
Qty: 90 TABLET | Refills: 1 | Status: SHIPPED | OUTPATIENT
Start: 2025-02-17

## 2025-02-17 NOTE — TELEPHONE ENCOUNTER
Caller: Abdullahi MACE    Relationship: Self    Best call back number: [phone]     Requested Prescriptions:   Requested Prescriptions     Pending Prescriptions Disp Refills    atorvastatin (LIPITOR) 20 MG tablet 90 tablet 1     Sig: Take 1 tablet by mouth Daily.        Pharmacy where request should be sent: [facility] #[id number] - [address] AT [address] [facility] - [phone]  - [phone] FX     Last office visit with prescribing clinician: 12/19/2024   Last telemedicine visit with prescribing clinician: Visit date not found   Next office visit with prescribing clinician: 3/20/2025     Additional details provided by patient:     Does the patient have less than a 3 day supply:  [x] Yes  [] No    Would you like a call back once the refill request has been completed: [x] Yes [] No    If the office needs to give you a call back, can they leave a voicemail: [] Yes [] No    Deidre Morales Rep   02/17/25 10:41 CST

## 2025-03-20 ENCOUNTER — OFFICE VISIT (OUTPATIENT)
Dept: FAMILY MEDICINE CLINIC | Facility: CLINIC | Age: 65
End: 2025-03-20
Payer: COMMERCIAL

## 2025-03-20 VITALS
DIASTOLIC BLOOD PRESSURE: 72 MMHG | HEIGHT: 71 IN | HEART RATE: 80 BPM | SYSTOLIC BLOOD PRESSURE: 120 MMHG | WEIGHT: 205 LBS | BODY MASS INDEX: 28.7 KG/M2 | RESPIRATION RATE: 24 BRPM | OXYGEN SATURATION: 100 %

## 2025-03-20 DIAGNOSIS — Z79.4 TYPE 2 DIABETES MELLITUS WITH DIABETIC POLYNEUROPATHY, WITH LONG-TERM CURRENT USE OF INSULIN: Primary | ICD-10-CM

## 2025-03-20 DIAGNOSIS — E11.42 TYPE 2 DIABETES MELLITUS WITH DIABETIC POLYNEUROPATHY, WITH LONG-TERM CURRENT USE OF INSULIN: Primary | ICD-10-CM

## 2025-03-20 PROBLEM — Z01.89 LABORATORY TEST: Status: RESOLVED | Noted: 2017-08-25 | Resolved: 2025-03-20

## 2025-03-20 PROBLEM — L03.314 CELLULITIS OF GROIN: Status: RESOLVED | Noted: 2018-02-28 | Resolved: 2025-03-20

## 2025-03-20 LAB
EXPIRATION DATE: NORMAL
HBA1C MFR BLD: 5.6 % (ref 4.5–5.7)
Lab: NORMAL

## 2025-03-20 RX ORDER — INSULIN GLARGINE 100 [IU]/ML
14 INJECTION, SOLUTION SUBCUTANEOUS DAILY
COMMUNITY

## 2025-03-20 NOTE — PROGRESS NOTES
"Chief Complaint  Diabetes (Talk about med increase)    Subjective      Abdullahi MACE presents to [facility]  History of Present Illness  The patient is a 64-year-old male who is here today for follow-up on diabetes. He was last seen on 12/19/2024, approximately 3 months ago, where his A1c had shown a significant improvement, decreasing from 9 to 5.4. Today, his A1c is 5.6. He was previously on Lantus 15 units and NovoLog 7 units 3 times a day. He is also on Mounjaro 5 mg once a week, metformin 500 mg twice daily, and Jardiance 25 mg. The dosage of Mounjaro was increased from 5 mg to 7.5 mg, and he was instructed to reduce his insulin dose if his blood sugar levels are below 100, with the goal of tapering him off insulin.    He has been adhering to the prescribed regimen of NovoLog, administering 6 units with each meal, and Lantus, reduced from 15 to 14 units due to an increase in Mounjaro dosage to 7.5 mg. Occasionally, he further reduces his insulin intake, resulting in a few days where he only requires one injection. He utilizes Dexcom for glucose monitoring, which he finds beneficial. His fasting blood glucose levels have been consistently in the 90s. He reports no adverse effects from the increased Mounjaro dosage. He experiences hypoglycemic episodes, with blood glucose levels dropping to 70 or below, prompting him to consume food. He has expressed a preference for discontinuing insulin therapy.    He reports experiencing neuropathy in his legs and feet but is not currently on any medication for this condition.    MEDICATIONS  Lantus, NovoLog, Mounjaro, metformin, Jardiance      Objective   Vital Signs:  /72   Pulse 80   Resp 24   Ht 180.3 cm (70.98\")   Wt 93 kg (205 lb)   SpO2 100%   BMI 28.60 kg/m²   Estimated body mass index is 28.6 kg/m² as calculated from the following:    Height as of this encounter: 180.3 cm (70.98\").    Weight as of this encounter: 93 kg " (205 lb).            Physical Exam     Physical Exam        Result Review :  The following labs/imaging/notes were reviewed and discussed with the patient by Rian Gamble MD on 03/20/2025:            Assessment      Diagnoses and all orders for this visit:    1. Type 2 diabetes mellitus with diabetic polyneuropathy, with long-term current use of insulin (Primary)  -     POC Glycosylated Hemoglobin (Hb A1C)    Other orders  -     Tirzepatide 10 MG/0.5ML solution auto-injector; Inject 10 mg under the skin into the appropriate area as directed 1 (One) Time Per Week.  Dispense: 2 mL; Refill: 5             Assessment & Plan  1. Diabetes Mellitus.  His A1c level has shown significant improvement, currently at 5.6, indicating effective management of his diabetes. The goal is to gradually reduce his insulin dependency while maintaining optimal blood glucose control with Mounjaro, metformin, and Jardiance. He is advised to adjust his insulin dosage based on fasting blood glucose levels, reducing it by 1 unit every 3 days if the levels remain below 126. The dosage of Mounjaro will be increased to 10 mg once a week, and a prescription has been sent to [facility]. If he experiences any side effects from the increased Mounjaro dosage, he should inform us so that the dosage can be reverted to 7.5 mg.    2. Neuropathy.  He reports experiencing neuropathy in his legs and feet but is not currently on any medication for this condition.    Follow-up  The patient will follow up in 3 months.    Orders Placed This Encounter   Procedures    POC Glycosylated Hemoglobin (Hb A1C)     Release to patient:   Routine Release [[id number]]       Follow Up   Return in about 3 months (around 6/20/2025) for DMII w/ insulin reduction & mounjaro increase.  Patient was given instructions and counseling regarding his condition or for health maintenance advice. Please see specific information pulled into the AVS if appropriate.         Patient  or patient representative verbalized consent for the use of Ambient Listening during the visit with  Rian Gamble MD for chart documentation. 3/20/2025  10:03 CDT

## 2025-04-13 DIAGNOSIS — E11.9 TYPE 2 DIABETES MELLITUS WITHOUT COMPLICATION, WITH LONG-TERM CURRENT USE OF INSULIN: ICD-10-CM

## 2025-04-13 DIAGNOSIS — Z79.4 TYPE 2 DIABETES MELLITUS WITHOUT COMPLICATION, WITH LONG-TERM CURRENT USE OF INSULIN: ICD-10-CM

## 2025-04-14 RX ORDER — ACYCLOVIR 400 MG/1
TABLET ORAL DAILY
Qty: 3 EACH | Refills: 3 | Status: SHIPPED | OUTPATIENT
Start: 2025-04-14

## 2025-06-18 DIAGNOSIS — E11.65 UNCONTROLLED TYPE 2 DIABETES MELLITUS WITH HYPERGLYCEMIA: ICD-10-CM

## 2025-06-18 DIAGNOSIS — E11.9 CONTROLLED TYPE 2 DIABETES MELLITUS WITHOUT COMPLICATION, WITHOUT LONG-TERM CURRENT USE OF INSULIN: Chronic | ICD-10-CM

## 2025-06-18 RX ORDER — EMPAGLIFLOZIN 25 MG/1
25 TABLET, FILM COATED ORAL DAILY
Qty: 90 TABLET | Refills: 1 | Status: SHIPPED | OUTPATIENT
Start: 2025-06-18

## 2025-06-18 RX ORDER — COLESEVELAM 180 1/1
TABLET ORAL
Qty: 540 TABLET | Refills: 1 | Status: SHIPPED | OUTPATIENT
Start: 2025-06-18

## 2025-06-19 ENCOUNTER — OFFICE VISIT (OUTPATIENT)
Dept: FAMILY MEDICINE CLINIC | Facility: CLINIC | Age: 65
End: 2025-06-19
Payer: COMMERCIAL

## 2025-06-19 VITALS
HEIGHT: 70 IN | SYSTOLIC BLOOD PRESSURE: 98 MMHG | OXYGEN SATURATION: 99 % | WEIGHT: 188 LBS | BODY MASS INDEX: 26.92 KG/M2 | HEART RATE: 99 BPM | DIASTOLIC BLOOD PRESSURE: 60 MMHG

## 2025-06-19 DIAGNOSIS — I10 PRIMARY HYPERTENSION: Chronic | ICD-10-CM

## 2025-06-19 DIAGNOSIS — E11.42 TYPE 2 DIABETES MELLITUS WITH DIABETIC POLYNEUROPATHY, WITH LONG-TERM CURRENT USE OF INSULIN: ICD-10-CM

## 2025-06-19 DIAGNOSIS — E11.9 CONTROLLED TYPE 2 DIABETES MELLITUS WITHOUT COMPLICATION, WITHOUT LONG-TERM CURRENT USE OF INSULIN: Primary | ICD-10-CM

## 2025-06-19 DIAGNOSIS — Z79.4 TYPE 2 DIABETES MELLITUS WITH DIABETIC POLYNEUROPATHY, WITH LONG-TERM CURRENT USE OF INSULIN: ICD-10-CM

## 2025-06-19 PROBLEM — Z87.19 H/O GASTROESOPHAGEAL REFLUX (GERD): Status: RESOLVED | Noted: 2022-09-20 | Resolved: 2025-06-19

## 2025-06-19 LAB
EXPIRATION DATE: NORMAL
HBA1C MFR BLD: 5.4 % (ref 4.5–5.7)
Lab: NORMAL

## 2025-06-19 NOTE — PROGRESS NOTES
Chief Complaint  Diabetes and Hypertension (Running low)    Subjective      Abdullahi MACE presents to [facility]  History of Present Illness  The patient presents for evaluation of diabetes, hypertension, and hyperlipidemia.    He has been on a regimen of Mounjaro, with the dosage gradually increasing. Approximately 2 months ago, he discontinued NovoLog due to hypoglycemic episodes, as he is currently using a continuous glucose monitor. His current medications include Lantus 14 units, Mounjaro 10 mg, metformin 1000 mg twice daily, and Jardiance 25 mg. Insulin is administered at night, and he uses Dexcom to monitor fasting blood glucose levels, which typically range from the 90s to slightly over 100. Since discontinuing NovoLog, fewer hypoglycemic episodes have occurred, although the low alarm, set at 70, occasionally triggers. He reports feeling well overall. Blood glucose levels have been stable, with occasional readings in the high 80s upon waking. He has attempted to manage his condition without Lantus for a few days, but this resulted in elevated blood glucose levels that persisted throughout the day, reaching over 180.    He is currently on lisinopril 10 mg for blood pressure management. He does not monitor his blood pressure at home. Occasional dizziness upon standing, particularly after prolonged periods of sitting, is reported. This symptom was previously managed by adjusting his blood pressure medication, but it has recurred since starting lisinopril.    He is also on atorvastatin 20 mg for cholesterol management. WelChol is taken at a dosage of 3 tablets twice daily with meals, and no issues with this medication are reported. Additionally, he is on a daily low-dose aspirin regimen due to a family history of heart disease.    He had a CVA in .    FAMILY HISTORY  His father had heart disease and  at the age of 53.      Objective   Vital Signs:  BP 98/60   Pulse 99  "  Ht 177.8 cm (70\")   Wt 85.3 kg (188 lb)   SpO2 99%   BMI 26.98 kg/m²   Estimated body mass index is 26.98 kg/m² as calculated from the following:    Height as of this encounter: 177.8 cm (70\").    Weight as of this encounter: 85.3 kg (188 lb).            Physical Exam     Physical Exam        Result Review :  The following labs/imaging/notes were reviewed and discussed with the patient by Rian Gamble MD on 06/19/2025:   Latest Reference Range & Units 08/22/24 09:25 08/22/24 09:49 12/19/24 16:13 03/20/25 09:24 06/19/25 10:54   Sodium 136 - 145 mmol/L 139       Potassium 3.5 - 5.2 mmol/L 4.8       Chloride 98 - 107 mmol/L 98       CO2 22.0 - 29.0 mmol/L 24.9       BUN 8 - 23 mg/dL 19       Creatinine 0.76 - 1.27 mg/dL 1.29 (H)       BUN/Creatinine Ratio 7.0 - 25.0  14.7       EGFR Result >60.0 mL/min/1.73 62.7       Glucose 65 - 99 mg/dL 99       Calcium 8.6 - 10.5 mg/dL 9.7       Alkaline Phosphatase 39 - 117 U/L 94       Total Protein 6.0 - 8.5 g/dL 6.9       Albumin 3.5 - 5.2 g/dL 4.1       A/G Ratio g/dL 1.5       AST (SGOT) 1 - 40 U/L 18       ALT (SGPT) 1 - 41 U/L 18       Total Bilirubin 0.0 - 1.2 mg/dL 0.4       Hemoglobin A1C 4.5 - 5.7 % 9.50 (H)  5.4 5.6 5.4   TSH Baseline 0.270 - 4.200 uIU/mL 3.810       Vitamin B-12 211 - 946 pg/mL 424       Folate 4.78 - 24.20 ng/mL >20.00       Total Cholesterol 0 - 200 mg/dL 105       HDL Cholesterol 40 - 60 mg/dL 35 (L)       LDL Cholesterol  0 - 100 mg/dL 53       Triglycerides 0 - 150 mg/dL 85       VLDL Cholesterol Solo 5 - 40 mg/dL 17       Globulin gm/dL 2.8       WBC 3.40 - 10.80 10*3/mm3 4.54       RBC 4.14 - 5.80 10*6/mm3 5.04       Hemoglobin 13.0 - 17.7 g/dL 15.0       Hematocrit 37.5 - 51.0 % 45.8       Platelets 140 - 450 10*3/mm3 155       RDW 12.3 - 15.4 % 12.8       MCV 79.0 - 97.0 fL 90.9       MCH 26.6 - 33.0 pg 29.8       MCHC 31.5 - 35.7 g/dL 32.8       Neutrophil Rel % 42.7 - 76.0 % 45.7       Lymphocyte Rel % 19.6 - 45.3 % 33.9       Monocyte " Rel % 5.0 - 12.0 % 14.5 (H)       Eosinophil Rel % 0.3 - 6.2 % 5.1       Basophil Rel % 0.0 - 1.5 % 0.4       Immature Granulocyte Rel % 0.0 - 0.5 % 0.4       Neutrophils Absolute 1.70 - 7.00 10*3/mm3 2.07       Lymphocytes Absolute 0.70 - 3.10 10*3/mm3 1.54       Monocytes Absolute 0.10 - 0.90 10*3/mm3 0.66       Eosinophils Absolute 0.00 - 0.40 10*3/mm3 0.23       Basophils Absolute 0.00 - 0.20 10*3/mm3 0.02       Immature Grans, Absolute 0.00 - 0.05 10*3/mm3 0.02       nRBC 0.0 - 0.2 /100 WBC 0.0       Creatinine, Urine Not Estab. mg/dL  86.2      Microalbumin, Urine Not Estab. ug/mL  10.5      Microalbumin/Creatinine Ratio 0 - 29 mg/g creat  12      (H): Data is abnormally high  (L): Data is abnormally low        Assessment      Diagnoses and all orders for this visit:    1. Controlled type 2 diabetes mellitus without complication, without long-term current use of insulin (Primary)  -     POC Glycosylated Hemoglobin (Hb A1C)    2. Type 2 diabetes mellitus with diabetic polyneuropathy, with long-term current use of insulin    3. Primary hypertension             Assessment & Plan  1. Diabetes mellitus.  - A1c level has improved significantly from 9.5 in 08/2024 to 5.4 currently.  - Weight loss has enhanced the effectiveness of insulin. Fasting blood sugar levels typically range from the 90s to slightly over 100.  - Current regimen includes Mounjaro 10 mg, metformin 1000 mg twice daily, and Jardiance 25 mg. NovoLog will be discontinued.  - Gradually reduce Lantus dosage by 2 units every 3 days if fasting blood sugar levels remain below 110 for 3 consecutive days. Further reduce by 1 unit if consistently below 100.    2. Hypertension.  - Blood pressure readings have been consistently low, with today's reading at 98/68.  - Weight loss of approximately 20 pounds, contributing to lower blood pressure.  - Reduce lisinopril dosage to half a tablet for the next few weeks and monitor blood pressure regularly.  - If blood  pressure remains low, lisinopril may be discontinued.    3. Hyperlipidemia.  - Continue current regimen of atorvastatin 20 mg and WelChol 3 tablets twice daily with meals.    Orders Placed This Encounter   Procedures    POC Glycosylated Hemoglobin (Hb A1C)     Release to patient:   Routine Release [[id number]]       Follow Up   No follow-ups on file.  Patient was given instructions and counseling regarding his condition or for health maintenance advice. Please see specific information pulled into the AVS if appropriate.         Patient or patient representative verbalized consent for the use of Ambient Listening during the visit with  Rian Gamble MD for chart documentation. 6/19/2025  11:14 CDT

## 2025-08-18 DIAGNOSIS — E78.2 MIXED HYPERLIPIDEMIA: Chronic | ICD-10-CM

## 2025-08-18 RX ORDER — ATORVASTATIN CALCIUM 20 MG/1
20 TABLET, FILM COATED ORAL DAILY
Qty: 90 TABLET | Refills: 1 | Status: SHIPPED | OUTPATIENT
Start: 2025-08-18

## (undated) DEVICE — YANKAUER,BULB TIP WITH VENT: Brand: ARGYLE

## (undated) DEVICE — SENSR O2 OXIMAX FNGR A/ 18IN NONSTR

## (undated) DEVICE — CUFF,BP,DISP,1 TUBE,ADULT,HP: Brand: MEDLINE

## (undated) DEVICE — MASK,OXYGEN,MED CONC,ADLT,7' TUB, UC: Brand: PENDING

## (undated) DEVICE — Device: Brand: DEFENDO AIR/WATER/SUCTION AND BIOPSY VALVE

## (undated) DEVICE — TBG SMPL FLTR LINE NASL 02/C02 A/ BX/100

## (undated) DEVICE — THE CHANNEL CLEANING BRUSH IS A NYLON FLEXI BRUSH ATTACHED TO A FLEXIBLE PLASTIC SHEATH DESIGNED TO SAFELY REMOVE DEBRIS FROM FLEXIBLE ENDOSCOPES.

## (undated) DEVICE — CONMED SCOPE SAVER BITE BLOCK, 20X27 MM: Brand: SCOPE SAVER

## (undated) DEVICE — FRCP BIOP ENDO CAPTURA/PRO SERR SPK 2.8MM 230CM

## (undated) DEVICE — FRCP BIOP ENDO CAPTURAPRO SPK SERR 2.8MM 230CM